# Patient Record
Sex: MALE | Race: WHITE | NOT HISPANIC OR LATINO | Employment: FULL TIME | URBAN - NONMETROPOLITAN AREA
[De-identification: names, ages, dates, MRNs, and addresses within clinical notes are randomized per-mention and may not be internally consistent; named-entity substitution may affect disease eponyms.]

---

## 2018-08-30 PROBLEM — I38 ENDOCARDITIS: Status: ACTIVE | Noted: 2018-08-30

## 2018-08-30 PROBLEM — R50.9 FEVER: Status: ACTIVE | Noted: 2018-08-30

## 2018-08-30 PROBLEM — Z95.2 MECHANICAL HEART VALVE PRESENT: Status: ACTIVE | Noted: 2018-08-30

## 2018-08-31 PROBLEM — A41.9 SEVERE SEPSIS: Status: ACTIVE | Noted: 2018-08-30

## 2018-08-31 PROBLEM — R78.81 BACTEREMIA: Status: ACTIVE | Noted: 2018-08-30

## 2018-08-31 PROBLEM — R65.20 SEVERE SEPSIS: Status: ACTIVE | Noted: 2018-08-30

## 2018-09-02 PROBLEM — B95.1 BACTEREMIA DUE TO GROUP B STREPTOCOCCUS: Status: ACTIVE | Noted: 2018-08-30

## 2018-09-07 PROBLEM — Z78.9 KNOWLEDGE DEFICIT ABOUT THERAPEUTIC DIET: Status: ACTIVE | Noted: 2018-09-07

## 2018-09-07 PROBLEM — Z55.8 KNOWLEDGE DEFICIT ABOUT THERAPEUTIC DIET: Status: ACTIVE | Noted: 2018-09-07

## 2023-11-12 ENCOUNTER — HOSPITAL ENCOUNTER (INPATIENT)
Facility: HOSPITAL | Age: 63
LOS: 4 days | Discharge: ANOTHER HOSPITAL | DRG: 871 | End: 2023-11-16
Attending: EMERGENCY MEDICINE | Admitting: STUDENT IN AN ORGANIZED HEALTH CARE EDUCATION/TRAINING PROGRAM
Payer: COMMERCIAL

## 2023-11-12 DIAGNOSIS — R50.9 FEVER: ICD-10-CM

## 2023-11-12 DIAGNOSIS — R78.81 BACTEREMIA DUE TO GRAM-POSITIVE BACTERIA: ICD-10-CM

## 2023-11-12 DIAGNOSIS — R78.81 BACTEREMIA: ICD-10-CM

## 2023-11-12 DIAGNOSIS — R07.9 CHEST PAIN AT REST: ICD-10-CM

## 2023-11-12 DIAGNOSIS — A41.9 SEVERE SEPSIS: Primary | ICD-10-CM

## 2023-11-12 DIAGNOSIS — B95.1 BACTEREMIA DUE TO GROUP B STREPTOCOCCUS: ICD-10-CM

## 2023-11-12 DIAGNOSIS — R65.20 SEVERE SEPSIS: Primary | ICD-10-CM

## 2023-11-12 DIAGNOSIS — R78.81 BACTEREMIA DUE TO GROUP B STREPTOCOCCUS: ICD-10-CM

## 2023-11-12 LAB
ACINETOBACTER CALCOACETICUS/BAUMANNII COMPLEX: NOT DETECTED
ALBUMIN SERPL BCP-MCNC: 3.4 G/DL (ref 3.5–5.2)
ALP SERPL-CCNC: 51 U/L (ref 55–135)
ALT SERPL W/O P-5'-P-CCNC: 47 U/L (ref 10–44)
ANION GAP SERPL CALC-SCNC: 10 MMOL/L (ref 3–11)
APTT PPP: 35.6 SEC (ref 21–32)
AST SERPL-CCNC: 93 U/L (ref 10–40)
BACTERIA #/AREA URNS HPF: NEGATIVE /HPF
BACTEROIDES FRAGILIS: NOT DETECTED
BASOPHILS NFR BLD: 0 % (ref 0–1.9)
BILIRUB SERPL-MCNC: 3 MG/DL (ref 0.1–1)
BILIRUB UR QL STRIP: ABNORMAL
BUN SERPL-MCNC: 30 MG/DL (ref 8–23)
CALCIUM SERPL-MCNC: 8.3 MG/DL (ref 8.7–10.5)
CANDIDA ALBICANS: NOT DETECTED
CANDIDA AURIS: NOT DETECTED
CANDIDA GLABRATA: NOT DETECTED
CANDIDA KRUSEI: NOT DETECTED
CANDIDA PARAPSILOSIS: NOT DETECTED
CANDIDA TROPICALIS: NOT DETECTED
CHLORIDE SERPL-SCNC: 110 MMOL/L (ref 95–110)
CLARITY UR: ABNORMAL
CO2 SERPL-SCNC: 19 MMOL/L (ref 23–29)
COLOR UR: ABNORMAL
CREAT SERPL-MCNC: 2 MG/DL (ref 0.5–1.4)
CRYPTOCOCCUS NEOFORMANS/GATTII: NOT DETECTED
CTP QC/QA: YES
CTX-M GENE: ABNORMAL
DIFFERENTIAL METHOD: ABNORMAL
DOHLE BOD BLD QL SMEAR: PRESENT
ENTEROBACTER CLOACAE COMPLEX: NOT DETECTED
ENTEROBACTERALES: NOT DETECTED
ENTEROCOCCUS FAECALIS: NOT DETECTED
ENTEROCOCCUS FAECIUM: NOT DETECTED
EOSINOPHIL NFR BLD: 0 % (ref 0–8)
ERYTHROCYTE [DISTWIDTH] IN BLOOD BY AUTOMATED COUNT: 14.1 % (ref 11.5–14.5)
ESCHERICHIA COLI: NOT DETECTED
EST. GFR  (NO RACE VARIABLE): 37.3 ML/MIN/1.73 M^2
FIO2: 28 %
GLUCOSE SERPL-MCNC: 179 MG/DL (ref 70–110)
GLUCOSE UR QL STRIP: NEGATIVE
GRAN CASTS #/AREA URNS LPF: 1 /LPF
HAEMOPHILUS INFLUENZAE: NOT DETECTED
HCT VFR BLD AUTO: 40.1 % (ref 40–54)
HGB BLD-MCNC: 14.4 G/DL (ref 14–18)
HGB UR QL STRIP: ABNORMAL
HYALINE CASTS #/AREA URNS LPF: 0 /LPF
IMM GRANULOCYTES # BLD AUTO: ABNORMAL K/UL (ref 0–0.04)
IMM GRANULOCYTES NFR BLD AUTO: ABNORMAL % (ref 0–0.5)
IMP GENE: ABNORMAL
INFLUENZA A, MOLECULAR: NEGATIVE
INFLUENZA B, MOLECULAR: NEGATIVE
INR PPP: 3 (ref 0.8–1.2)
KETONES UR QL STRIP: NEGATIVE
KLEBSIELLA AEROGENES: NOT DETECTED
KLEBSIELLA OXYTOCA: NOT DETECTED
KLEBSIELLA PNEUMONIAE GROUP: NOT DETECTED
KPC: ABNORMAL
LACTATE SERPL-SCNC: 5.5 MMOL/L (ref 0.5–2.2)
LACTATE SERPL-SCNC: 6.7 MMOL/L (ref 0.5–2.2)
LACTATE SERPL-SCNC: 8.3 MMOL/L (ref 0.5–2.2)
LEUKOCYTE ESTERASE UR QL STRIP: ABNORMAL
LISTERIA MONOCYTOGENES: NOT DETECTED
LPM: 2
LYMPHOCYTES NFR BLD: 3 % (ref 18–48)
MCH RBC QN AUTO: 34.5 PG (ref 27–31)
MCHC RBC AUTO-ENTMCNC: 35.9 G/DL (ref 32–36)
MCR-1: ABNORMAL
MCV RBC AUTO: 96 FL (ref 82–98)
MEC A/C AND MREJ (MRSA): ABNORMAL
MEC A/C: ABNORMAL
METAMYELOCYTES NFR BLD MANUAL: 2 %
MICROSCOPIC COMMENT: ABNORMAL
MODIFIED ALLEN'S TEST: ABNORMAL
MONOCYTES NFR BLD: 1 % (ref 4–15)
NDM GENE: ABNORMAL
NEISSERIA MENINGITIDIS: NOT DETECTED
NEUTROPHILS NFR BLD: 88 % (ref 38–73)
NEUTS BAND NFR BLD MANUAL: 6 %
NITRITE UR QL STRIP: POSITIVE
NRBC BLD-RTO: 0 /100 WBC
NT-PROBNP SERPL-MCNC: 4573 PG/ML (ref 5–900)
O2DEVICE: ABNORMAL
OXA-48-LIKE: ABNORMAL
PCO2 BLDA: 29.7 MMHG (ref 35–45)
PH SMN: 7.33 [PH] (ref 7.34–7.45)
PH UR STRIP: 6 [PH] (ref 5–8)
PLATELET # BLD AUTO: 119 K/UL (ref 150–450)
PMV BLD AUTO: 9.9 FL (ref 9.2–12.9)
PO2 BLDA: 61.6 MMHG (ref 80–100)
POC BASE DEFICIT: -10.4 MMOL/L (ref -2–2)
POC HCO3: 17.2 MMOL/L (ref 24–28)
POC PERFORMED BY: ABNORMAL
POC SATURATED O2: 93.7 % (ref 95–100)
POC TEMPERATURE: 37 C
POLYCHROMASIA BLD QL SMEAR: ABNORMAL
POTASSIUM SERPL-SCNC: 3.9 MMOL/L (ref 3.5–5.1)
PROCALCITONIN SERPL IA-MCNC: 32.56 NG/ML
PROT SERPL-MCNC: 7.3 G/DL (ref 6–8.4)
PROT UR QL STRIP: ABNORMAL
PROTEUS SPECIES: NOT DETECTED
PROTHROMBIN TIME: 27.1 SEC (ref 9–12.5)
PSEUDOMONAS AERUGINOSA: NOT DETECTED
RBC # BLD AUTO: 4.17 M/UL (ref 4.6–6.2)
RBC #/AREA URNS HPF: 4 /HPF (ref 0–4)
RSV AG SPEC QL IA: NEGATIVE
SALMONELLA SP: NOT DETECTED
SARS-COV-2 RDRP RESP QL NAA+PROBE: NEGATIVE
SERRATIA MARCESCENS: NOT DETECTED
SODIUM SERPL-SCNC: 139 MMOL/L (ref 136–145)
SP GR UR STRIP: >=1.03 (ref 1–1.03)
SPECIMEN SOURCE: ABNORMAL
SPECIMEN SOURCE: NORMAL
SPECIMEN SOURCE: NORMAL
SQUAMOUS #/AREA URNS HPF: 4 /HPF
STAPHYLOCOCCUS AUREUS: NOT DETECTED
STAPHYLOCOCCUS EPIDERMIDIS: NOT DETECTED
STAPHYLOCOCCUS LUGDUNESIS: NOT DETECTED
STAPHYLOCOCCUS SPECIES: NOT DETECTED
STENOTROPHOMONAS MALTOPHILIA: NOT DETECTED
STREPTOCOCCUS AGALACTIAE: NOT DETECTED
STREPTOCOCCUS PNEUMONIAE: NOT DETECTED
STREPTOCOCCUS PYOGENES: NOT DETECTED
STREPTOCOCCUS SPECIES: DETECTED
URN SPEC COLLECT METH UR: ABNORMAL
UROBILINOGEN UR STRIP-ACNC: 1 EU/DL
VAN A/B: ABNORMAL
VIM GENE: ABNORMAL
WBC # BLD AUTO: 14.37 K/UL (ref 3.9–12.7)
WBC #/AREA URNS HPF: 10 /HPF (ref 0–5)
WBC TOXIC VACUOLES BLD QL SMEAR: PRESENT

## 2023-11-12 PROCEDURE — 87154 CUL TYP ID BLD PTHGN 6+ TRGT: CPT | Performed by: EMERGENCY MEDICINE

## 2023-11-12 PROCEDURE — 63600175 PHARM REV CODE 636 W HCPCS: Performed by: EMERGENCY MEDICINE

## 2023-11-12 PROCEDURE — 81000 URINALYSIS NONAUTO W/SCOPE: CPT | Performed by: EMERGENCY MEDICINE

## 2023-11-12 PROCEDURE — 36415 COLL VENOUS BLD VENIPUNCTURE: CPT | Performed by: INTERNAL MEDICINE

## 2023-11-12 PROCEDURE — 83605 ASSAY OF LACTIC ACID: CPT | Mod: 91 | Performed by: EMERGENCY MEDICINE

## 2023-11-12 PROCEDURE — 25000003 PHARM REV CODE 250: Performed by: EMERGENCY MEDICINE

## 2023-11-12 PROCEDURE — 85730 THROMBOPLASTIN TIME PARTIAL: CPT | Performed by: EMERGENCY MEDICINE

## 2023-11-12 PROCEDURE — 93010 ELECTROCARDIOGRAM REPORT: CPT | Mod: ,,, | Performed by: INTERNAL MEDICINE

## 2023-11-12 PROCEDURE — 87147 CULTURE TYPE IMMUNOLOGIC: CPT | Performed by: EMERGENCY MEDICINE

## 2023-11-12 PROCEDURE — 93010 EKG 12-LEAD: ICD-10-PCS | Mod: ,,, | Performed by: INTERNAL MEDICINE

## 2023-11-12 PROCEDURE — 85610 PROTHROMBIN TIME: CPT | Performed by: EMERGENCY MEDICINE

## 2023-11-12 PROCEDURE — 27000221 HC OXYGEN, UP TO 24 HOURS

## 2023-11-12 PROCEDURE — 36600 WITHDRAWAL OF ARTERIAL BLOOD: CPT

## 2023-11-12 PROCEDURE — 63600175 PHARM REV CODE 636 W HCPCS: Performed by: INTERNAL MEDICINE

## 2023-11-12 PROCEDURE — 84145 PROCALCITONIN (PCT): CPT | Performed by: EMERGENCY MEDICINE

## 2023-11-12 PROCEDURE — 99900035 HC TECH TIME PER 15 MIN (STAT)

## 2023-11-12 PROCEDURE — 87634 RSV DNA/RNA AMP PROBE: CPT | Performed by: EMERGENCY MEDICINE

## 2023-11-12 PROCEDURE — 83880 ASSAY OF NATRIURETIC PEPTIDE: CPT | Performed by: EMERGENCY MEDICINE

## 2023-11-12 PROCEDURE — 93005 ELECTROCARDIOGRAM TRACING: CPT

## 2023-11-12 PROCEDURE — 87186 SC STD MICRODIL/AGAR DIL: CPT | Performed by: EMERGENCY MEDICINE

## 2023-11-12 PROCEDURE — 99900031 HC PATIENT EDUCATION (STAT)

## 2023-11-12 PROCEDURE — 94660 CPAP INITIATION&MGMT: CPT

## 2023-11-12 PROCEDURE — 99285 EMERGENCY DEPT VISIT HI MDM: CPT | Mod: 25

## 2023-11-12 PROCEDURE — 85027 COMPLETE CBC AUTOMATED: CPT | Performed by: EMERGENCY MEDICINE

## 2023-11-12 PROCEDURE — 87502 INFLUENZA DNA AMP PROBE: CPT | Performed by: EMERGENCY MEDICINE

## 2023-11-12 PROCEDURE — 96361 HYDRATE IV INFUSION ADD-ON: CPT

## 2023-11-12 PROCEDURE — 82803 BLOOD GASES ANY COMBINATION: CPT

## 2023-11-12 PROCEDURE — 27000190 HC CPAP FULL FACE MASK W/VALVE

## 2023-11-12 PROCEDURE — 85007 BL SMEAR W/DIFF WBC COUNT: CPT | Performed by: EMERGENCY MEDICINE

## 2023-11-12 PROCEDURE — 94761 N-INVAS EAR/PLS OXIMETRY MLT: CPT

## 2023-11-12 PROCEDURE — 87635 SARS-COV-2 COVID-19 AMP PRB: CPT | Performed by: EMERGENCY MEDICINE

## 2023-11-12 PROCEDURE — 96365 THER/PROPH/DIAG IV INF INIT: CPT

## 2023-11-12 PROCEDURE — 25000003 PHARM REV CODE 250: Performed by: INTERNAL MEDICINE

## 2023-11-12 PROCEDURE — 80053 COMPREHEN METABOLIC PANEL: CPT | Performed by: EMERGENCY MEDICINE

## 2023-11-12 PROCEDURE — 87040 BLOOD CULTURE FOR BACTERIA: CPT | Performed by: EMERGENCY MEDICINE

## 2023-11-12 PROCEDURE — 21400001 HC TELEMETRY ROOM

## 2023-11-12 PROCEDURE — 36415 COLL VENOUS BLD VENIPUNCTURE: CPT | Performed by: EMERGENCY MEDICINE

## 2023-11-12 PROCEDURE — 83605 ASSAY OF LACTIC ACID: CPT | Mod: 91 | Performed by: INTERNAL MEDICINE

## 2023-11-12 RX ORDER — SODIUM CHLORIDE 0.9 % (FLUSH) 0.9 %
10 SYRINGE (ML) INJECTION
Status: DISCONTINUED | OUTPATIENT
Start: 2023-11-12 | End: 2023-11-16 | Stop reason: HOSPADM

## 2023-11-12 RX ORDER — ALPRAZOLAM 0.25 MG/1
0.25 TABLET ORAL 2 TIMES DAILY PRN
Status: DISCONTINUED | OUTPATIENT
Start: 2023-11-12 | End: 2023-11-14

## 2023-11-12 RX ORDER — WARFARIN 1 MG/1
4 TABLET ORAL
Status: DISCONTINUED | OUTPATIENT
Start: 2023-11-14 | End: 2023-11-12

## 2023-11-12 RX ORDER — MORPHINE SULFATE 2 MG/ML
1 INJECTION, SOLUTION INTRAMUSCULAR; INTRAVENOUS EVERY 4 HOURS PRN
Status: DISCONTINUED | OUTPATIENT
Start: 2023-11-12 | End: 2023-11-16 | Stop reason: HOSPADM

## 2023-11-12 RX ORDER — TADALAFIL 5 MG/1
5 TABLET ORAL NIGHTLY
COMMUNITY
Start: 2023-10-23 | End: 2023-11-16

## 2023-11-12 RX ORDER — VANCOMYCIN 2 GRAM/500 ML IN 0.9 % SODIUM CHLORIDE INTRAVENOUS
2000 ONCE
Status: COMPLETED | OUTPATIENT
Start: 2023-11-12 | End: 2023-11-12

## 2023-11-12 RX ORDER — FAMOTIDINE 10 MG/ML
20 INJECTION INTRAVENOUS EVERY 12 HOURS
Status: DISCONTINUED | OUTPATIENT
Start: 2023-11-12 | End: 2023-11-15

## 2023-11-12 RX ORDER — ACETAMINOPHEN 500 MG
1000 TABLET ORAL
Status: COMPLETED | OUTPATIENT
Start: 2023-11-12 | End: 2023-11-12

## 2023-11-12 RX ORDER — ASPIRIN 81 MG/1
81 TABLET ORAL DAILY
Status: DISCONTINUED | OUTPATIENT
Start: 2023-11-12 | End: 2023-11-12

## 2023-11-12 RX ORDER — ONDANSETRON 2 MG/ML
4 INJECTION INTRAMUSCULAR; INTRAVENOUS EVERY 8 HOURS PRN
Status: DISCONTINUED | OUTPATIENT
Start: 2023-11-12 | End: 2023-11-16 | Stop reason: HOSPADM

## 2023-11-12 RX ORDER — ASPIRIN 81 MG/1
81 TABLET ORAL DAILY
Status: DISCONTINUED | OUTPATIENT
Start: 2023-11-13 | End: 2023-11-16 | Stop reason: HOSPADM

## 2023-11-12 RX ORDER — IBUPROFEN 800 MG/1
800 TABLET ORAL
Status: COMPLETED | OUTPATIENT
Start: 2023-11-12 | End: 2023-11-12

## 2023-11-12 RX ORDER — ACETAMINOPHEN 325 MG/1
650 TABLET ORAL EVERY 8 HOURS PRN
Status: DISCONTINUED | OUTPATIENT
Start: 2023-11-12 | End: 2023-11-15

## 2023-11-12 RX ORDER — SODIUM CHLORIDE 9 MG/ML
1000 INJECTION, SOLUTION INTRAVENOUS CONTINUOUS
Status: ACTIVE | OUTPATIENT
Start: 2023-11-12 | End: 2023-11-12

## 2023-11-12 RX ADMIN — MORPHINE SULFATE 1 MG: 2 INJECTION, SOLUTION INTRAMUSCULAR; INTRAVENOUS at 05:11

## 2023-11-12 RX ADMIN — SODIUM CHLORIDE 1000 ML: 9 INJECTION, SOLUTION INTRAVENOUS at 11:11

## 2023-11-12 RX ADMIN — PIPERACILLIN SODIUM AND TAZOBACTAM SODIUM 4.5 G: 4; .5 INJECTION, POWDER, FOR SOLUTION INTRAVENOUS at 08:11

## 2023-11-12 RX ADMIN — SODIUM CHLORIDE 250 ML: 9 INJECTION, SOLUTION INTRAVENOUS at 08:11

## 2023-11-12 RX ADMIN — ALPRAZOLAM 0.25 MG: 0.25 TABLET ORAL at 02:11

## 2023-11-12 RX ADMIN — SODIUM CHLORIDE 250 ML: 9 INJECTION, SOLUTION INTRAVENOUS at 10:11

## 2023-11-12 RX ADMIN — Medication 2000 MG: at 10:11

## 2023-11-12 RX ADMIN — FAMOTIDINE 20 MG: 10 INJECTION, SOLUTION INTRAVENOUS at 08:11

## 2023-11-12 RX ADMIN — ACETAMINOPHEN 1000 MG: 500 TABLET ORAL at 08:11

## 2023-11-12 RX ADMIN — IBUPROFEN 800 MG: 800 TABLET, FILM COATED ORAL at 08:11

## 2023-11-12 RX ADMIN — SODIUM CHLORIDE 2466 ML: 9 INJECTION, SOLUTION INTRAVENOUS at 08:11

## 2023-11-12 RX ADMIN — PIPERACILLIN SODIUM AND TAZOBACTAM SODIUM 4.5 G: 4; .5 INJECTION, POWDER, FOR SOLUTION INTRAVENOUS at 04:11

## 2023-11-12 NOTE — PROGRESS NOTES
"Pharmacokinetic Initial Assessment: IV Vancomycin    Assessment/Plan:    Initiate intravenous vancomycin with loading dose of 2000 mg once with subsequent doses when random concentrations are less than 20 mcg/mL  Desired empiric serum trough concentration is 15 to 20 mcg/mL  Draw vancomycin random level on 11/13 at 0430 with AM labs.  Pharmacy will continue to follow and monitor vancomycin.      Please contact pharmacy at extension [phone] with any questions regarding this assessment.     Thank you for the consult,   Haylee Avila       Patient brief summary:  Chapo Rosario is a 63 y.o. male initiated on antimicrobial therapy with IV Vancomycin for treatment of suspected sepsis    Drug Allergies:   Review of patient's allergies indicates:  No Known Allergies    Actual Body Weight:   137.6 kg    Renal Function:   Estimated Creatinine Clearance: 57.3 mL/min (A) (based on SCr of 2 mg/dL (H)).,     Dialysis Method (if applicable):  N/A.    CBC (last 72 hours):  Recent Labs   Lab Result Units 11/12/23  0837   WBC K/uL 14.37*   Hemoglobin g/dL 14.4   Hematocrit % 40.1   Platelets K/uL 119*   Gran % % 88.0*   Lymph % % 3.0*   Mono % % 1.0*   Eosinophil % % 0.0   Basophil % % 0.0   Differential Method  Manual       Metabolic Panel (last 72 hours):  Recent Labs   Lab Result Units 11/12/23  0837 11/12/23  0848   Sodium mmol/L 139  --    Potassium mmol/L 3.9  --    Chloride mmol/L 110  --    CO2 mmol/L 19*  --    Glucose mg/dL 179*  --    Glucose, UA   --  Negative   BUN mg/dL 30*  --    Creatinine mg/dL 2.0*  --    Albumin g/dL 3.4*  --    Total Bilirubin mg/dL 3.0*  --    Alkaline Phosphatase U/L 51*  --    AST U/L 93*  --    ALT U/L 47*  --        Drug levels (last 3 results):  No results for input(s): "VANCOMYCINRA", "VANCORANDOM", "VANCOMYCINPE", "VANCOPEAK", "VANCOMYCINTR", "VANCOTROUGH" in the last 72 hours.    Microbiologic Results:  Microbiology Results (last 7 days)       Procedure Component Value Units " Date/Time    Influenza A & B by Molecular [[id number]] Collected: 11/12/23 0834    Order Status: Completed Specimen: Nasopharyngeal Swab Updated: 11/12/23 0929     Influenza A, Molecular Negative     Influenza B, Molecular Negative     Flu A & B Source Nasal Swab    Blood culture x two cultures. Draw prior to antibiotics. [[id number]] Collected: 11/12/23 0836    Order Status: Sent Specimen: Blood Updated: 11/12/23 0838    Blood culture x two cultures. Draw prior to antibiotics. [[id number]] Collected: 11/12/23 0837    Order Status: Sent Specimen: Blood Updated: 11/12/23 0838

## 2023-11-12 NOTE — ED PROVIDER NOTES
Encounter Date: 11/12/2023       History     Chief Complaint   Patient presents with    Altered Mental Status     AMS status onset this am and fever since 3 pm yesterday. Tylenol given approximately 1 hr PTA. Recent cellulitis.     63-year-old male with a history of hypertension, aortic aneurysm, mechanical heart valve on Coumadin presents the emergency department with fever since yesterday with altered mental status, given Tylenol this morning, unknown mg.  He presents tachypneic, oxygen sat 85% on room air, oral temperature of 102.5° F, tachycardic.  No known exposure to COVID.  He does answer some questions, but appears somewhat lethargic.  Denies chest pain.  Family states he is having some nausea vomiting and diarrhea as well.  History gathered from family members      Review of patient's allergies indicates:  No Known Allergies  Past Medical History:   Diagnosis Date    Aortic aneurysm     Hypertension      Past Surgical History:   Procedure Laterality Date    APPENDECTOMY      CARDIAC SURGERY  2013    CHOLECYSTECTOMY      NECK SURGERY       No family history on file.  Social History     Tobacco Use    Smoking status: Former     Review of Systems   Constitutional:  Positive for chills, fatigue and fever.   HENT: Negative.     Eyes: Negative.    Respiratory:  Positive for shortness of breath.    Cardiovascular: Negative.    Gastrointestinal: Negative.    Genitourinary: Negative.    Neurological:  Positive for weakness.   All other systems reviewed and are negative.      Physical Exam     Initial Vitals [11/12/23 0820]   BP Pulse Resp Temp SpO2   (!) 127/57 (!) 113 (!) 28 (!) 102.5 °F (39.2 °C) (!) 85 %      MAP       --         Physical Exam    Nursing note and vitals reviewed.  Constitutional: He appears well-developed and well-nourished. He is not diaphoretic. He appears distressed.   HENT:   Head: Normocephalic and atraumatic.   Mouth/Throat: Oropharynx is clear and moist.   Eyes: EOM are normal. Pupils are  equal, round, and reactive to light.   Neck: Neck supple.   Normal range of motion.  Cardiovascular:  Regular rhythm.           No murmur heard.  Tachycardic, mechanical valve audible   Pulmonary/Chest: He has rales.   Abdominal: Abdomen is soft. Bowel sounds are normal. He exhibits no distension. There is no abdominal tenderness. There is no rebound.   Musculoskeletal:         General: No edema. Normal range of motion.      Cervical back: Normal range of motion and neck supple.     Neurological: He is alert.   GCS 14   Skin: Skin is warm and dry.         ED Course   Critical Care    Date/Time: 11/12/2023 9:53 AM    Performed by: Dwight Modi MD  Authorized by: Dwight Modi MD  Direct patient critical care time: 15 minutes  Additional history critical care time: 10 minutes  Ordering / reviewing critical care time: 10 minutes  Documentation critical care time: 15 minutes  Consulting other physicians critical care time: 10 minutes  Consult with family critical care time: 10 minutes  Total critical care time (exclusive of procedural time) : 70 minutes  Critical care was necessary to treat or prevent imminent or life-threatening deterioration of the following conditions: sepsis.  Critical care was time spent personally by me on the following activities: review of old charts, re-evaluation of patient's condition, pulse oximetry, ordering and review of radiographic studies, ordering and review of laboratory studies, ordering and performing treatments and interventions, obtaining history from patient or surrogate, examination of patient, evaluation of patient's response to treatment, discussions with primary provider and development of treatment plan with patient or surrogate.        Labs Reviewed   CBC W/ AUTO DIFFERENTIAL - Abnormal; Notable for the following components:       Result Value    WBC 14.37 (*)     RBC 4.17 (*)     MCH 34.5 (*)     Platelets 119 (*)     Gran % 88.0 (*)     Lymph % 3.0 (*)     Mono  % 1.0 (*)     All other components within normal limits   COMPREHENSIVE METABOLIC PANEL - Abnormal; Notable for the following components:    CO2 19 (*)     Glucose 179 (*)     BUN 30 (*)     Creatinine 2.0 (*)     Calcium 8.3 (*)     Albumin 3.4 (*)     Total Bilirubin 3.0 (*)     Alkaline Phosphatase 51 (*)     AST 93 (*)     ALT 47 (*)     eGFR 37.3 (*)     All other components within normal limits   LACTIC ACID, PLASMA - Abnormal; Notable for the following components:    Lactate (Lactic Acid) 8.3 (*)     All other components within normal limits    Narrative:     Lactic Acid critical result(s) called and verbal readback obtained   from Luz Maria Cuellar RN by [facility] 11/12/2023 09:17   URINALYSIS, REFLEX TO URINE CULTURE - Abnormal; Notable for the following components:    Color, UA Orange (*)     Appearance, UA Cloudy (*)     Specific Gravity, UA >=1.030 (*)     Protein, UA 1+ (*)     Bilirubin (UA) 1+ (*)     Occult Blood UA Trace (*)     Nitrite, UA Positive (*)     Leukocytes, UA Trace (*)     All other components within normal limits    Narrative:     Preferred Collection Type->Urine, Clean Catch  Specimen Source->Urine   APTT - Abnormal; Notable for the following components:    aPTT 35.6 (*)     All other components within normal limits   PROTIME-INR - Abnormal; Notable for the following components:    Prothrombin Time 27.1 (*)     INR 3.0 (*)     All other components within normal limits   NT-PRO NATRIURETIC PEPTIDE - Abnormal; Notable for the following components:    NT-proBNP 4573 (*)     All other components within normal limits   URINALYSIS MICROSCOPIC - Abnormal; Notable for the following components:    WBC, UA 10 (*)     Granular Casts, UA 1 (*)     All other components within normal limits    Narrative:     Preferred Collection Type->Urine, Clean Catch  Specimen Source->Urine   INFLUENZA A & B BY MOLECULAR   CULTURE, BLOOD   CULTURE, BLOOD   RSV ANTIGEN DETECTION    Narrative:     Specimen  Source->Nasopharyngeal Swab   PROCALCITONIN   SARS-COV-2 RDRP GENE    Narrative:     This test utilizes isothermal nucleic acid amplification technology to detect the SARS-CoV-2 RdRp nucleic acid segment. The analytical sensitivity (limit of detection) is 500 copies/swab.     A POSITIVE result is indicative of the presence of SARS-CoV-2 RNA; clinical correlation with patient history and other diagnostic information is necessary to determine patient infection status.    A NEGATIVE result means that SARS-CoV-2 nucleic acids are not present above the limit of detection. A NEGATIVE result should be treated as presumptive. It does not rule out the possibility of COVID-19 and should not be the sole basis for treatment decisions. If COVID-19 is strongly suspected based on clinical and exposure history, re-testing using an alternate molecular assay should be considered.     This test is only for use under the Food and Drug Administration s Emergency Use Authorization (EUA).     Commercial kits are provided by [employer]. Performance characteristics of the EUA have been independently verified by [facility] Department of Pathology and Laboratory Medicine.   _________________________________________________________________   The authorized Fact Sheet for Healthcare Providers and the authorized Fact Sheet for Patients of the ID NOW COVID-19 are available on the FDA website:    https://www.fda.gov/media/416672/download      https://www.fda.gov/media/421121/download        EKG Readings: (Independently Interpreted)   Initial Reading: No STEMI. Rhythm: Sinus Tachycardia. Heart Rate: 115. Ectopy: No Ectopy. Conduction: Normal. ST Segments: Normal ST Segments. T Waves: Normal. Axis: Normal. Clinical Impression: Sinus Tachycardia       Imaging Results              X-Ray Chest AP Portable (In process)                      Medications   vancomycin - pharmacy to dose (has no administration in time range)   vancomycin  "2 g in 0.9% sodium chloride 500 mL IVPB (has no administration in time range)   sodium chloride 0.9% bolus 2,466 mL 2,466 mL (2,466 mLs Intravenous New Bag 11/12/23 0834)   ibuprofen tablet 800 mg (800 mg Oral Given 11/12/23 0833)   acetaminophen tablet 1,000 mg (1,000 mg Oral Given 11/12/23 0833)   piperacillin-tazobactam (ZOSYN) 4.5 g in dextrose 5 % in water (D5W) 100 mL IVPB (MB+) (0 g Intravenous Stopped 11/12/23 0910)     Medical Decision Making  Amount and/or Complexity of Data Reviewed  Labs: ordered. Decision-making details documented in ED Course.  Radiology: ordered.    Risk  OTC drugs.  Prescription drug management.               ED Course as of 11/12/23 0959   Sun Nov 12, 2023   0909 Anion Gap: 10 [SD]   0944 Discussed ICU versus med surge for this patient with the nursing supervisor.  She has laid eyes on this patient, states patient can go to telemetry floor close with the nurse's station, if he declines she will transfer him to the ICU [SD]   0953 Patient's mental status has improved since his fever has come down.  Answering questions more appropriately. [SD]      ED Course User Index  [SD] Dwight Modi MD    Sepsis Perfusion Assessment: "I attest a sepsis perfusion exam was performed within 6 hours of sepsis, severe sepsis, or septic shock presentation, following fluid resuscitation."          Medical Decision Making:   Initial Assessment:   Sepsis protocol initiated, will use ideal body weight for fluid bolus due to patient being morbidly obese, as well as having heart issues, mechanical heart valve, do not want to fluid overload him  Differential Diagnosis:   Sepsis, viral syndrome, pneumonia  Clinical Tests:   Sepsis Perfusion Assessment: ""I attest a sepsis perfusion exam was performed within 6 hours of sepsis, severe sepsis, or septic shock presentation, following fluid resuscitation.""  ED Management:  This patient does have evidence of infective focus  My overall impression is " sepsis.  Source: Respiratory  Antibiotics given- Antibiotics (72h ago, onward)    None      Latest lactate reviewed-  8.3  Organ dysfunction indicated by Acute kidney injury    Fluid challenge Ideal Body Weight- The patient's ideal body weight  will be used to calculate fluid bolus of 30 ml/kg for treatment of septic shock.    Patient is morbidly obese, also history of cardiac issues as well as mechanical heart valve, I do not want to fluid overload him  Post- resuscitation assessment Yes Perfusion exam was performed within 6 hours of septic shock presentation after bolus shows Adequate tissue perfusion assessed by non-invasive monitoring       Will Not start Pressors- Levophed for MAP of 65        Clinical Impression:   Final diagnoses:  [R50.9] Fever  [A41.9, R65.20] Severe sepsis (Primary)        ED Disposition Condition    Admit Stable                Dwight Modi MD  11/12/23 6800

## 2023-11-12 NOTE — ED NOTES
Sepsis post fluid bolus initiation tissue perfusion exam:  See current vital signs.  Cardiopulmonary:   Heart: Tachycardia.   Lungs: Diminished to auscultation bilaterally.  Capillary refill: < 3 seconds.  Peripheral pulses: radial 2+ bilaterally.  Skin: warm/dry/pink with good turgor.

## 2023-11-13 PROBLEM — D69.6 THROMBOCYTOPENIA: Status: ACTIVE | Noted: 2023-11-13

## 2023-11-13 PROBLEM — G93.41 ENCEPHALOPATHY, METABOLIC: Status: ACTIVE | Noted: 2023-11-13

## 2023-11-13 PROBLEM — N17.9 AKI (ACUTE KIDNEY INJURY): Status: ACTIVE | Noted: 2023-11-13

## 2023-11-13 PROBLEM — R79.89 ELEVATED BRAIN NATRIURETIC PEPTIDE (BNP) LEVEL: Status: ACTIVE | Noted: 2023-11-13

## 2023-11-13 PROBLEM — R50.9 FEVER: Status: ACTIVE | Noted: 2023-11-13

## 2023-11-13 PROBLEM — R78.81 BACTEREMIA: Status: ACTIVE | Noted: 2023-11-13

## 2023-11-13 LAB
ALBUMIN SERPL BCP-MCNC: 3.3 G/DL (ref 3.5–5.2)
ALP SERPL-CCNC: 34 U/L (ref 55–135)
ALT SERPL W/O P-5'-P-CCNC: 72 U/L (ref 10–44)
ANION GAP SERPL CALC-SCNC: 12 MMOL/L (ref 3–11)
AST SERPL-CCNC: 136 U/L (ref 10–40)
BASOPHILS NFR BLD: 0 % (ref 0–1.9)
BILIRUB SERPL-MCNC: 4.3 MG/DL (ref 0.1–1)
BUN SERPL-MCNC: 48 MG/DL (ref 8–23)
CALCIUM SERPL-MCNC: 7.9 MG/DL (ref 8.7–10.5)
CHLORIDE SERPL-SCNC: 113 MMOL/L (ref 95–110)
CO2 SERPL-SCNC: 17 MMOL/L (ref 23–29)
CREAT SERPL-MCNC: 2.1 MG/DL (ref 0.5–1.4)
DIFFERENTIAL METHOD: ABNORMAL
DOHLE BOD BLD QL SMEAR: PRESENT
EOSINOPHIL NFR BLD: 0 % (ref 0–8)
ERYTHROCYTE [DISTWIDTH] IN BLOOD BY AUTOMATED COUNT: 15 % (ref 11.5–14.5)
EST. GFR  (NO RACE VARIABLE): 35.2 ML/MIN/1.73 M^2
GLUCOSE SERPL-MCNC: 155 MG/DL (ref 70–110)
HCT VFR BLD AUTO: 39.2 % (ref 40–54)
HGB BLD-MCNC: 13.6 G/DL (ref 14–18)
IMM GRANULOCYTES # BLD AUTO: ABNORMAL K/UL (ref 0–0.04)
IMM GRANULOCYTES NFR BLD AUTO: ABNORMAL % (ref 0–0.5)
INR PPP: 2.8 (ref 0.8–1.2)
LACTATE SERPL-SCNC: 3.7 MMOL/L (ref 0.5–2.2)
LACTATE SERPL-SCNC: 4.6 MMOL/L (ref 0.5–2.2)
LACTATE SERPL-SCNC: 6 MMOL/L (ref 0.5–2.2)
LACTATE SERPL-SCNC: 6.9 MMOL/L (ref 0.5–2.2)
LYMPHOCYTES NFR BLD: 16 % (ref 18–48)
MCH RBC QN AUTO: 34.4 PG (ref 27–31)
MCHC RBC AUTO-ENTMCNC: 34.7 G/DL (ref 32–36)
MCV RBC AUTO: 99 FL (ref 82–98)
MONOCYTES NFR BLD: 7 % (ref 4–15)
NEUTROPHILS NFR BLD: 72 % (ref 38–73)
NEUTS BAND NFR BLD MANUAL: 5 %
NRBC BLD-RTO: 0 /100 WBC
PLATELET # BLD AUTO: 88 K/UL (ref 150–450)
PMV BLD AUTO: 11.3 FL (ref 9.2–12.9)
POLYCHROMASIA BLD QL SMEAR: ABNORMAL
POTASSIUM SERPL-SCNC: 4.2 MMOL/L (ref 3.5–5.1)
PROT SERPL-MCNC: 7.3 G/DL (ref 6–8.4)
PROTHROMBIN TIME: 25.4 SEC (ref 9–12.5)
RBC # BLD AUTO: 3.95 M/UL (ref 4.6–6.2)
SODIUM SERPL-SCNC: 142 MMOL/L (ref 136–145)
VANCOMYCIN SERPL-MCNC: 8.2 UG/ML
WBC # BLD AUTO: 13.21 K/UL (ref 3.9–12.7)
WBC TOXIC VACUOLES BLD QL SMEAR: PRESENT

## 2023-11-13 PROCEDURE — 21400001 HC TELEMETRY ROOM

## 2023-11-13 PROCEDURE — 80202 ASSAY OF VANCOMYCIN: CPT | Performed by: EMERGENCY MEDICINE

## 2023-11-13 PROCEDURE — 36415 COLL VENOUS BLD VENIPUNCTURE: CPT | Performed by: INTERNAL MEDICINE

## 2023-11-13 PROCEDURE — 85610 PROTHROMBIN TIME: CPT | Performed by: EMERGENCY MEDICINE

## 2023-11-13 PROCEDURE — 25000003 PHARM REV CODE 250: Performed by: STUDENT IN AN ORGANIZED HEALTH CARE EDUCATION/TRAINING PROGRAM

## 2023-11-13 PROCEDURE — 27000221 HC OXYGEN, UP TO 24 HOURS

## 2023-11-13 PROCEDURE — 36415 COLL VENOUS BLD VENIPUNCTURE: CPT | Performed by: EMERGENCY MEDICINE

## 2023-11-13 PROCEDURE — 94761 N-INVAS EAR/PLS OXIMETRY MLT: CPT

## 2023-11-13 PROCEDURE — 99900035 HC TECH TIME PER 15 MIN (STAT)

## 2023-11-13 PROCEDURE — 36415 COLL VENOUS BLD VENIPUNCTURE: CPT | Performed by: STUDENT IN AN ORGANIZED HEALTH CARE EDUCATION/TRAINING PROGRAM

## 2023-11-13 PROCEDURE — 99223 1ST HOSP IP/OBS HIGH 75: CPT | Mod: ,,, | Performed by: STUDENT IN AN ORGANIZED HEALTH CARE EDUCATION/TRAINING PROGRAM

## 2023-11-13 PROCEDURE — 94660 CPAP INITIATION&MGMT: CPT

## 2023-11-13 PROCEDURE — 85027 COMPLETE CBC AUTOMATED: CPT | Performed by: EMERGENCY MEDICINE

## 2023-11-13 PROCEDURE — 80053 COMPREHEN METABOLIC PANEL: CPT | Performed by: EMERGENCY MEDICINE

## 2023-11-13 PROCEDURE — 63600175 PHARM REV CODE 636 W HCPCS: Performed by: INTERNAL MEDICINE

## 2023-11-13 PROCEDURE — 99900031 HC PATIENT EDUCATION (STAT)

## 2023-11-13 PROCEDURE — 63600175 PHARM REV CODE 636 W HCPCS: Performed by: EMERGENCY MEDICINE

## 2023-11-13 PROCEDURE — 63600175 PHARM REV CODE 636 W HCPCS: Performed by: STUDENT IN AN ORGANIZED HEALTH CARE EDUCATION/TRAINING PROGRAM

## 2023-11-13 PROCEDURE — 83605 ASSAY OF LACTIC ACID: CPT | Performed by: INTERNAL MEDICINE

## 2023-11-13 PROCEDURE — 25000003 PHARM REV CODE 250: Performed by: EMERGENCY MEDICINE

## 2023-11-13 PROCEDURE — 94799 UNLISTED PULMONARY SVC/PX: CPT

## 2023-11-13 PROCEDURE — 25000003 PHARM REV CODE 250: Performed by: INTERNAL MEDICINE

## 2023-11-13 PROCEDURE — 85007 BL SMEAR W/DIFF WBC COUNT: CPT | Performed by: EMERGENCY MEDICINE

## 2023-11-13 PROCEDURE — 83605 ASSAY OF LACTIC ACID: CPT | Mod: 91 | Performed by: STUDENT IN AN ORGANIZED HEALTH CARE EDUCATION/TRAINING PROGRAM

## 2023-11-13 PROCEDURE — 99223 PR INITIAL HOSPITAL CARE,LEVL III: ICD-10-PCS | Mod: ,,, | Performed by: STUDENT IN AN ORGANIZED HEALTH CARE EDUCATION/TRAINING PROGRAM

## 2023-11-13 RX ORDER — VANCOMYCIN 2 GRAM/500 ML IN 0.9 % SODIUM CHLORIDE INTRAVENOUS
2000 ONCE
Status: COMPLETED | OUTPATIENT
Start: 2023-11-13 | End: 2023-11-13

## 2023-11-13 RX ORDER — FUROSEMIDE 10 MG/ML
40 INJECTION INTRAMUSCULAR; INTRAVENOUS ONCE
Status: COMPLETED | OUTPATIENT
Start: 2023-11-13 | End: 2023-11-13

## 2023-11-13 RX ORDER — METOPROLOL TARTRATE 1 MG/ML
5 INJECTION, SOLUTION INTRAVENOUS EVERY 5 MIN PRN
Status: COMPLETED | OUTPATIENT
Start: 2023-11-13 | End: 2023-11-14

## 2023-11-13 RX ORDER — SODIUM BICARBONATE 650 MG/1
650 TABLET ORAL 2 TIMES DAILY
Status: DISCONTINUED | OUTPATIENT
Start: 2023-11-13 | End: 2023-11-16

## 2023-11-13 RX ORDER — SODIUM CHLORIDE 9 MG/ML
INJECTION, SOLUTION INTRAVENOUS CONTINUOUS
Status: DISCONTINUED | OUTPATIENT
Start: 2023-11-13 | End: 2023-11-13

## 2023-11-13 RX ADMIN — ASPIRIN 81 MG: 81 TABLET, COATED ORAL at 09:11

## 2023-11-13 RX ADMIN — SODIUM BICARBONATE 650 MG TABLET 650 MG: at 08:11

## 2023-11-13 RX ADMIN — MORPHINE SULFATE 1 MG: 2 INJECTION, SOLUTION INTRAMUSCULAR; INTRAVENOUS at 01:11

## 2023-11-13 RX ADMIN — VANCOMYCIN HYDROCHLORIDE 500 MG: 500 INJECTION, POWDER, LYOPHILIZED, FOR SOLUTION INTRAVENOUS at 11:11

## 2023-11-13 RX ADMIN — FAMOTIDINE 20 MG: 10 INJECTION, SOLUTION INTRAVENOUS at 09:11

## 2023-11-13 RX ADMIN — SODIUM CHLORIDE: 9 INJECTION, SOLUTION INTRAVENOUS at 09:11

## 2023-11-13 RX ADMIN — SODIUM CHLORIDE: 9 INJECTION, SOLUTION INTRAVENOUS at 02:11

## 2023-11-13 RX ADMIN — ALPRAZOLAM 0.25 MG: 0.25 TABLET ORAL at 05:11

## 2023-11-13 RX ADMIN — MORPHINE SULFATE 1 MG: 2 INJECTION, SOLUTION INTRAMUSCULAR; INTRAVENOUS at 02:11

## 2023-11-13 RX ADMIN — ACETAMINOPHEN 650 MG: 325 TABLET ORAL at 12:11

## 2023-11-13 RX ADMIN — FAMOTIDINE 20 MG: 10 INJECTION, SOLUTION INTRAVENOUS at 08:11

## 2023-11-13 RX ADMIN — ONDANSETRON 4 MG: 2 INJECTION INTRAMUSCULAR; INTRAVENOUS at 01:11

## 2023-11-13 RX ADMIN — FUROSEMIDE 40 MG: 10 INJECTION, SOLUTION INTRAVENOUS at 05:11

## 2023-11-13 RX ADMIN — PIPERACILLIN SODIUM AND TAZOBACTAM SODIUM 4.5 G: 4; .5 INJECTION, POWDER, FOR SOLUTION INTRAVENOUS at 12:11

## 2023-11-13 RX ADMIN — PIPERACILLIN SODIUM AND TAZOBACTAM SODIUM 4.5 G: 4; .5 INJECTION, POWDER, FOR SOLUTION INTRAVENOUS at 10:11

## 2023-11-13 RX ADMIN — WARFARIN SODIUM 8 MG: 5 TABLET ORAL at 08:11

## 2023-11-13 RX ADMIN — SODIUM CHLORIDE 500 ML: 9 INJECTION, SOLUTION INTRAVENOUS at 08:11

## 2023-11-13 RX ADMIN — Medication 2000 MG: at 09:11

## 2023-11-13 RX ADMIN — SODIUM BICARBONATE 650 MG TABLET 650 MG: at 12:11

## 2023-11-13 RX ADMIN — SODIUM CHLORIDE 250 ML: 9 INJECTION, SOLUTION INTRAVENOUS at 02:11

## 2023-11-13 NOTE — PLAN OF CARE
[facility]  Initial [facility]charge Assessment       Primary Care Provider: Flynn Delarosa MD    Admission Diagnosis: Fever [R50.9]  Severe sepsis [A41.9, R65.20]    Admission Date: 11/12/2023  Expected Discharge Date:     Transition of Care Barriers: None    Payor: BLUE CROSS BLUE SHIELD / Plan: BCBS BLUE SAVER PPO - HD / Product Type: PPO /     Extended Emergency Contact Information  Primary Emergency Contact: ERLINDA VERDUGO  Mobile Phone: [phone]  Relation: Son  Preferred language: English   needed? No  Secondary Emergency Contact: ARTUR VERDUGO  Mobile Phone: [phone]  Relation: Relative  Preferred language: English   needed? No    Discharge Plan A: Home  Discharge Plan B: Home      [id number] - [place], [place] - [facility] [address] [facility] [address] [address]  Phone: [phone] Fax: [phone]      Initial Assessment (most recent)       Adult Discharge Assessment - 11/13/23 0758          Discharge Assessment    Assessment Type Discharge Planning Assessment     Confirmed/corrected address, phone number and insurance Yes     Source of Information patient     When was your last doctors appointment? --   About 2 weeks ago.    Communicated CATALINA with patient/caregiver Yes     Reason For Admission Fever and not responding     People in Home significant other;parent(s)     Do you expect to return to your current living situation? Yes     Do you have help at home or someone to help you manage your care at home? Yes     Who are your caregiver(s) and their phone number(s)? Stanleyanders and mother Richa     Prior to hospitilization cognitive status: Alert/Oriented     Current cognitive status: Not Oriented to Time     Walking or Climbing Stairs --   Independent prior to admission    Dressing/Bathing --   Independent prior to admission    Equipment Currently Used at Home none     Readmission within 30 days? No     Patient currently  being followed by outpatient case management? No     Do you currently have service(s) that help you manage your care at home? No     Do you take prescription medications? Yes     Do you have prescription coverage? Yes     Coverage BCBS     Do you have any problems affording any of your prescribed medications? No     Is the patient taking medications as prescribed? yes     Who is going to help you get home at discharge? Significant other Marjorie and mother Richa     How do you get to doctors appointments? car, drives self     Do you take coumadin? No     DME Needed Upon Discharge  none     Discharge Plan discussed with: Patient;Parent(s)     Name(s) and Number(s) Richa Liner [phone]     Transition of Care Barriers None     Discharge Plan A Home     Discharge Plan B Home        Physical Activity    On average, how many days per week do you engage in moderate to strenuous exercise (like a brisk walk)? 5 days     On average, how many minutes do you engage in exercise at this level? 150+ min        Financial Resource Strain    How hard is it for you to pay for the very basics like food, housing, medical care, and heating? Not hard at all        Housing Stability    In the last 12 months, was there a time when you were not able to pay the mortgage or rent on time? No     In the last 12 months, how many places have you lived? 1     In the last 12 months, was there a time when you did not have a steady place to sleep or slept in a shelter (including now)? No        Transportation Needs    In the past 12 months, has lack of transportation kept you from medical appointments or from getting medications? No     In the past 12 months, has lack of transportation kept you from meetings, work, or from getting things needed for daily living? No        Food Insecurity    Within the past 12 months, you worried that your food would run out before you got the money to buy more. Never true     Within the past 12 months, the  food you bought just didn't last and you didn't have money to get more. Never true        Stress    Do you feel stress - tense, restless, nervous, or anxious, or unable to sleep at night because your mind is troubled all the time - these days? Not at all        Social Connections    In a typical week, how many times do you talk on the phone with family, friends, or neighbors? More than three times a week     How often do you get together with friends or relatives? More than three times a week     How often do you attend Advent or Moravian services? Never     Do you belong to any clubs or organizations such as Advent groups, unions, fraternal or athletic groups, or school groups? No     How often do you attend meetings of the clubs or organizations you belong to? Never     Are you , , , , never , or living with a partner?         Alcohol Use    Q1: How often do you have a drink containing alcohol? Never     Q2: How many drinks containing alcohol do you have on a typical day when you are drinking? Patient does not drink     Q3: How often do you have six or more drinks on one occasion? Never        OTHER    Name(s) of People in Home Richa Liner [phone] and significant other Sierra MELENDEZ completed at bedside w/patient's mother present. Patient was independent prior to admission and still works as a . No anticipated needs at this time. CM/SW to remain available. Encouraged to call with any questions or concerns.

## 2023-11-13 NOTE — HPI
Chief Complaint   Patient presents with    Altered Mental Status       AMS status onset this am and fever since 3 pm yesterday. Tylenol given approximately 1 hr PTA. Recent cellulitis.      63-year-old male with a history of hypertension, aortic aneurysm, mechanical heart valve on Coumadin presents the emergency department with fever since yesterday with altered mental status, given Tylenol this morning, unknown mg.  He presents tachypneic, oxygen sat 85% on room air, oral temperature of 102.5° F, tachycardic.  No known exposure to COVID.  He does answer some questions, but appears somewhat lethargic.  Denies chest pain. Family states he is having some nausea vomiting and diarrhea as well.  History gathered from family members.    ED course: Vital signs at time of arrival temperature 102.5F, , RR 28, /57 mmHg, SpO2 85% on room air. Sepsis work up, lactate 8.3, WBC 14.37, Hg 14.4, HCT 40.1, . Serum Na 139, K 3.9, Cl 110, CO2 19, Glu 179, BUN/Cr 30/2 (baseline Cr 0.91), Ca 8.3, Alb 3.4, Tbili 3, AST 93, ALT 47. Cardiac work up with NT proBNP 4573, EKG tracings sinus tachycardia, no ST-T wave elevation. Procalcitonin 32.56.   Patient started on broad spectrum antibiotics and received sepsis IVF bolus.   Patient's mental status has improved since his fever has come down answering questions appropriately per ER Attending's notes.     Patient admitted to medicine service for continued treatment of sepsis with LEEANN secondary to bacteremia and pneumonia. Patient negative for COVID19, influenza, RSV. Blood cx x2 PCR positive for streptococcus spp. Continue with respiratory support with BiPAP continuous.

## 2023-11-13 NOTE — ASSESSMENT & PLAN NOTE
Per ER note, patient more alert and appropriately responding to questions after IVF, antipyretics and control of elevated temperature. Continue to monitor.

## 2023-11-13 NOTE — PLAN OF CARE
Plan of care reviewed with the patient and family. Patient has been on continuous BiPAP since after lunch, sats remaining in the 95-97% range. Patient appears more comfortable on BiPAP at this time.

## 2023-11-13 NOTE — ASSESSMENT & PLAN NOTE
This patient does have evidence of infective focus  My overall impression is sepsis.  Source: Respiratory and bacteremia  Antibiotics given-   Antibiotics (72h ago, onward)      Start     Stop Route Frequency Ordered    11/14/23 0900  cefTRIAXone (ROCEPHIN) 2 g in dextrose 5 % in water (D5W) 100 mL IVPB (MB+)         -- IV Every 24 hours (non-standard times) 11/13/23 1119          Latest lactate reviewed-  Recent Labs   Lab 11/13/23  0130 11/13/23  0620 11/13/23  1621   LACTATE 6.0* 6.9* 3.7*     Organ dysfunction indicated by Acute kidney injury, Acute respiratory failure, Acute heart failure, and Encephalopathy    Fluid challenge Ideal Body Weight- The patient's ideal body weight is Ideal body weight: 82.2 kg (181 lb 3.5 oz) which will be used to calculate fluid bolus of 30 ml/kg for treatment of septic shock.      Post- resuscitation assessment Yes Perfusion exam was performed within 6 hours of septic shock presentation after bolus shows Adequate tissue perfusion assessed by non-invasive monitoring     Will Not start Pressors- Levophed for MAP of 65  Source control achieved by: IVF boluses and IV antibiotics

## 2023-11-13 NOTE — H&P
[facility] Medicine  History & Physical    Patient Name: Chapo Rosario  MRN: [id number]  Patient Class: IP- Inpatient  Admission Date: 11/12/2023  Attending Physician: Joon Romero DO   Primary Care Provider: Flynn Delarosa MD         Patient information was obtained from patient, spouse/SO, and ER records.     Subjective:     Principal Problem:Bacteremia    Chief Complaint:   Chief Complaint   Patient presents with    Altered Mental Status     AMS status onset this am and fever since 3 pm yesterday. Tylenol given approximately 1 hr PTA. Recent cellulitis.        HPI:   Chief Complaint   Patient presents with    Altered Mental Status       AMS status onset this am and fever since 3 pm yesterday. Tylenol given approximately 1 hr PTA. Recent cellulitis.      63-year-old male with a history of hypertension, aortic aneurysm, mechanical heart valve on Coumadin presents the emergency department with fever since yesterday with altered mental status, given Tylenol this morning, unknown mg.  He presents tachypneic, oxygen sat 85% on room air, oral temperature of 102.5° F, tachycardic.  No known exposure to COVID.  He does answer some questions, but appears somewhat lethargic.  Denies chest pain. Family states he is having some nausea vomiting and diarrhea as well.  History gathered from family members.    ED course: Vital signs at time of arrival temperature 102.5F, , RR 28, /57 mmHg, SpO2 85% on room air. Sepsis work up, lactate 8.3, WBC 14.37, Hg 14.4, HCT 40.1, . Serum Na 139, K 3.9, Cl 110, CO2 19, Glu 179, BUN/Cr 30/2 (baseline Cr 0.91), Ca 8.3, Alb 3.4, Tbili 3, AST 93, ALT 47. Cardiac work up with NT proBNP 4573, EKG tracings sinus tachycardia, no ST-T wave elevation. Procalcitonin 32.56.   Patient started on broad spectrum antibiotics and received sepsis IVF bolus.   Patient's mental status has improved since his fever has come down answering questions appropriately per  ER Attending's notes.     Patient admitted to medicine service for continued treatment of sepsis with LEEANN secondary to bacteremia and pneumonia. Patient negative for COVID19, influenza, RSV. Blood cx x2 PCR positive for streptococcus spp. Continue with respiratory support with BiPAP continuous.       Past Medical History:   Diagnosis Date    Aortic aneurysm     Hypertension        Past Surgical History:   Procedure Laterality Date    APPENDECTOMY      CARDIAC SURGERY  2013    CHOLECYSTECTOMY      NECK SURGERY         Review of patient's allergies indicates:  No Known Allergies    No current facility-administered medications on file prior to encounter.     Current Outpatient Medications on File Prior to Encounter   Medication Sig    amLODIPine (NORVASC) 5 MG tablet Take 5 mg by mouth once daily.    aspirin (ECOTRIN) 81 MG EC tablet Take 81 mg by mouth once daily.    docusate sodium (COLACE) 100 MG capsule Take 100 mg by mouth 2 (two) times daily.    fenofibric acid (FIBRICOR) 135 mg CpDR Take 135 mg by mouth once daily.    tadalafiL (CIALIS) 5 MG tablet Take 5 mg by mouth every evening.    vitamin D 1000 units Tab Take 5,000 Units by mouth once daily.    warfarin (COUMADIN) 6 MG tablet Take 4 mg by mouth Daily. 4 mg every Tuesday and Thursday. 8 mg on Sunday, Monday, Wednesday, Friday, and Saturday     Family History    None       Tobacco Use    Smoking status: Former    Smokeless tobacco: Not on file   Substance and Sexual Activity    Alcohol use: Not on file    Drug use: Not on file    Sexual activity: Not on file     Review of Systems   Constitutional:  Positive for activity change, appetite change, chills, fatigue and fever.   HENT:  Negative for sore throat.    Respiratory:  Positive for cough and shortness of breath. Negative for chest tightness and wheezing.    Cardiovascular:  Negative for chest pain, palpitations and leg swelling.   Gastrointestinal:  Positive for diarrhea and nausea. Negative for abdominal  distention, abdominal pain, blood in stool, constipation and vomiting.   Musculoskeletal:  Negative for back pain, joint swelling, neck pain and neck stiffness.   Skin:  Negative for pallor and wound.   Neurological:  Positive for weakness. Negative for dizziness, seizures, syncope, speech difficulty, light-headedness and numbness.   Psychiatric/Behavioral:  Positive for confusion. Negative for agitation, behavioral problems, dysphoric mood and sleep disturbance. The patient is not nervous/anxious and is not hyperactive.      Objective:     Vital Signs (Most Recent):  Temp: 99.5 °F (37.5 °C) (11/13/23 0543)  Pulse: (!) 123 (11/13/23 0749)  Resp: (!) 36 (11/13/23 0543)  BP: 107/67 (11/13/23 0543)  SpO2: (!) 90 % (11/13/23 0702) Vital Signs (24h Range):  Temp:  [95.9 °F (35.5 °C)-102.5 °F (39.2 °C)] 99.5 °F (37.5 °C)  Pulse:  [] 123  Resp:  [22-38] 36  SpO2:  [85 %-98 %] 90 %  BP: ()/(48-67) 107/67     Weight: (!) 137.6 kg (303 lb 6.4 oz)  Body mass index is 38.95 kg/m².     Physical Exam  Vitals and nursing note reviewed. Exam conducted with a chaperone present.   Constitutional:       General: He is not in acute distress.     Appearance: He is obese. He is ill-appearing. He is not toxic-appearing or diaphoretic.   HENT:      Nose: No congestion or rhinorrhea.      Mouth/Throat:      Mouth: Mucous membranes are dry.      Pharynx: Oropharynx is clear. No oropharyngeal exudate or posterior oropharyngeal erythema.   Eyes:      Extraocular Movements: Extraocular movements intact.      Conjunctiva/sclera: Conjunctivae normal.   Cardiovascular:      Rate and Rhythm: Regular rhythm. Tachycardia present.   Pulmonary:      Breath sounds: No wheezing.      Comments: O2 2L via NC  Abdominal:      Tenderness: There is no right CVA tenderness or left CVA tenderness.      Comments: protuberant   Musculoskeletal:         General: No swelling.      Cervical back: Normal range of motion and neck supple. No rigidity or  tenderness.      Right lower leg: No edema (trace bilaterally).      Left lower leg: Edema present.   Lymphadenopathy:      Cervical: No cervical adenopathy.   Skin:     General: Skin is warm and dry.      Capillary Refill: Capillary refill takes less than 2 seconds.   Neurological:      General: No focal deficit present.      Mental Status: He is alert and oriented to person, place, and time. Mental status is at baseline.      Cranial Nerves: No cranial nerve deficit.   Psychiatric:         Mood and Affect: Mood normal.         Behavior: Behavior normal.                Significant Labs: All pertinent labs within the past 24 hours have been reviewed.    ABGs:   Recent Labs   Lab 11/12/23  0852   PH 7.328*   PCO2 29.7*   HCO3 17.2*   POCSATURATED 93.7*   PO2 61.6*     Bilirubin:   Recent Labs   Lab 11/12/23  0837 11/13/23  0526   BILITOT 3.0* 4.3*     Blood Culture:   Recent Labs   Lab 11/12/23  0836 11/12/23  0837   LABBLOO Gram stain peds bottle: Gram positive cocci in chains resembling Strep  Positive results previously called 11/12/2023  22:38  Gram stain aer bottle: Gram positive cocci in chains resembling Strep  Positive results previously called 11/13/2023  01:05  STREPTOCOCCUS GROUP G  Susceptibility pending  Beta-hemolytic streptococci are routinely susceptible to   penicillins,cephalosporins and carbapenems.  * Gram stain aer bottle: Gram positive cocci in chains resembling Strep  Results called to and read back by: VALENTINO GALLARDO 11/12/2023  21:16  Gram stain julia bottle: Gram positive cocci in chains resembling Strep  Positive results previously called 11/12/2023  STREPTOCOCCUS GROUP G  Beta-hemolytic streptococci are routinely susceptible to   penicillins,cephalosporins and carbapenems.  For susceptibility see order #[id number]  *     BMP:   Recent Labs   Lab 11/13/23  0526   *      K 4.2   *   CO2 17*   BUN 48*   CREATININE 2.1*   CALCIUM 7.9*     CBC:   Recent Labs   Lab  11/12/23  0837 11/13/23  0527   WBC 14.37* 13.21*   HGB 14.4 13.6*   HCT 40.1 39.2*   * 88*     CMP:   Recent Labs   Lab 11/12/23  0837 11/13/23  0526    142   K 3.9 4.2    113*   CO2 19* 17*   * 155*   BUN 30* 48*   CREATININE 2.0* 2.1*   CALCIUM 8.3* 7.9*   PROT 7.3 7.3   ALBUMIN 3.4* 3.3*   BILITOT 3.0* 4.3*   ALKPHOS 51* 34*   AST 93* 136*   ALT 47* 72*   ANIONGAP 10 12*     Coagulation:   Recent Labs   Lab 11/12/23  0837 11/13/23  0620   INR 3.0* 2.8*   APTT 35.6*  --      Lactic Acid:   Recent Labs   Lab 11/13/23  0130 11/13/23  0620 11/13/23  1621   LACTATE 6.0* 6.9* 3.7*     Urine Studies:   Recent Labs   Lab 11/12/23  0848   COLORU Orange*   APPEARANCEUA Cloudy*   PHUR 6.0   SPECGRAV >=1.030*   PROTEINUA 1+*   GLUCUA Negative   KETONESU Negative   BILIRUBINUA 1+*   OCCULTUA Trace*   NITRITE Positive*   UROBILINOGEN 1.0   LEUKOCYTESUR Trace*   RBCUA 4   WBCUA 10*   BACTERIA Negative   SQUAMEPITHEL 4   HYALINECASTS 0     X-Ray Chest AP Portable  Narrative: EXAMINATION:  XR CHEST AP PORTABLE    CLINICAL HISTORY:  Sepsis;    TECHNIQUE:  portable chest x-ray    COMPARISON:  10/31/2019    FINDINGS:  Cardiomegaly and prior heart surgery.  There is suggestion of some patchy infiltrate or edema greater on the left  Impression: Cardiomegaly and prior heart surgery with suggestion of mild interstitial infiltrate or edema slightly greater on the left    Electronically signed by: Nury Vargas MD  Date:    11/12/2023  Time:    11:31     Significant Imaging: I have reviewed all pertinent imaging results/findings within the past 24 hours.  Assessment/Plan:     * Bacteremia  11/12 Blood Cx x2 growing strep spp. identified via PCR. Patient in 2018 hx of group B strep bacteremia.   Initial vanc zosyn x1 will change to rocephin per sensitivity and susceptibility study.   - continue supportive care  - f/u echocardiogram          Thrombocytopenia  Patient was found to have thrombocytopenia, the likely  etiology is secondary to sepsis/infection, will monitor the platelets Daily. Will transfuse if platelet count is <20k. Hold DVT prophylaxis if platelets are <50k. The patient's platelet results have been reviewed and are listed below.  Recent Labs   Lab 11/13/23  0527   PLT 88*         Elevated brain natriuretic peptide (BNP) level  Associated tachycardia. Likely reactive to current infective focus with growth of strep spp from blood cx samples.   - f/u echocardiogram  - f/u cardiology consult      Encephalopathy, metabolic  Per ER note, patient more alert and appropriately responding to questions after IVF, antipyretics and control of elevated temperature. Continue to monitor.       LEEANN (acute kidney injury)  Patient with acute kidney injury/acute renal failure likely due to pre-renal azotemia due to dehydration LEEANN is currently stable. Baseline creatinine  0.91  - Labs reviewed- Renal function/electrolytes with Estimated Creatinine Clearance: 54.5 mL/min (A) (based on SCr of 2.1 mg/dL (H)). according to latest data. Monitor urine output and serial BMP and adjust therapy as needed. Avoid nephrotoxins and renally dose meds for GFR listed above.    Fever  See above.     Severe sepsis  This patient does have evidence of infective focus  My overall impression is sepsis.  Source: Respiratory and bacteremia  Antibiotics given-   Antibiotics (72h ago, onward)      Start     Stop Route Frequency Ordered    11/14/23 0900  cefTRIAXone (ROCEPHIN) 2 g in dextrose 5 % in water (D5W) 100 mL IVPB (MB+)         -- IV Every 24 hours (non-standard times) 11/13/23 1119          Latest lactate reviewed-  Recent Labs   Lab 11/13/23  0130 11/13/23  0620 11/13/23  1621   LACTATE 6.0* 6.9* 3.7*     Organ dysfunction indicated by Acute kidney injury, Acute respiratory failure, Acute heart failure, and Encephalopathy    Fluid challenge Ideal Body Weight- The patient's ideal body weight is Ideal body weight: 82.2 kg (181 lb 3.5 oz) which  will be used to calculate fluid bolus of 30 ml/kg for treatment of septic shock.      Post- resuscitation assessment Yes Perfusion exam was performed within 6 hours of septic shock presentation after bolus shows Adequate tissue perfusion assessed by non-invasive monitoring     Will Not start Pressors- Levophed for MAP of 65  Source control achieved by: IVF boluses and IV antibiotics    Mechanical heart valve present  Lab Results   Component Value Date    INR 2.8 (H) 11/13/2023    INR 3.0 (H) 11/12/2023    INR 2.3 (H) 07/29/2023   Home warfarin.         VTE Risk Mitigation (From admission, onward)           Ordered     warfarin (COUMADIN) tablet 4 mg  Every Tues, Thurs        See Hyperspace for full Linked Orders Report.    11/12/23 1433     warfarin split tablet 8 mg  Once per day on Sun Mon Wed Fri Sat        See Hyperspace for full Linked Orders Report.    11/12/23 1433     IP VTE HIGH RISK PATIENT  Once         11/12/23 1312     Place sequential compression device  Until discontinued         11/12/23 1312     Place AINSLEY hose  Until discontinued         11/12/23 1312                  Joon Romero DO  Department of Hospital Medicine  [facility]

## 2023-11-13 NOTE — PLAN OF CARE
Plan of care reviewed with patient. Pt is free of falls and injury. NADN. Pt tolerating medications well. Pain controlled with PRN pain meds. Patient had one episode of nausea, nausea controlled with PRN nausea meds. Questions and concerns answered.

## 2023-11-13 NOTE — PROGRESS NOTES
VANCOMYCIN DOSING BY PHARMACY DISCONTINUATION NOTE    Chapo Rosario is a 63 y.o. male who had been consulted for vancomycin dosing.    The pharmacy consult for vancomycin dosing has been discontinued.     Vancomycin Dosing by Pharmacy Consult will sign-off. Please reconsult if necessary. Thank you for allowing us to participate in this patient's care.     Thank you for the consult,   Kiel Freeman, Pharm.D.  Inpatient Pharmacist  EXT [phone]

## 2023-11-13 NOTE — SUBJECTIVE & OBJECTIVE
Past Medical History:   Diagnosis Date    Aortic aneurysm     Hypertension        Past Surgical History:   Procedure Laterality Date    APPENDECTOMY      CARDIAC SURGERY  2013    CHOLECYSTECTOMY      NECK SURGERY         Review of patient's allergies indicates:  No Known Allergies    No current facility-administered medications on file prior to encounter.     Current Outpatient Medications on File Prior to Encounter   Medication Sig    amLODIPine (NORVASC) 5 MG tablet Take 5 mg by mouth once daily.    aspirin (ECOTRIN) 81 MG EC tablet Take 81 mg by mouth once daily.    docusate sodium (COLACE) 100 MG capsule Take 100 mg by mouth 2 (two) times daily.    fenofibric acid (FIBRICOR) 135 mg CpDR Take 135 mg by mouth once daily.    tadalafiL (CIALIS) 5 MG tablet Take 5 mg by mouth every evening.    vitamin D 1000 units Tab Take 5,000 Units by mouth once daily.    warfarin (COUMADIN) 6 MG tablet Take 4 mg by mouth Daily. 4 mg every Tuesday and Thursday. 8 mg on Sunday, Monday, Wednesday, Friday, and Saturday     Family History    None       Tobacco Use    Smoking status: Former    Smokeless tobacco: Not on file   Substance and Sexual Activity    Alcohol use: Not on file    Drug use: Not on file    Sexual activity: Not on file     Review of Systems   Constitutional:  Positive for activity change, appetite change, chills, fatigue and fever.   HENT:  Negative for sore throat.    Respiratory:  Positive for cough and shortness of breath. Negative for chest tightness and wheezing.    Cardiovascular:  Negative for chest pain, palpitations and leg swelling.   Gastrointestinal:  Positive for diarrhea and nausea. Negative for abdominal distention, abdominal pain, blood in stool, constipation and vomiting.   Musculoskeletal:  Negative for back pain, joint swelling, neck pain and neck stiffness.   Skin:  Negative for pallor and wound.   Neurological:  Positive for weakness. Negative for dizziness, seizures, syncope, speech difficulty,  light-headedness and numbness.   Psychiatric/Behavioral:  Positive for confusion. Negative for agitation, behavioral problems, dysphoric mood and sleep disturbance. The patient is not nervous/anxious and is not hyperactive.      Objective:     Vital Signs (Most Recent):  Temp: 99.5 °F (37.5 °C) (11/13/23 0543)  Pulse: (!) 123 (11/13/23 0749)  Resp: (!) 36 (11/13/23 0543)  BP: 107/67 (11/13/23 0543)  SpO2: (!) 90 % (11/13/23 0702) Vital Signs (24h Range):  Temp:  [95.9 °F (35.5 °C)-102.5 °F (39.2 °C)] 99.5 °F (37.5 °C)  Pulse:  [] 123  Resp:  [22-38] 36  SpO2:  [85 %-98 %] 90 %  BP: ()/(48-67) 107/67     Weight: (!) 137.6 kg (303 lb 6.4 oz)  Body mass index is 38.95 kg/m².     Physical Exam  Vitals and nursing note reviewed. Exam conducted with a chaperone present.   Constitutional:       General: He is not in acute distress.     Appearance: He is obese. He is ill-appearing. He is not toxic-appearing or diaphoretic.   HENT:      Nose: No congestion or rhinorrhea.      Mouth/Throat:      Mouth: Mucous membranes are dry.      Pharynx: Oropharynx is clear. No oropharyngeal exudate or posterior oropharyngeal erythema.   Eyes:      Extraocular Movements: Extraocular movements intact.      Conjunctiva/sclera: Conjunctivae normal.   Cardiovascular:      Rate and Rhythm: Regular rhythm. Tachycardia present.   Pulmonary:      Breath sounds: No wheezing.      Comments: O2 2L via NC  Abdominal:      Tenderness: There is no right CVA tenderness or left CVA tenderness.      Comments: protuberant   Musculoskeletal:         General: No swelling.      Cervical back: Normal range of motion and neck supple. No rigidity or tenderness.      Right lower leg: No edema (trace bilaterally).      Left lower leg: Edema present.   Lymphadenopathy:      Cervical: No cervical adenopathy.   Skin:     General: Skin is warm and dry.      Capillary Refill: Capillary refill takes less than 2 seconds.   Neurological:      General: No focal  deficit present.      Mental Status: He is alert and oriented to person, place, and time. Mental status is at baseline.      Cranial Nerves: No cranial nerve deficit.   Psychiatric:         Mood and Affect: Mood normal.         Behavior: Behavior normal.                Significant Labs: All pertinent labs within the past 24 hours have been reviewed.    ABGs:   Recent Labs   Lab 11/12/23 0852   PH 7.328*   PCO2 29.7*   HCO3 17.2*   POCSATURATED 93.7*   PO2 61.6*     Bilirubin:   Recent Labs   Lab 11/12/23 0837 11/13/23 0526   BILITOT 3.0* 4.3*     Blood Culture:   Recent Labs   Lab 11/12/23 0836 11/12/23 0837   LABBLOO Gram stain peds bottle: Gram positive cocci in chains resembling Strep  Positive results previously called 11/12/2023  22:38  Gram stain aer bottle: Gram positive cocci in chains resembling Strep  Positive results previously called 11/13/2023  01:05  STREPTOCOCCUS GROUP G  Susceptibility pending  Beta-hemolytic streptococci are routinely susceptible to   penicillins,cephalosporins and carbapenems.  * Gram stain aer bottle: Gram positive cocci in chains resembling Strep  Results called to and read back by: VALENTINO GALLARDO 11/12/2023  21:16  Gram stain julia bottle: Gram positive cocci in chains resembling Strep  Positive results previously called 11/12/2023  STREPTOCOCCUS GROUP G  Beta-hemolytic streptococci are routinely susceptible to   penicillins,cephalosporins and carbapenems.  For susceptibility see order #[id number]  *     BMP:   Recent Labs   Lab 11/13/23 0526   *      K 4.2   *   CO2 17*   BUN 48*   CREATININE 2.1*   CALCIUM 7.9*     CBC:   Recent Labs   Lab 11/12/23 0837 11/13/23 0527   WBC 14.37* 13.21*   HGB 14.4 13.6*   HCT 40.1 39.2*   * 88*     CMP:   Recent Labs   Lab 11/12/23 0837 11/13/23 0526    142   K 3.9 4.2    113*   CO2 19* 17*   * 155*   BUN 30* 48*   CREATININE 2.0* 2.1*   CALCIUM 8.3* 7.9*   PROT 7.3 7.3   ALBUMIN 3.4*  3.3*   BILITOT 3.0* 4.3*   ALKPHOS 51* 34*   AST 93* 136*   ALT 47* 72*   ANIONGAP 10 12*     Coagulation:   Recent Labs   Lab 11/12/23  0837 11/13/23  0620   INR 3.0* 2.8*   APTT 35.6*  --      Lactic Acid:   Recent Labs   Lab 11/13/23  0130 11/13/23  0620 11/13/23  1621   LACTATE 6.0* 6.9* 3.7*     Urine Studies:   Recent Labs   Lab 11/12/23  0848   COLORU Orange*   APPEARANCEUA Cloudy*   PHUR 6.0   SPECGRAV >=1.030*   PROTEINUA 1+*   GLUCUA Negative   KETONESU Negative   BILIRUBINUA 1+*   OCCULTUA Trace*   NITRITE Positive*   UROBILINOGEN 1.0   LEUKOCYTESUR Trace*   RBCUA 4   WBCUA 10*   BACTERIA Negative   SQUAMEPITHEL 4   HYALINECASTS 0     X-Ray Chest AP Portable  Narrative: EXAMINATION:  XR CHEST AP PORTABLE    CLINICAL HISTORY:  Sepsis;    TECHNIQUE:  portable chest x-ray    COMPARISON:  10/31/2019    FINDINGS:  Cardiomegaly and prior heart surgery.  There is suggestion of some patchy infiltrate or edema greater on the left  Impression: Cardiomegaly and prior heart surgery with suggestion of mild interstitial infiltrate or edema slightly greater on the left    Electronically signed by: Nury Vargas MD  Date:    11/12/2023  Time:    11:31     Significant Imaging: I have reviewed all pertinent imaging results/findings within the past 24 hours.

## 2023-11-13 NOTE — ASSESSMENT & PLAN NOTE
Associated tachycardia. Likely reactive to current infective focus with growth of strep spp from blood cx samples.   - f/u echocardiogram  - f/u cardiology consult

## 2023-11-13 NOTE — ASSESSMENT & PLAN NOTE
Patient was found to have thrombocytopenia, the likely etiology is secondary to sepsis/infection, will monitor the platelets Daily. Will transfuse if platelet count is <20k. Hold DVT prophylaxis if platelets are <50k. The patient's platelet results have been reviewed and are listed below.  Recent Labs   Lab 11/13/23  0527   PLT 88*

## 2023-11-13 NOTE — ASSESSMENT & PLAN NOTE
11/12 Blood Cx x2 growing strep spp. identified via PCR. Patient in 2018 hx of group B strep bacteremia.   Initial vanc zosyn x1 will change to rocephin per sensitivity and susceptibility study.   - continue supportive care  - f/u echocardiogram

## 2023-11-13 NOTE — ASSESSMENT & PLAN NOTE
Patient with acute kidney injury/acute renal failure likely due to pre-renal azotemia due to dehydration LEEANN is currently stable. Baseline creatinine  0.91  - Labs reviewed- Renal function/electrolytes with Estimated Creatinine Clearance: 54.5 mL/min (A) (based on SCr of 2.1 mg/dL (H)). according to latest data. Monitor urine output and serial BMP and adjust therapy as needed. Avoid nephrotoxins and renally dose meds for GFR listed above.

## 2023-11-13 NOTE — ASSESSMENT & PLAN NOTE
Lab Results   Component Value Date    INR 2.8 (H) 11/13/2023    INR 3.0 (H) 11/12/2023    INR 2.3 (H) 07/29/2023   Home warfarin.

## 2023-11-13 NOTE — PROGRESS NOTES
Pharmacokinetic Assessment Follow Up: IV Vancomycin    Vancomycin serum concentration assessment(s):    The random level was drawn correctly and can be used to guide therapy at this time. The measurement is below the desired definitive target range of 15 to 20 mcg/mL.    Vancomycin Regimen Plan:    One time dose of Vancomycin 2500 mg to be given today.     Re-dose when the random level is less than 20 mcg/mL, next level to be drawn at 0430 on 11/14/2023    Drug levels (last 3 results):  Recent Labs   Lab Result Units 11/13/23  0526   Vancomycin, Random ug/mL 8.2       Pharmacy will continue to follow and monitor vancomycin.    Please contact pharmacy at extension [phone] for questions regarding this assessment.    Thank you for the consult,   Jaye Prather       Patient brief summary:  Chapo Rosario is a 63 y.o. male initiated on antimicrobial therapy with IV Vancomycin for treatment of sepsis    The patient's current regimen is pulse dosing due to renal function     Drug Allergies:   Review of patient's allergies indicates:  No Known Allergies    Actual Body Weight:   137.6 kg    BMI: 38.95 kg/m2    Renal Function:   Estimated Creatinine Clearance: 54.5 mL/min (A) (based on SCr of 2.1 mg/dL (H)).,     Dialysis Method (if applicable):  N/A    CBC (last 72 hours):  Recent Labs   Lab Result Units 11/12/23  0837 11/13/23  0527   WBC K/uL 14.37* 13.21*   Hemoglobin g/dL 14.4 13.6*   Hematocrit % 40.1 39.2*   Platelets K/uL 119* 88*   Gran % % 88.0* 72.0   Lymph % % 3.0* 16.0*   Mono % % 1.0* 7.0   Eosinophil % % 0.0 0.0   Basophil % % 0.0 0.0   Differential Method  Manual Manual       Metabolic Panel (last 72 hours):  Recent Labs   Lab Result Units 11/12/23  0837 11/12/23  0848 11/13/23  0526   Sodium mmol/L 139  --  142   Potassium mmol/L 3.9  --  4.2   Chloride mmol/L 110  --  113*   CO2 mmol/L 19*  --  17*   Glucose mg/dL 179*  --  155*   Glucose, UA   --  Negative  --    BUN mg/dL 30*  --  48*   Creatinine mg/dL  2.0*  --  2.1*   Albumin g/dL 3.4*  --  3.3*   Total Bilirubin mg/dL 3.0*  --  4.3*   Alkaline Phosphatase U/L 51*  --  34*   AST U/L 93*  --  136*   ALT U/L 47*  --  72*       Vancomycin Administrations:  vancomycin given in the last 96 hours                     vancomycin 2 g in 0.9% sodium chloride 500 mL IVPB (mg) 2,000 mg New Bag 11/12/23 1049                    Microbiologic Results:  Microbiology Results (last 7 days)       Procedure Component Value Units Date/Time    Blood culture x two cultures. Draw prior to antibiotics. [[id number]] Collected: 11/12/23 0836    Order Status: Completed Specimen: Blood Updated: 11/13/23 0106     Blood Culture, Routine Gram stain peds bottle: Gram positive cocci in chains resembling Strep      Positive results previously called 11/12/2023  22:38      Gram stain aer bottle: Gram positive cocci in chains resembling Strep      Positive results previously called 11/13/2023  01:05    Narrative:      Aerobic and anaerobic    Rapid Organism ID by PCR (from Blood culture) [[id number]]  (Abnormal) Collected: 11/12/23 0837    Order Status: Completed Updated: 11/12/23 2231     Enterococcus faecalis Not Detected     Enterococcus faecium Not Detected     Listeria monocytogenes Not Detected     Staphylococcus spp. Not Detected     Staphylococcus aureus Not Detected     Staphylococcus epidermidis Not Detected     Staphylococcus lugdunensis Not Detected     Streptococcus species Detected     Streptococcus agalactiae Not Detected     Streptococcus pneumoniae Not Detected     Streptococcus pyogenes Not Detected     Acinetobacter calcoaceticus/baumannii complex Not Detected     Bacteroides fragilis Not Detected     Enterobacterales Not Detected     Enterobacter cloacae complex Not Detected     Escherichia coli Not Detected     Klebsiella aerogenes Not Detected     Klebsiella oxytoca Not Detected     Klebsiella pneumoniae group Not Detected     Proteus Not Detected     Salmonella sp Not Detected      Serratia marcescens Not Detected     Haemophilus influenzae Not Detected     Neisseria meningtidis Not Detected     Pseudomonas aeruginosa Not Detected     Stenotrophomonas maltophilia Not Detected     Candida albicans Not Detected     Candida auris Not Detected     Candida glabrata Not Detected     Candida krusei Not Detected     Candida parapsilosis Not Detected     Candida tropicalis Not Detected     Cryptococcus neoformans/gattii Not Detected     CTX-M (ESBL ) Test Not Applicable     IMP (Carbapenem resistant) Test Not Applicable     KPC resistance gene (Carbapenem resistant) Test Not Applicable     mcr-1  Test Not Applicable     mec A/C  Test Not Applicable     mec A/C and MREJ (MRSA) gene Test Not Applicable     NDM (Carbapenem resistant) Test Not Applicable     OXA-48-like (Carbapenem resistant) Test Not Applicable     van A/B (VRE gene) Test Not Applicable     VIM (Carbapenem resistant) Test Not Applicable    Narrative:      Aerobic and anaerobic    Blood culture x two cultures. Draw prior to antibiotics. [[id number]] Collected: 11/12/23 0837    Order Status: Completed Specimen: Blood Updated: 11/12/23 2117     Blood Culture, Routine Gram stain aer bottle: Gram positive cocci in chains resembling Strep      Results called to and read back by: VALENTINO GALLARDO 11/12/2023  21:16      Gram stain julia bottle: Gram positive cocci in chains resembling Strep      Positive results previously called 11/12/2023    Narrative:      Aerobic and anaerobic    Influenza A & B by Molecular [[id number]] Collected: 11/12/23 0834    Order Status: Completed Specimen: Nasopharyngeal Swab Updated: 11/12/23 0929     Influenza A, Molecular Negative     Influenza B, Molecular Negative     Flu A & B Source Nasal Swab

## 2023-11-14 ENCOUNTER — CLINICAL SUPPORT (OUTPATIENT)
Dept: CARDIOLOGY | Facility: HOSPITAL | Age: 63
DRG: 871 | End: 2023-11-14
Attending: STUDENT IN AN ORGANIZED HEALTH CARE EDUCATION/TRAINING PROGRAM
Payer: COMMERCIAL

## 2023-11-14 PROBLEM — I48.91 NEW ONSET A-FIB: Status: ACTIVE | Noted: 2023-11-14

## 2023-11-14 LAB
ALBUMIN SERPL BCP-MCNC: 3 G/DL (ref 3.5–5.2)
ALP SERPL-CCNC: 47 U/L (ref 55–135)
ALT SERPL W/O P-5'-P-CCNC: 62 U/L (ref 10–44)
ANION GAP SERPL CALC-SCNC: 5 MMOL/L (ref 3–11)
AST SERPL-CCNC: 89 U/L (ref 10–40)
BACTERIA BLD CULT: ABNORMAL
BASOPHILS # BLD AUTO: ABNORMAL K/UL (ref 0–0.2)
BASOPHILS NFR BLD: 0 % (ref 0–1.9)
BILIRUB SERPL-MCNC: 3.5 MG/DL (ref 0.1–1)
BUN SERPL-MCNC: 47 MG/DL (ref 8–23)
CALCIUM SERPL-MCNC: 8.2 MG/DL (ref 8.7–10.5)
CHLORIDE SERPL-SCNC: 110 MMOL/L (ref 95–110)
CO2 SERPL-SCNC: 25 MMOL/L (ref 23–29)
CREAT SERPL-MCNC: 1.5 MG/DL (ref 0.5–1.4)
DIFFERENTIAL METHOD: ABNORMAL
EOSINOPHIL # BLD AUTO: ABNORMAL K/UL (ref 0–0.5)
EOSINOPHIL NFR BLD: 0 % (ref 0–8)
ERYTHROCYTE [DISTWIDTH] IN BLOOD BY AUTOMATED COUNT: 15 % (ref 11.5–14.5)
EST. GFR  (NO RACE VARIABLE): 52.6 ML/MIN/1.73 M^2
GLUCOSE SERPL-MCNC: 230 MG/DL (ref 70–110)
HCT VFR BLD AUTO: 33.9 % (ref 40–54)
HGB BLD-MCNC: 11.9 G/DL (ref 14–18)
IMM GRANULOCYTES # BLD AUTO: ABNORMAL K/UL (ref 0–0.04)
IMM GRANULOCYTES NFR BLD AUTO: ABNORMAL % (ref 0–0.5)
INR PPP: 2.6 (ref 0.8–1.2)
LYMPHOCYTES # BLD AUTO: ABNORMAL K/UL (ref 1–4.8)
LYMPHOCYTES NFR BLD: 16 % (ref 18–48)
MCH RBC QN AUTO: 34.4 PG (ref 27–31)
MCHC RBC AUTO-ENTMCNC: 35.1 G/DL (ref 32–36)
MCV RBC AUTO: 98 FL (ref 82–98)
MONOCYTES # BLD AUTO: ABNORMAL K/UL (ref 0.3–1)
MONOCYTES NFR BLD: 7 % (ref 4–15)
NEUTROPHILS NFR BLD: 66 % (ref 38–73)
NEUTS BAND NFR BLD MANUAL: 11 %
NRBC BLD-RTO: 0 /100 WBC
PLATELET # BLD AUTO: 73 K/UL (ref 150–450)
PMV BLD AUTO: 11.1 FL (ref 9.2–12.9)
POTASSIUM SERPL-SCNC: 3.6 MMOL/L (ref 3.5–5.1)
PROT SERPL-MCNC: 6.8 G/DL (ref 6–8.4)
PROTHROMBIN TIME: 23.9 SEC (ref 9–12.5)
RBC # BLD AUTO: 3.46 M/UL (ref 4.6–6.2)
SODIUM SERPL-SCNC: 140 MMOL/L (ref 136–145)
WBC # BLD AUTO: 8.37 K/UL (ref 3.9–12.7)

## 2023-11-14 PROCEDURE — A4216 STERILE WATER/SALINE, 10 ML: HCPCS | Performed by: STUDENT IN AN ORGANIZED HEALTH CARE EDUCATION/TRAINING PROGRAM

## 2023-11-14 PROCEDURE — 80053 COMPREHEN METABOLIC PANEL: CPT | Performed by: STUDENT IN AN ORGANIZED HEALTH CARE EDUCATION/TRAINING PROGRAM

## 2023-11-14 PROCEDURE — 20000000 HC ICU ROOM

## 2023-11-14 PROCEDURE — 99233 SBSQ HOSP IP/OBS HIGH 50: CPT | Mod: ,,, | Performed by: STUDENT IN AN ORGANIZED HEALTH CARE EDUCATION/TRAINING PROGRAM

## 2023-11-14 PROCEDURE — 27000221 HC OXYGEN, UP TO 24 HOURS

## 2023-11-14 PROCEDURE — 99233 PR SUBSEQUENT HOSPITAL CARE,LEVL III: ICD-10-PCS | Mod: ,,, | Performed by: STUDENT IN AN ORGANIZED HEALTH CARE EDUCATION/TRAINING PROGRAM

## 2023-11-14 PROCEDURE — 93005 ELECTROCARDIOGRAM TRACING: CPT

## 2023-11-14 PROCEDURE — 85027 COMPLETE CBC AUTOMATED: CPT | Performed by: STUDENT IN AN ORGANIZED HEALTH CARE EDUCATION/TRAINING PROGRAM

## 2023-11-14 PROCEDURE — 93306 TTE W/DOPPLER COMPLETE: CPT

## 2023-11-14 PROCEDURE — 99900031 HC PATIENT EDUCATION (STAT)

## 2023-11-14 PROCEDURE — C1751 CATH, INF, PER/CENT/MIDLINE: HCPCS

## 2023-11-14 PROCEDURE — 93010 EKG 12-LEAD: ICD-10-PCS | Mod: ,,, | Performed by: INTERNAL MEDICINE

## 2023-11-14 PROCEDURE — 25000003 PHARM REV CODE 250: Performed by: STUDENT IN AN ORGANIZED HEALTH CARE EDUCATION/TRAINING PROGRAM

## 2023-11-14 PROCEDURE — 94660 CPAP INITIATION&MGMT: CPT

## 2023-11-14 PROCEDURE — 63600175 PHARM REV CODE 636 W HCPCS: Performed by: INTERNAL MEDICINE

## 2023-11-14 PROCEDURE — 93010 ELECTROCARDIOGRAM REPORT: CPT | Mod: ,,, | Performed by: INTERNAL MEDICINE

## 2023-11-14 PROCEDURE — 99900035 HC TECH TIME PER 15 MIN (STAT)

## 2023-11-14 PROCEDURE — 94761 N-INVAS EAR/PLS OXIMETRY MLT: CPT

## 2023-11-14 PROCEDURE — 85007 BL SMEAR W/DIFF WBC COUNT: CPT | Performed by: STUDENT IN AN ORGANIZED HEALTH CARE EDUCATION/TRAINING PROGRAM

## 2023-11-14 PROCEDURE — 36410 VNPNXR 3YR/> PHY/QHP DX/THER: CPT

## 2023-11-14 PROCEDURE — 76937 US GUIDE VASCULAR ACCESS: CPT

## 2023-11-14 PROCEDURE — 36415 COLL VENOUS BLD VENIPUNCTURE: CPT | Performed by: STUDENT IN AN ORGANIZED HEALTH CARE EDUCATION/TRAINING PROGRAM

## 2023-11-14 PROCEDURE — 85610 PROTHROMBIN TIME: CPT | Performed by: STUDENT IN AN ORGANIZED HEALTH CARE EDUCATION/TRAINING PROGRAM

## 2023-11-14 PROCEDURE — 63600175 PHARM REV CODE 636 W HCPCS: Performed by: STUDENT IN AN ORGANIZED HEALTH CARE EDUCATION/TRAINING PROGRAM

## 2023-11-14 RX ORDER — DILTIAZEM HYDROCHLORIDE 30 MG/1
30 TABLET, FILM COATED ORAL EVERY 6 HOURS
Status: DISCONTINUED | OUTPATIENT
Start: 2023-11-14 | End: 2023-11-16 | Stop reason: HOSPADM

## 2023-11-14 RX ORDER — DILTIAZEM HYDROCHLORIDE 5 MG/ML
25 INJECTION INTRAVENOUS ONCE
Status: COMPLETED | OUTPATIENT
Start: 2023-11-14 | End: 2023-11-14

## 2023-11-14 RX ORDER — SODIUM CHLORIDE 0.9 % (FLUSH) 0.9 %
10 SYRINGE (ML) INJECTION EVERY 6 HOURS
Status: DISCONTINUED | OUTPATIENT
Start: 2023-11-14 | End: 2023-11-16 | Stop reason: HOSPADM

## 2023-11-14 RX ORDER — SODIUM CHLORIDE 0.9 % (FLUSH) 0.9 %
10 SYRINGE (ML) INJECTION
Status: DISCONTINUED | OUTPATIENT
Start: 2023-11-14 | End: 2023-11-16 | Stop reason: HOSPADM

## 2023-11-14 RX ORDER — DILTIAZEM HYDROCHLORIDE 5 MG/ML
5 INJECTION INTRAVENOUS ONCE
Status: COMPLETED | OUTPATIENT
Start: 2023-11-14 | End: 2023-11-14

## 2023-11-14 RX ORDER — DILTIAZEM HCL 1 MG/ML
0-15 INJECTION, SOLUTION INTRAVENOUS CONTINUOUS
Status: DISCONTINUED | OUTPATIENT
Start: 2023-11-14 | End: 2023-11-14

## 2023-11-14 RX ORDER — DILTIAZEM HYDROCHLORIDE 5 MG/ML
20 INJECTION INTRAVENOUS ONCE
Status: COMPLETED | OUTPATIENT
Start: 2023-11-14 | End: 2023-11-14

## 2023-11-14 RX ORDER — MUPIROCIN 20 MG/G
OINTMENT TOPICAL 2 TIMES DAILY
Status: DISCONTINUED | OUTPATIENT
Start: 2023-11-14 | End: 2023-11-16 | Stop reason: HOSPADM

## 2023-11-14 RX ADMIN — METOROPROLOL TARTRATE 5 MG: 5 INJECTION, SOLUTION INTRAVENOUS at 01:11

## 2023-11-14 RX ADMIN — DILTIAZEM HYDROCHLORIDE 20 MG: 5 INJECTION, SOLUTION INTRAVENOUS at 04:11

## 2023-11-14 RX ADMIN — DILTIAZEM HYDROCHLORIDE 5 MG: 5 INJECTION, SOLUTION INTRAVENOUS at 02:11

## 2023-11-14 RX ADMIN — DILTIAZEM HYDROCHLORIDE 25 MG: 5 INJECTION INTRAVENOUS at 07:11

## 2023-11-14 RX ADMIN — SODIUM CHLORIDE 1000 ML: 9 INJECTION, SOLUTION INTRAVENOUS at 04:11

## 2023-11-14 RX ADMIN — ASPIRIN 81 MG: 81 TABLET, COATED ORAL at 10:11

## 2023-11-14 RX ADMIN — METOROPROLOL TARTRATE 5 MG: 5 INJECTION, SOLUTION INTRAVENOUS at 02:11

## 2023-11-14 RX ADMIN — SODIUM BICARBONATE 650 MG TABLET 650 MG: at 09:11

## 2023-11-14 RX ADMIN — MUPIROCIN: 20 OINTMENT TOPICAL at 09:11

## 2023-11-14 RX ADMIN — Medication 10 ML: at 11:11

## 2023-11-14 RX ADMIN — CEFTRIAXONE 2 G: 2 INJECTION, POWDER, FOR SOLUTION INTRAMUSCULAR; INTRAVENOUS at 10:11

## 2023-11-14 RX ADMIN — FAMOTIDINE 20 MG: 10 INJECTION, SOLUTION INTRAVENOUS at 08:11

## 2023-11-14 RX ADMIN — AMIODARONE HYDROCHLORIDE 150 MG: 1.5 INJECTION, SOLUTION INTRAVENOUS at 02:11

## 2023-11-14 RX ADMIN — AMIODARONE HYDROCHLORIDE 1 MG/MIN: 1.8 INJECTION, SOLUTION INTRAVENOUS at 08:11

## 2023-11-14 RX ADMIN — WARFARIN SODIUM 4 MG: 2.5 TABLET ORAL at 06:11

## 2023-11-14 RX ADMIN — AMIODARONE HYDROCHLORIDE 1 MG/MIN: 1.8 INJECTION, SOLUTION INTRAVENOUS at 03:11

## 2023-11-14 RX ADMIN — DILTIAZEM HYDROCHLORIDE 30 MG: 30 TABLET, FILM COATED ORAL at 11:11

## 2023-11-14 RX ADMIN — DILTIAZEM HYDROCHLORIDE 5 MG/HR: 5 INJECTION INTRAVENOUS at 11:11

## 2023-11-14 RX ADMIN — SODIUM BICARBONATE 650 MG TABLET 650 MG: at 10:11

## 2023-11-14 RX ADMIN — DILTIAZEM HYDROCHLORIDE 30 MG: 30 TABLET, FILM COATED ORAL at 06:11

## 2023-11-14 RX ADMIN — FAMOTIDINE 20 MG: 10 INJECTION, SOLUTION INTRAVENOUS at 10:11

## 2023-11-14 RX ADMIN — Medication 10 ML: at 06:11

## 2023-11-14 NOTE — NURSING
Spoke with answering service at [facility] concerning pt's current heart rate. Pt has been tachycardic since 0100. Pt had two episodes with runs of SVT prior to c/o chest pain with some nausea. Pt remains tachy after receiving Lopressor 5 mg x 3 and Cardizem 25 mg IVP and  ml bolus. Pt has been running 120s-140s jumping as high as 181. Pt currently denies pain and nausea. BiPAP in place with O2 sats 95%-97% will continue to monitor.

## 2023-11-14 NOTE — CARE UPDATE
11/14/23 1342   Patient Assessment/Suction   Level of Consciousness (AVPU) alert   Respiratory Effort Unlabored   Expansion/Accessory Muscles/Retractions no use of accessory muscles   Skin Integrity   Area Observed Left;Right;Bridge of nose   Skin Appearance without discoloration   Barrier used? Liquid Filled Cushion   PRE-TX-O2   Device (Oxygen Therapy) nasal cannula with humidification  (Pt would like to try NC at this time.)   $ Is the patient on Low Flow Oxygen? Yes   Flow (L/min) 4   Oxygen Concentration (%) 36   SpO2 96 %   Pulse Oximetry Type Continuous   $ Pulse Oximetry - Multiple Charge Pulse Oximetry - Multiple   Pulse 87   Resp (!) 35   Preset CPAP/BiPAP Settings   Mode Of Delivery Standby   Respiratory Evaluation   $ Care Plan Tech Time 15 min

## 2023-11-14 NOTE — ASSESSMENT & PLAN NOTE
Patient was found to have thrombocytopenia, the likely etiology is secondary to sepsis/infection, will monitor the platelets Daily. Will transfuse if platelet count is <20k. Hold DVT prophylaxis if platelets are <50k. The patient's platelet results have been reviewed and are listed below.  Recent Labs   Lab 11/14/23  0505   PLT 73*     Continue to monitor.

## 2023-11-14 NOTE — ASSESSMENT & PLAN NOTE
Patient with Paroxysmal (<7 days) atrial fibrillation which is uncontrolled currently with Calcium Channel Blocker. Patient is currently in atrial fibrillation. Anticoagulation indicated. Anticoagulation done with warfarin in therapeutic range  .  - continue cardizem gtt per protocol  - f/u cardiology consult

## 2023-11-14 NOTE — ASSESSMENT & PLAN NOTE
Per ER note, patient more alert and appropriately responding to questions after IVF, antipyretics and control of elevated temperature. Continue to monitor.   11/14 Back to baseline, endorsing feeling generalized weakness

## 2023-11-14 NOTE — ASSESSMENT & PLAN NOTE
11/12 Blood Cx x2 growing strep spp. identified via PCR. Patient in 2018 hx of group B strep bacteremia.   Initial vanc zosyn x1 will change to rocephin per sensitivity and susceptibility study.   11/14 continue with Rocephin, sensitive to Group G strep  - continue supportive care  - f/u echocardiogram

## 2023-11-14 NOTE — HOSPITAL COURSE
11/14 Overnight tachycardia 140-160s, given IV metoprolol 5 mg x3 with no response, follow up IV cardizem 30 mg given, but rate continues to fluctuate. EKG findings of new onset atrial fibrillation RVR. Denies chest pain, palpitations. Continuous BiPAP, SpO2 >96% on FiO2 30%. Tmax overnight 99.5F. For better rate control will start cardizem gtt. Follow up cardiology consult for arrhythmia and bacteremia in setting of mechanical heart valve. Continue with IV antibiotics and respiratory care.   11/15 Per cardiology, patient has been started on amiodarone gtt HR range 70-130s. Telemetry tracings atrial fibrillation. No elevated temperature. Renal function back to baseline, LFTs down trending, leukocytosis resolved. INR within therapeutic range. Continue rocephin for Group G bacteremia, pending echocardiogram findings, duration of 14 days required for treatment. Continue respiratory support with BiPAP and continuous O2. Follow cardiology recommendations for Afib.      11/16 Afebrile. No acute events overnight. Echocardiogram findings of atrial fibrillation, LVEF 55-70%, no wall motion abnormality, mild left atrial dilation, aortic mechanical valve in place, no evidence of valvular vegetation. HR 80-120s, SpO2 >97% on continuous O2 4L via nasal cannula, breathing more comfortably. On cardizem and amiodarone for rate and rhythm control. Continue rocephin D5 for bacteremia, if infection persists, then cardiology recommends GISELE to evaluated mechanical valve.   Patient raised concern for left first toe nail discoloration which has been spreading over the past two months. Appearance blue black at nail plate with irregular borders, more likely bruising from bleeding underneath nail bed. Patient denies trauma to foot or toe. Explained warfarin therapy may increase his risk for micro bleeds. Patient agrees to follow up outpatient with dermatology for further evaluation.       Discussed with Dr. Ortiz on patient's potential need  for cardioversion for atrial fibrillation and ventricular rate running >120 bpm not responding to medical therapy, may consider cardioversion and continue monitoring status of bacteremia with need for transesophageal echocardiogram. Both procedures requiring higher level of care, plan to transfer to [facility] intensive care and cardiology services.

## 2023-11-14 NOTE — ASSESSMENT & PLAN NOTE
This patient does have evidence of infective focus  My overall impression is sepsis.  Source: Respiratory and bacteremia  Antibiotics given-   Antibiotics (72h ago, onward)    Start     Stop Route Frequency Ordered    11/14/23 1100  mupirocin 2 % ointment         11/19/23 0859 Nasl 2 times daily 11/14/23 0952    11/14/23 0900  cefTRIAXone (ROCEPHIN) 2 g in dextrose 5 % in water (D5W) 100 mL IVPB (MB+)         -- IV Every 24 hours (non-standard times) 11/13/23 1119        Latest lactate reviewed-  Recent Labs   Lab 11/13/23  0620 11/13/23  1621 11/13/23 2012   LACTATE 6.9* 3.7* 4.6*       Organ dysfunction indicated by Acute kidney injury, Acute respiratory failure, Acute heart failure, and Encephalopathy    Fluid challenge Ideal Body Weight- The patient's ideal body weight is Ideal body weight: 82.2 kg (181 lb 3.5 oz) which will be used to calculate fluid bolus of 30 ml/kg for treatment of septic shock.      Post- resuscitation assessment Yes Perfusion exam was performed within 6 hours of septic shock presentation after bolus shows Adequate tissue perfusion assessed by non-invasive monitoring     Will Not start Pressors- Levophed for MAP of 65  Source control achieved by: IVF boluses and IV antibiotics

## 2023-11-14 NOTE — PROGRESS NOTES
[facility] Medicine  Progress Note    Patient Name: Chapo Rosario  MRN: [id number]  Patient Class: IP- Inpatient   Admission Date: 11/12/2023  Length of Stay: 2 days  Attending Physician: Joon Romero DO  Primary Care Provider: Flynn Delarosa MD        Subjective:     Principal Problem:Bacteremia  Acute Condition: new onset atrial fibrillation with RVR    HPI:  Chief Complaint   Patient presents with    Altered Mental Status       AMS status onset this am and fever since 3 pm yesterday. Tylenol given approximately 1 hr PTA. Recent cellulitis.      63-year-old male with a history of hypertension, aortic aneurysm, mechanical heart valve on Coumadin presents the emergency department with fever since yesterday with altered mental status, given Tylenol this morning, unknown mg.  He presents tachypneic, oxygen sat 85% on room air, oral temperature of 102.5° F, tachycardic.  No known exposure to COVID.  He does answer some questions, but appears somewhat lethargic.  Denies chest pain. Family states he is having some nausea vomiting and diarrhea as well.  History gathered from family members.    ED course: Vital signs at time of arrival temperature 102.5F, , RR 28, /57 mmHg, SpO2 85% on room air. Sepsis work up, lactate 8.3, WBC 14.37, Hg 14.4, HCT 40.1, . Serum Na 139, K 3.9, Cl 110, CO2 19, Glu 179, BUN/Cr 30/2 (baseline Cr 0.91), Ca 8.3, Alb 3.4, Tbili 3, AST 93, ALT 47. Cardiac work up with NT proBNP 4573, EKG tracings sinus tachycardia, no ST-T wave elevation. Procalcitonin 32.56.   Patient started on broad spectrum antibiotics and received sepsis IVF bolus.   Patient's mental status has improved since his fever has come down answering questions appropriately per ER Attending's notes.     Patient admitted to medicine service for continued treatment of sepsis with LEEANN secondary to bacteremia and pneumonia. Patient negative for COVID19, influenza, RSV. Blood cx x2 PCR  positive for streptococcus spp. Continue with respiratory support with BiPAP continuous.       Overview/Hospital Course:  11/14 Overnight tachycardia 140-160s, given IV metoprolol 5 mg x3 with no response, follow up IV cardizem 30 mg given, but rate continues to fluctuate. EKG findings of new onset atrial fibrillation RVR. Denies chest pain, palpitations. Continuous BiPAP, SpO2 >96% on FiO2 30%. Tmax overnight 99.5F. For better rate control will start cardizem gtt. Follow up cardiology consult for arrhythmia and bacteremia in setting of mechanical heart valve. Continue with IV antibiotics and respiratory care.        Interval History: Patient seen and examined.     Review of Systems   Constitutional:  Positive for activity change, appetite change and fatigue. Negative for chills and fever.   HENT:  Negative for sore throat.    Respiratory:  Positive for shortness of breath. Negative for cough, chest tightness and wheezing.    Cardiovascular:  Negative for chest pain, palpitations and leg swelling.   Gastrointestinal:  Negative for abdominal distention, abdominal pain, blood in stool, constipation, diarrhea, nausea and vomiting.   Musculoskeletal:  Negative for back pain, joint swelling, neck pain and neck stiffness.   Skin:  Negative for pallor and wound.   Neurological:  Positive for weakness. Negative for dizziness, seizures, syncope, speech difficulty, light-headedness and numbness.   Psychiatric/Behavioral:  Negative for agitation, behavioral problems, dysphoric mood and sleep disturbance. The patient is not nervous/anxious and is not hyperactive.      Objective:     Vital Signs (Most Recent):  Temp: 98 °F (36.7 °C) (11/14/23 0945)  Pulse: 94 (11/14/23 1201)  Resp: 20 (11/14/23 1201)  BP: 114/75 (11/14/23 1201)  SpO2: 96 % (11/14/23 1201) Vital Signs (24h Range):  Temp:  [97.7 °F (36.5 °C)-99.5 °F (37.5 °C)] 98 °F (36.7 °C)  Pulse:  [] 94  Resp:  [19-33] 20  SpO2:  [93 %-97 %] 96 %  BP: (102-131)/(58-81)  "114/75     Weight: (!) 137.6 kg (303 lb 6.4 oz)  Body mass index is 38.95 kg/m².    Intake/Output Summary (Last 24 hours) at 11/14/2023 1245  Last data filed at 11/14/2023 1030  Gross per 24 hour   Intake 4920 ml   Output 1800 ml   Net 3120 ml         Physical Exam  Vitals and nursing note reviewed. Exam conducted with a chaperone present.   Constitutional:       General: He is not in acute distress.     Appearance: He is obese. He is ill-appearing. He is not toxic-appearing or diaphoretic.   HENT:      Nose: No congestion or rhinorrhea.      Mouth/Throat:      Mouth: Mucous membranes are dry.      Pharynx: Oropharynx is clear. No oropharyngeal exudate or posterior oropharyngeal erythema.   Eyes:      Extraocular Movements: Extraocular movements intact.      Conjunctiva/sclera: Conjunctivae normal.   Cardiovascular:      Rate and Rhythm: Regular rhythm. Tachycardia present.   Pulmonary:      Breath sounds: No wheezing or rhonchi.      Comments: BiPAP FiO2 30%  Chest:      Chest wall: No tenderness.   Abdominal:      Tenderness: There is no right CVA tenderness or left CVA tenderness.      Comments: protuberant   Musculoskeletal:         General: No swelling.      Cervical back: Normal range of motion and neck supple. No rigidity or tenderness.      Right lower leg: No edema (trace bilaterally).      Left lower leg: Edema present.   Lymphadenopathy:      Cervical: No cervical adenopathy.   Skin:     General: Skin is warm and dry.      Capillary Refill: Capillary refill takes less than 2 seconds.   Neurological:      General: No focal deficit present.      Mental Status: He is alert and oriented to person, place, and time. Mental status is at baseline.      Cranial Nerves: No cranial nerve deficit.   Psychiatric:         Mood and Affect: Mood normal.         Behavior: Behavior normal.             Significant Labs: All pertinent labs within the past 24 hours have been reviewed.  A1C: No results for input(s): "HGBA1C" " "in the last 4320 hours.  ABGs: No results for input(s): "PH", "PCO2", "HCO3", "POCSATURATED", "BE", "TOTALHB", "COHB", "METHB", "O2HB", "POCFIO2", "PO2" in the last 48 hours.  Bilirubin:   Recent Labs   Lab 11/12/23  0837 11/13/23  0526 11/14/23  0505   BILITOT 3.0* 4.3* 3.5*     Blood Culture: No results for input(s): "LABBLOO" in the last 48 hours.  BMP:   Recent Labs   Lab 11/14/23  0505   *      K 3.6      CO2 25   BUN 47*   CREATININE 1.5*   CALCIUM 8.2*     CBC:   Recent Labs   Lab 11/13/23  0527 11/14/23  0505   WBC 13.21* 8.37   HGB 13.6* 11.9*   HCT 39.2* 33.9*   PLT 88* 73*     CMP:   Recent Labs   Lab 11/13/23  0526 11/14/23  0505    140   K 4.2 3.6   * 110   CO2 17* 25   * 230*   BUN 48* 47*   CREATININE 2.1* 1.5*   CALCIUM 7.9* 8.2*   PROT 7.3 6.8   ALBUMIN 3.3* 3.0*   BILITOT 4.3* 3.5*   ALKPHOS 34* 47*   * 89*   ALT 72* 62*   ANIONGAP 12* 5     Coagulation:   Recent Labs   Lab 11/14/23  0505   INR 2.6*     Lactic Acid:   Recent Labs   Lab 11/13/23  0620 11/13/23  1621 11/13/23 2012   LACTATE 6.9* 3.7* 4.6*   X-Ray Chest AP Portable  Narrative: EXAMINATION:  XR CHEST AP PORTABLE    CLINICAL HISTORY:  Sepsis;    TECHNIQUE:  portable chest x-ray    COMPARISON:  10/31/2019    FINDINGS:  Cardiomegaly and prior heart surgery.  There is suggestion of some patchy infiltrate or edema greater on the left  Impression: Cardiomegaly and prior heart surgery with suggestion of mild interstitial infiltrate or edema slightly greater on the left    Electronically signed by: Nury Varags MD  Date:    11/12/2023  Time:    11:31     Significant Imaging: I have reviewed all pertinent imaging results/findings within the past 24 hours.    Assessment/Plan:      * Bacteremia  11/12 Blood Cx x2 growing strep spp. identified via PCR. Patient in 2018 hx of group B strep bacteremia.   Initial vanc zosyn x1 will change to rocephin per sensitivity and susceptibility study.   11/14 " continue with Rocephin, sensitive to Group G strep  - continue supportive care  - f/u echocardiogram          New onset a-fib  Patient with Paroxysmal (<7 days) atrial fibrillation which is uncontrolled currently with Calcium Channel Blocker. Patient is currently in atrial fibrillation. Anticoagulation indicated. Anticoagulation done with warfarin in therapeutic range  .  - continue cardizem gtt per protocol  - f/u cardiology consult      Thrombocytopenia  Patient was found to have thrombocytopenia, the likely etiology is secondary to sepsis/infection, will monitor the platelets Daily. Will transfuse if platelet count is <20k. Hold DVT prophylaxis if platelets are <50k. The patient's platelet results have been reviewed and are listed below.  Recent Labs   Lab 11/14/23  0505   PLT 73*     Continue to monitor.       Elevated brain natriuretic peptide (BNP) level  Associated tachycardia. Likely reactive to current infective focus with growth of strep spp from blood cx samples.   - f/u echocardiogram  - f/u cardiology consult      Encephalopathy, metabolic  Per ER note, patient more alert and appropriately responding to questions after IVF, antipyretics and control of elevated temperature. Continue to monitor.   11/14 Back to baseline, endorsing feeling generalized weakness      LEEANN (acute kidney injury)  Patient with acute kidney injury/acute renal failure likely due to pre-renal azotemia due to dehydration LEEANN is currently stable. Baseline creatinine  0.91  - Labs reviewed- Renal function/electrolytes with Estimated Creatinine Clearance: 76.4 mL/min (A) (based on SCr of 1.5 mg/dL (H)). according to latest data. Monitor urine output and serial BMP and adjust therapy as needed. Avoid nephrotoxins and renally dose meds for GFR listed above.    11/14 improving, continue to trend renal function    Fever  See above. No elevated temperature over the past 24 hours.     Severe sepsis  This patient does have evidence of  infective focus  My overall impression is sepsis.  Source: Respiratory and bacteremia  Antibiotics given-   Antibiotics (72h ago, onward)      Start     Stop Route Frequency Ordered    11/14/23 1100  mupirocin 2 % ointment         11/19/23 0859 Nasl 2 times daily 11/14/23 0952    11/14/23 0900  cefTRIAXone (ROCEPHIN) 2 g in dextrose 5 % in water (D5W) 100 mL IVPB (MB+)         -- IV Every 24 hours (non-standard times) 11/13/23 1119          Latest lactate reviewed-  Recent Labs   Lab 11/13/23  0620 11/13/23  1621 11/13/23 2012   LACTATE 6.9* 3.7* 4.6*       Organ dysfunction indicated by Acute kidney injury, Acute respiratory failure, Acute heart failure, and Encephalopathy    Fluid challenge Ideal Body Weight- The patient's ideal body weight is Ideal body weight: 82.2 kg (181 lb 3.5 oz) which will be used to calculate fluid bolus of 30 ml/kg for treatment of septic shock.      Post- resuscitation assessment Yes Perfusion exam was performed within 6 hours of septic shock presentation after bolus shows Adequate tissue perfusion assessed by non-invasive monitoring     Will Not start Pressors- Levophed for MAP of 65  Source control achieved by: IVF boluses and IV antibiotics    Mechanical heart valve present  Lab Results   Component Value Date    INR 2.6 (H) 11/14/2023    INR 2.8 (H) 11/13/2023    INR 3.0 (H) 11/12/2023   Home warfarin.         VTE Risk Mitigation (From admission, onward)           Ordered     warfarin split tablet 4 mg  Every Tues, Thurs        See Hyperspace for full Linked Orders Report.    11/12/23 1433     warfarin split tablet 8 mg  Once per day on Sun Mon Wed Fri Sat        See Hyperspace for full Linked Orders Report.    11/12/23 1433     IP VTE HIGH RISK PATIENT  Once         11/12/23 1312     Place sequential compression device  Until discontinued         11/12/23 1312     Place AINSLEY hose  Until discontinued         11/12/23 1312                    Discharge Planning   [place]:      Code Status:  Full Code   Is the patient medically ready for discharge?:     Reason for patient still in hospital (select all that apply): Patient new problem, Patient trending condition, Laboratory test, Treatment, Imaging, and Consult recommendations  Discharge Plan A: Home                  Joon Romero DO  Department of [facility] Medicine   [facility]

## 2023-11-14 NOTE — ASSESSMENT & PLAN NOTE
Lab Results   Component Value Date    INR 2.6 (H) 11/14/2023    INR 2.8 (H) 11/13/2023    INR 3.0 (H) 11/12/2023   Home warfarin.

## 2023-11-14 NOTE — NURSING
Spoke with Dr. Romero over the phone and informed her that [facility] seen patient. Dr. Romero is placing transfer order in to transfer patient to the ICU.     @0907: Report called to JOSEPH Cotter in the ICU.  Patient transferred to the ICU.

## 2023-11-14 NOTE — ASSESSMENT & PLAN NOTE
Patient with acute kidney injury/acute renal failure likely due to pre-renal azotemia due to dehydration LEEANN is currently stable. Baseline creatinine  0.91  - Labs reviewed- Renal function/electrolytes with Estimated Creatinine Clearance: 76.4 mL/min (A) (based on SCr of 1.5 mg/dL (H)). according to latest data. Monitor urine output and serial BMP and adjust therapy as needed. Avoid nephrotoxins and renally dose meds for GFR listed above.    11/14 improving, continue to trend renal function

## 2023-11-14 NOTE — SUBJECTIVE & OBJECTIVE
Interval History: Patient seen and examined.     Review of Systems   Constitutional:  Positive for activity change, appetite change and fatigue. Negative for chills and fever.   HENT:  Negative for sore throat.    Respiratory:  Positive for shortness of breath. Negative for cough, chest tightness and wheezing.    Cardiovascular:  Negative for chest pain, palpitations and leg swelling.   Gastrointestinal:  Negative for abdominal distention, abdominal pain, blood in stool, constipation, diarrhea, nausea and vomiting.   Musculoskeletal:  Negative for back pain, joint swelling, neck pain and neck stiffness.   Skin:  Negative for pallor and wound.   Neurological:  Positive for weakness. Negative for dizziness, seizures, syncope, speech difficulty, light-headedness and numbness.   Psychiatric/Behavioral:  Negative for agitation, behavioral problems, dysphoric mood and sleep disturbance. The patient is not nervous/anxious and is not hyperactive.      Objective:     Vital Signs (Most Recent):  Temp: 98 °F (36.7 °C) (11/14/23 0945)  Pulse: 94 (11/14/23 1201)  Resp: 20 (11/14/23 1201)  BP: 114/75 (11/14/23 1201)  SpO2: 96 % (11/14/23 1201) Vital Signs (24h Range):  Temp:  [97.7 °F (36.5 °C)-99.5 °F (37.5 °C)] 98 °F (36.7 °C)  Pulse:  [] 94  Resp:  [19-33] 20  SpO2:  [93 %-97 %] 96 %  BP: (102-131)/(58-81) 114/75     Weight: (!) 137.6 kg (303 lb 6.4 oz)  Body mass index is 38.95 kg/m².    Intake/Output Summary (Last 24 hours) at 11/14/2023 1245  Last data filed at 11/14/2023 1030  Gross per 24 hour   Intake 4920 ml   Output 1800 ml   Net 3120 ml         Physical Exam  Vitals and nursing note reviewed. Exam conducted with a chaperone present.   Constitutional:       General: He is not in acute distress.     Appearance: He is obese. He is ill-appearing. He is not toxic-appearing or diaphoretic.   HENT:      Nose: No congestion or rhinorrhea.      Mouth/Throat:      Mouth: Mucous membranes are dry.      Pharynx: Oropharynx  "is clear. No oropharyngeal exudate or posterior oropharyngeal erythema.   Eyes:      Extraocular Movements: Extraocular movements intact.      Conjunctiva/sclera: Conjunctivae normal.   Cardiovascular:      Rate and Rhythm: Regular rhythm. Tachycardia present.   Pulmonary:      Breath sounds: No wheezing or rhonchi.      Comments: BiPAP FiO2 30%  Chest:      Chest wall: No tenderness.   Abdominal:      Tenderness: There is no right CVA tenderness or left CVA tenderness.      Comments: protuberant   Musculoskeletal:         General: No swelling.      Cervical back: Normal range of motion and neck supple. No rigidity or tenderness.      Right lower leg: No edema (trace bilaterally).      Left lower leg: Edema present.   Lymphadenopathy:      Cervical: No cervical adenopathy.   Skin:     General: Skin is warm and dry.      Capillary Refill: Capillary refill takes less than 2 seconds.   Neurological:      General: No focal deficit present.      Mental Status: He is alert and oriented to person, place, and time. Mental status is at baseline.      Cranial Nerves: No cranial nerve deficit.   Psychiatric:         Mood and Affect: Mood normal.         Behavior: Behavior normal.             Significant Labs: All pertinent labs within the past 24 hours have been reviewed.  A1C: No results for input(s): "HGBA1C" in the last 4320 hours.  ABGs: No results for input(s): "PH", "PCO2", "HCO3", "POCSATURATED", "BE", "TOTALHB", "COHB", "METHB", "O2HB", "POCFIO2", "PO2" in the last 48 hours.  Bilirubin:   Recent Labs   Lab 11/12/23  0837 11/13/23  0526 11/14/23  0505   BILITOT 3.0* 4.3* 3.5*     Blood Culture: No results for input(s): "LABBLOO" in the last 48 hours.  BMP:   Recent Labs   Lab 11/14/23  0505   *      K 3.6      CO2 25   BUN 47*   CREATININE 1.5*   CALCIUM 8.2*     CBC:   Recent Labs   Lab 11/13/23  0527 11/14/23  0505   WBC 13.21* 8.37   HGB 13.6* 11.9*   HCT 39.2* 33.9*   PLT 88* 73*     CMP:   Recent " Labs   Lab 11/13/23  0526 11/14/23  0505    140   K 4.2 3.6   * 110   CO2 17* 25   * 230*   BUN 48* 47*   CREATININE 2.1* 1.5*   CALCIUM 7.9* 8.2*   PROT 7.3 6.8   ALBUMIN 3.3* 3.0*   BILITOT 4.3* 3.5*   ALKPHOS 34* 47*   * 89*   ALT 72* 62*   ANIONGAP 12* 5     Coagulation:   Recent Labs   Lab 11/14/23  0505   INR 2.6*     Lactic Acid:   Recent Labs   Lab 11/13/23  0620 11/13/23  1621 11/13/23 2012   LACTATE 6.9* 3.7* 4.6*   X-Ray Chest AP Portable  Narrative: EXAMINATION:  XR CHEST AP PORTABLE    CLINICAL HISTORY:  Sepsis;    TECHNIQUE:  portable chest x-ray    COMPARISON:  10/31/2019    FINDINGS:  Cardiomegaly and prior heart surgery.  There is suggestion of some patchy infiltrate or edema greater on the left  Impression: Cardiomegaly and prior heart surgery with suggestion of mild interstitial infiltrate or edema slightly greater on the left    Electronically signed by: Nury Vargas MD  Date:    11/12/2023  Time:    11:31     Significant Imaging: I have reviewed all pertinent imaging results/findings within the past 24 hours.

## 2023-11-14 NOTE — CONSULTS
[facility]  Cardiology  Consult Note    Patient Name: Chapo Rosario  MRN: [id number]  Admission Date: 11/12/2023  Hospital Length of Stay: 2 days  Code Status: Full Code   Attending Provider: Joon Romero DO   Consulting Provider: Antonio Ortiz MD  Primary Care Physician: Flynn Delarosa MD  Principal Problem:Bacteremia    Patient information was obtained from patient, past medical records, and ER records.     Patient seen today via telemed.    Inpatient consult to Cardiology  Consult performed by: Ashley Herrera PA-C  Consult ordered by: Joon Romero DO        Subjective:     Chief Complaint:  Fever, altered mental status     HPI:   This is a 63-year-old male who presented to the emergency department with a 3 day history of fever and altered mental status.  It was noted he had a recent cellulitis infection.  He was tachypneic, tachycardic and febrile upon arrival.  He was started on broad-spectrum IV antibiotics and continuous BiPAP.  His labs were notable for elevated pro BNP 4573, PT INR 23/2.6, positive blood cultures for Streptococcus species.    Patient follows with Dr. Ortiz.  He has cardiac history of s/p mechanical aortic valve replacement, ascending aorta tube graft, hypertension, hyperlipidemia, and a history of group B Streptococcus sepsis.  He was last seen in clinic September 2023 and was noted to be doing well at that time.    He was admitted to internal medicine with sepsis/bacteremia, and cis has been consulted.  Overnight, he remains tachycardic with a max heart rate of 187, average 160s.  He received 50 total mg of IV diltiazem as well as diltiazem p.o. 30 mg every 6 hours.  He also received a total of 15 mg IV Lopressor.  His heart rate has continued to be elevated, currently 120s to 140s.  He is seen on continuous BiPAP in moderate distress, and can not answer questions thoroughly.  Majority of history is obtained from family at bedside and past medical  records.    EKG this morning reveals atrial fibrillation with rapid ventricular response, this is a new diagnosis of patient has no history of AFib.  He is currently on warfarin for anticoagulation after mechanical valve replacement.  On exam today, heart rate is irregular and tachycardic.  He does also have 3+ swelling to bilateral lower extremities.  He received 1 dose of 40 mg IV Lasix.  He has an echocardiogram ordered pending results.    Past Medical History:   Diagnosis Date    Aortic aneurysm     Hypertension        Past Surgical History:   Procedure Laterality Date    APPENDECTOMY      CARDIAC SURGERY  2013    CHOLECYSTECTOMY      NECK SURGERY         Review of patient's allergies indicates:  No Known Allergies    No current facility-administered medications on file prior to encounter.     Current Outpatient Medications on File Prior to Encounter   Medication Sig    amLODIPine (NORVASC) 5 MG tablet Take 5 mg by mouth once daily.    aspirin (ECOTRIN) 81 MG EC tablet Take 81 mg by mouth once daily.    docusate sodium (COLACE) 100 MG capsule Take 100 mg by mouth 2 (two) times daily.    fenofibric acid (FIBRICOR) 135 mg CpDR Take 135 mg by mouth once daily.    tadalafiL (CIALIS) 5 MG tablet Take 5 mg by mouth every evening.    vitamin D 1000 units Tab Take 5,000 Units by mouth once daily.    warfarin (COUMADIN) 6 MG tablet Take 4 mg by mouth Daily. 4 mg every Tuesday and Thursday. 8 mg on Sunday, Monday, Wednesday, Friday, and Saturday     Family History    None       Tobacco Use    Smoking status: Former    Smokeless tobacco: Not on file   Substance and Sexual Activity    Alcohol use: Not on file    Drug use: Not on file    Sexual activity: Not on file     Review of Systems   Unable to perform ROS: Acuity of condition     Objective:     Vital Signs (Most Recent):  Temp: 98 °F (36.7 °C) (11/14/23 0945)  Pulse: 94 (11/14/23 1201)  Resp: 20 (11/14/23 1201)  BP: 114/75 (11/14/23 1201)  SpO2: 96 % (11/14/23 1201)  Vital Signs (24h Range):  Temp:  [97.7 °F (36.5 °C)-99.5 °F (37.5 °C)] 98 °F (36.7 °C)  Pulse:  [] 94  Resp:  [19-33] 20  SpO2:  [93 %-97 %] 96 %  BP: (102-131)/(58-81) 114/75     Weight: (!) 137.6 kg (303 lb 6.4 oz)  Body mass index is 38.95 kg/m².    SpO2: 96 %         Intake/Output Summary (Last 24 hours) at 11/14/2023 1418  Last data filed at 11/14/2023 1030  Gross per 24 hour   Intake 4920 ml   Output 1800 ml   Net 3120 ml       Lines/Drains/Airways       Drain  Duration             Male External Urinary Catheter 11/14/23 0930 Other (Comment) <1 day              Peripheral Intravenous Line  Duration                  Peripheral IV - Single Lumen 11/12/23 0823 18 G Right Antecubital 2 days         Peripheral IV - Single Lumen 11/14/23 1030 18 G Left Antecubital <1 day                    Physical Exam  Vitals and nursing note reviewed.   Constitutional:       General: He is in acute distress.   HENT:      Head: Normocephalic and atraumatic.   Eyes:      Extraocular Movements: Extraocular movements intact.   Cardiovascular:      Rate and Rhythm: Tachycardia present. Rhythm irregular.      Heart sounds: No murmur heard.     No friction rub. No gallop.   Pulmonary:      Comments: On continuous BiPAP  Abdominal:      General: There is distension.   Musculoskeletal:      Right lower leg: Edema present.      Left lower leg: Edema present.   Skin:     General: Skin is warm.      Capillary Refill: Capillary refill takes less than 2 seconds.   Neurological:      General: No focal deficit present.      Comments: Somnolent, but arousable         Significant Labs: BMP:   Recent Labs   Lab 11/13/23  0526 11/14/23  0505   * 230*    140   K 4.2 3.6   * 110   CO2 17* 25   BUN 48* 47*   CREATININE 2.1* 1.5*   CALCIUM 7.9* 8.2*   , CMP   Recent Labs   Lab 11/13/23  0526 11/14/23  0505    140   K 4.2 3.6   * 110   CO2 17* 25   * 230*   BUN 48* 47*   CREATININE 2.1* 1.5*   CALCIUM 7.9* 8.2*  "  PROT 7.3 6.8   ALBUMIN 3.3* 3.0*   BILITOT 4.3* 3.5*   ALKPHOS 34* 47*   * 89*   ALT 72* 62*   ANIONGAP 12* 5   , CBC   Recent Labs   Lab 11/13/23  0527 11/14/23  0505   WBC 13.21* 8.37   HGB 13.6* 11.9*   HCT 39.2* 33.9*   PLT 88* 73*   , Troponin No results for input(s): "TROPONINI" in the last 48 hours., and All pertinent lab results from the last 24 hours have been reviewed.    Significant Imaging:   Echocardiogram 04/03/2023:  The study quality is below average.   The left ventricle is mildly enlarged. Left ventricular diastolic dimension is 6.2 cms. The left ventricular ejection fraction is 55-60%. Global left ventricular systolic function is normal. Left ventricular diastolic function is normal. Noted left ventricular hypertrophy. It is mild.   The left atrial diameter is mildly increased. Left atrial diameter is 4.3 cms. The right atrium is mildly enlarged. The aortic root appears mildly dilated. Aortic root diameter is 4.0 cms.  Trace tricuspid regurgitation. Trace mitral regurgitation.  The pulmonary artery systolic pressure is 19 mmHg.   The prosthetic valve in the aortic position functions normally. A metallic valve is noted at the level of the aortic valve. Aortic valve area continuity equation is 1.7 cm². The trans-aortic peak velocity is 2.2 m/s. The trans-aortic peak gradient is 19.9 mmHg. The trans-aortic mean gradient is 11.0 mmHg. LVOT Diameter is 1.9 cms.     Exercise treadmill test 04/12/2023:  Stress EKG is normal.   Maximal exercise treadmill test (MPHR : 105 %).  The functional capacity is good (10.3 METs).  The heart rate recovery is normal.   The study quality is good.     Assessment and Plan:    New onset atrial fibrillation, rapid ventricular response  -EKG 12 lead revealing atrial fibrillation with   -received a total of 50 mg IV diltiazem, currently p.o. diltiazem 30 mg q6h  -started on IV diltiazem, rate improved; however, HR increased again so he will be started on " amiodarone IV  -currently on warfarin for history of mechanical, valve PT/INR within range 23/2.6 thigh    Elevated BNP   -pro BNP 4573  -appears volume overloaded on exam with dyspnea, shortness of breath, 3+ pitting edema bilaterally  -received 40 mg IV Lasix x1  -BUN/Cr 47/1.5 elevated from baseline  -echocardiogram pending to reassess cardiac function    Acute respiratory distress  -currently on continuous BiPAP  -appears in moderate distress  -O2, 95%  -primary with plans to transfer to ICU  -continue management per primary    Sepsis  Bacteremia  LEEANN  -febrile, tachycardic, tachypneic with elevated lactate on presentation  -blood culture result Streptococcus group G, IV antibiotic regimen transitioned to ceftriaxone  -currently afebrile, remains tachycardic and tachypneic  -continue with the primary    S/p mechanical aortic valve replacement  -stable, continue warfarin  -PT/INR 23/2.6    Hypertension   -blood pressures soft, systolics 110s  -hold home meds, resume as needed  -continue to monitor blood pressure    Hyperlipidemia     S/P ascending aorta tube graft    H/o group B Streptococcus sepsis     Active Diagnoses:    Diagnosis Date Noted POA    PRINCIPAL PROBLEM:  Bacteremia [R78.81] 11/13/2023 Yes    New onset a-fib [I48.91] 11/14/2023 No    Fever [R50.9] 11/13/2023 Yes    LEEANN (acute kidney injury) [N17.9] 11/13/2023 Yes    Encephalopathy, metabolic [G93.41] 11/13/2023 Yes    Elevated brain natriuretic peptide (BNP) level [R79.89] 11/13/2023 Yes    Thrombocytopenia [D69.6] 11/13/2023 Yes    Severe sepsis [A41.9, R65.20] 08/30/2018 Yes    Mechanical heart valve present [Z95.2] 08/30/2018 Not Applicable      Problems Resolved During this Admission:       VTE Risk Mitigation (From admission, onward)           Ordered     warfarin split tablet 4 mg  Every Tues, Thurs        See Hyperspace for full Linked Orders Report.    11/12/23 1433     warfarin split tablet 8 mg  Once per day on Sun Mon Wed Fri Sat         See Hyperspace for full Linked Orders Report.    11/12/23 1433     IP VTE HIGH RISK PATIENT  Once         11/12/23 1312     Place sequential compression device  Until discontinued         11/12/23 1312     Place AINSLEY hose  Until discontinued         11/12/23 1312                    Thank you for your consult. I will follow-up with patient. Please contact us if you have any additional questions.    Ashley Herrera PA-C  Cardiology     I have personally interviewed and examined this patient face-to-face. As the physician, I have documented the above plan and rendered all medical decision making for this encounter. I have read and agree with the above documentation unless otherwise noted.     Antonio Ortiz MD  Cardiology   [facility]

## 2023-11-15 LAB
ALBUMIN SERPL BCP-MCNC: 2.9 G/DL (ref 3.5–5.2)
ALP SERPL-CCNC: 67 U/L (ref 55–135)
ALT SERPL W/O P-5'-P-CCNC: 55 U/L (ref 10–44)
ANION GAP SERPL CALC-SCNC: 4 MMOL/L (ref 3–11)
AST SERPL-CCNC: 61 U/L (ref 10–40)
AV INDEX (PROSTH): 0.59
AV MEAN GRADIENT: 8 MMHG
AV PEAK GRADIENT: 16 MMHG
AV VALVE AREA BY VELOCITY RATIO: 1.61 CM²
AV VALVE AREA: 1.89 CM²
AV VELOCITY RATIO: 0.5
BASOPHILS # BLD AUTO: 0.03 K/UL (ref 0–0.2)
BASOPHILS NFR BLD: 0.3 % (ref 0–1.9)
BILIRUB SERPL-MCNC: 2.2 MG/DL (ref 0.1–1)
BSA FOR ECHO PROCEDURE: 2.68 M2
BUN SERPL-MCNC: 35 MG/DL (ref 8–23)
CALCIUM SERPL-MCNC: 8.2 MG/DL (ref 8.7–10.5)
CHLORIDE SERPL-SCNC: 110 MMOL/L (ref 95–110)
CO2 SERPL-SCNC: 27 MMOL/L (ref 23–29)
CREAT SERPL-MCNC: 1.1 MG/DL (ref 0.5–1.4)
CV ECHO LV RWT: 0.25 CM
DIFFERENTIAL METHOD: ABNORMAL
DOP CALC AO PEAK VEL: 1.97 M/S
DOP CALC AO VTI: 30 CM
DOP CALC LVOT AREA: 3.2 CM2
DOP CALC LVOT DIAMETER: 2.02 CM
DOP CALC LVOT PEAK VEL: 0.99 M/S
DOP CALC LVOT STROKE VOLUME: 56.7 CM3
DOP CALCLVOT PEAK VEL VTI: 17.7 CM
E WAVE DECELERATION TIME: 181.51 MSEC
E/A RATIO: 109
E/E' RATIO: 9.91 M/S
ECHO LV POSTERIOR WALL: 0.85 CM (ref 0.6–1.1)
EOSINOPHIL # BLD AUTO: 0 K/UL (ref 0–0.5)
EOSINOPHIL NFR BLD: 0 % (ref 0–8)
ERYTHROCYTE [DISTWIDTH] IN BLOOD BY AUTOMATED COUNT: 14.7 % (ref 11.5–14.5)
EST. GFR  (NO RACE VARIABLE): >60 ML/MIN/1.73 M^2
FRACTIONAL SHORTENING: 49 % (ref 28–44)
GLUCOSE SERPL-MCNC: 198 MG/DL (ref 70–110)
HCT VFR BLD AUTO: 33.8 % (ref 40–54)
HGB BLD-MCNC: 11.7 G/DL (ref 14–18)
IMM GRANULOCYTES # BLD AUTO: 0.06 K/UL (ref 0–0.04)
IMM GRANULOCYTES NFR BLD AUTO: 0.6 % (ref 0–0.5)
INR PPP: 2.6 (ref 0.8–1.2)
INTERVENTRICULAR SEPTUM: 1.03 CM (ref 0.6–1.1)
IVC DIAMETER: 1.66 CM
LA MAJOR: 5.63 CM
LEFT ATRIUM SIZE: 4.14 CM
LEFT ATRIUM VOLUME INDEX MOD: 31 ML/M2
LEFT ATRIUM VOLUME MOD: 80.18 CM3
LEFT INTERNAL DIMENSION IN SYSTOLE: 3.51 CM (ref 2.1–4)
LEFT VENTRICLE DIASTOLIC VOLUME INDEX: 95.03 ML/M2
LEFT VENTRICLE DIASTOLIC VOLUME: 246.14 ML
LEFT VENTRICLE MASS INDEX: 112 G/M2
LEFT VENTRICLE SYSTOLIC VOLUME INDEX: 19.8 ML/M2
LEFT VENTRICLE SYSTOLIC VOLUME: 51.27 ML
LEFT VENTRICULAR INTERNAL DIMENSION IN DIASTOLE: 6.89 CM (ref 3.5–6)
LEFT VENTRICULAR MASS: 289.67 G
LV LATERAL E/E' RATIO: 9.91 M/S
LV SEPTAL E/E' RATIO: 9.91 M/S
LVOT MG: 2.39 MMHG
LVOT MV: 0.73 CM/S
LYMPHOCYTES # BLD AUTO: 1.9 K/UL (ref 1–4.8)
LYMPHOCYTES NFR BLD: 20.2 % (ref 18–48)
MCH RBC QN AUTO: 33.7 PG (ref 27–31)
MCHC RBC AUTO-ENTMCNC: 34.6 G/DL (ref 32–36)
MCV RBC AUTO: 97 FL (ref 82–98)
MONOCYTES # BLD AUTO: 0.7 K/UL (ref 0.3–1)
MONOCYTES NFR BLD: 7.8 % (ref 4–15)
MV PEAK A VEL: 0.01 M/S
MV PEAK E VEL: 1.09 M/S
MV STENOSIS PRESSURE HALF TIME: 52.64 MS
MV VALVE AREA P 1/2 METHOD: 4.18 CM2
NEUTROPHILS # BLD AUTO: 6.7 K/UL (ref 1.8–7.7)
NEUTROPHILS NFR BLD: 71.1 % (ref 38–73)
NRBC BLD-RTO: 0 /100 WBC
PISA TR MAX VEL: 1.75 M/S
PLATELET # BLD AUTO: 71 K/UL (ref 150–450)
PMV BLD AUTO: 11.1 FL (ref 9.2–12.9)
POTASSIUM SERPL-SCNC: 3.5 MMOL/L (ref 3.5–5.1)
PROT SERPL-MCNC: 6.5 G/DL (ref 6–8.4)
PROTHROMBIN TIME: 23.6 SEC (ref 9–12.5)
PULM VEIN S/D RATIO: 0.94
PV MV: 0.67 M/S
PV PEAK D VEL: 0.35 M/S
PV PEAK GRADIENT: 4 MMHG
PV PEAK S VEL: 0.33 M/S
PV PEAK VELOCITY: 0.97 M/S
RA MAJOR: 6.2 CM
RBC # BLD AUTO: 3.47 M/UL (ref 4.6–6.2)
RIGHT VENTRICULAR END-DIASTOLIC DIMENSION: 1.93 CM
RV TISSUE DOPPLER FREE WALL SYSTOLIC VELOCITY 1 (APICAL 4 CHAMBER VIEW): 9.77 CM/S
SODIUM SERPL-SCNC: 141 MMOL/L (ref 136–145)
TDI LATERAL: 0.11 M/S
TDI SEPTAL: 0.11 M/S
TDI: 0.11 M/S
TR MAX PG: 12 MMHG
WBC # BLD AUTO: 9.45 K/UL (ref 3.9–12.7)
Z-SCORE OF LEFT VENTRICULAR DIMENSION IN END DIASTOLE: -9.56
Z-SCORE OF LEFT VENTRICULAR DIMENSION IN END SYSTOLE: -8.68

## 2023-11-15 PROCEDURE — 99233 SBSQ HOSP IP/OBS HIGH 50: CPT | Mod: ,,, | Performed by: STUDENT IN AN ORGANIZED HEALTH CARE EDUCATION/TRAINING PROGRAM

## 2023-11-15 PROCEDURE — 85610 PROTHROMBIN TIME: CPT | Performed by: STUDENT IN AN ORGANIZED HEALTH CARE EDUCATION/TRAINING PROGRAM

## 2023-11-15 PROCEDURE — 94799 UNLISTED PULMONARY SVC/PX: CPT

## 2023-11-15 PROCEDURE — 20000000 HC ICU ROOM

## 2023-11-15 PROCEDURE — 25000003 PHARM REV CODE 250: Performed by: STUDENT IN AN ORGANIZED HEALTH CARE EDUCATION/TRAINING PROGRAM

## 2023-11-15 PROCEDURE — 99233 PR SUBSEQUENT HOSPITAL CARE,LEVL III: ICD-10-PCS | Mod: ,,, | Performed by: STUDENT IN AN ORGANIZED HEALTH CARE EDUCATION/TRAINING PROGRAM

## 2023-11-15 PROCEDURE — 63600175 PHARM REV CODE 636 W HCPCS

## 2023-11-15 PROCEDURE — 63600175 PHARM REV CODE 636 W HCPCS: Performed by: INTERNAL MEDICINE

## 2023-11-15 PROCEDURE — 97163 PT EVAL HIGH COMPLEX 45 MIN: CPT

## 2023-11-15 PROCEDURE — 80053 COMPREHEN METABOLIC PANEL: CPT | Performed by: STUDENT IN AN ORGANIZED HEALTH CARE EDUCATION/TRAINING PROGRAM

## 2023-11-15 PROCEDURE — 63600175 PHARM REV CODE 636 W HCPCS: Performed by: STUDENT IN AN ORGANIZED HEALTH CARE EDUCATION/TRAINING PROGRAM

## 2023-11-15 PROCEDURE — 99900031 HC PATIENT EDUCATION (STAT)

## 2023-11-15 PROCEDURE — 94761 N-INVAS EAR/PLS OXIMETRY MLT: CPT

## 2023-11-15 PROCEDURE — 25000003 PHARM REV CODE 250

## 2023-11-15 PROCEDURE — A4216 STERILE WATER/SALINE, 10 ML: HCPCS | Performed by: STUDENT IN AN ORGANIZED HEALTH CARE EDUCATION/TRAINING PROGRAM

## 2023-11-15 PROCEDURE — 36415 COLL VENOUS BLD VENIPUNCTURE: CPT | Performed by: STUDENT IN AN ORGANIZED HEALTH CARE EDUCATION/TRAINING PROGRAM

## 2023-11-15 PROCEDURE — 94660 CPAP INITIATION&MGMT: CPT

## 2023-11-15 PROCEDURE — 85025 COMPLETE CBC W/AUTO DIFF WBC: CPT | Performed by: STUDENT IN AN ORGANIZED HEALTH CARE EDUCATION/TRAINING PROGRAM

## 2023-11-15 PROCEDURE — 99900035 HC TECH TIME PER 15 MIN (STAT)

## 2023-11-15 PROCEDURE — 27000221 HC OXYGEN, UP TO 24 HOURS

## 2023-11-15 RX ORDER — AMIODARONE HYDROCHLORIDE 200 MG/1
200 TABLET ORAL 2 TIMES DAILY
Status: DISCONTINUED | OUTPATIENT
Start: 2023-11-20 | End: 2023-11-16 | Stop reason: HOSPADM

## 2023-11-15 RX ORDER — AMIODARONE HYDROCHLORIDE 200 MG/1
200 TABLET ORAL DAILY
Status: DISCONTINUED | OUTPATIENT
Start: 2023-11-27 | End: 2023-11-16 | Stop reason: HOSPADM

## 2023-11-15 RX ORDER — ACETAMINOPHEN 325 MG/1
650 TABLET ORAL EVERY 8 HOURS PRN
Status: DISCONTINUED | OUTPATIENT
Start: 2023-11-15 | End: 2023-11-16 | Stop reason: HOSPADM

## 2023-11-15 RX ORDER — FAMOTIDINE 20 MG/1
20 TABLET, FILM COATED ORAL 2 TIMES DAILY
Status: DISCONTINUED | OUTPATIENT
Start: 2023-11-15 | End: 2023-11-16 | Stop reason: HOSPADM

## 2023-11-15 RX ORDER — AMIODARONE HYDROCHLORIDE 200 MG/1
400 TABLET ORAL 2 TIMES DAILY
Status: DISCONTINUED | OUTPATIENT
Start: 2023-11-15 | End: 2023-11-16 | Stop reason: HOSPADM

## 2023-11-15 RX ADMIN — AMIODARONE HYDROCHLORIDE 0.5 MG/MIN: 1.8 INJECTION, SOLUTION INTRAVENOUS at 06:11

## 2023-11-15 RX ADMIN — SODIUM BICARBONATE 650 MG TABLET 650 MG: at 08:11

## 2023-11-15 RX ADMIN — CEFTRIAXONE 2 G: 2 INJECTION, POWDER, FOR SOLUTION INTRAMUSCULAR; INTRAVENOUS at 09:11

## 2023-11-15 RX ADMIN — SODIUM BICARBONATE 650 MG TABLET 650 MG: at 09:11

## 2023-11-15 RX ADMIN — Medication 10 ML: at 05:11

## 2023-11-15 RX ADMIN — AMIODARONE HYDROCHLORIDE 1 MG/MIN: 1.8 INJECTION, SOLUTION INTRAVENOUS at 10:11

## 2023-11-15 RX ADMIN — MUPIROCIN: 20 OINTMENT TOPICAL at 09:11

## 2023-11-15 RX ADMIN — AMIODARONE HYDROCHLORIDE 400 MG: 200 TABLET ORAL at 02:11

## 2023-11-15 RX ADMIN — DILTIAZEM HYDROCHLORIDE 30 MG: 30 TABLET, FILM COATED ORAL at 12:11

## 2023-11-15 RX ADMIN — Medication 10 ML: at 12:11

## 2023-11-15 RX ADMIN — ASPIRIN 81 MG: 81 TABLET, COATED ORAL at 09:11

## 2023-11-15 RX ADMIN — ACETAMINOPHEN 650 MG: 325 TABLET ORAL at 04:11

## 2023-11-15 RX ADMIN — AMIODARONE HYDROCHLORIDE 1 MG/MIN: 1.8 INJECTION, SOLUTION INTRAVENOUS at 05:11

## 2023-11-15 RX ADMIN — DILTIAZEM HYDROCHLORIDE 30 MG: 30 TABLET, FILM COATED ORAL at 05:11

## 2023-11-15 RX ADMIN — FAMOTIDINE 20 MG: 20 TABLET, FILM COATED ORAL at 08:11

## 2023-11-15 RX ADMIN — WARFARIN SODIUM 8 MG: 5 TABLET ORAL at 04:11

## 2023-11-15 RX ADMIN — AMIODARONE HYDROCHLORIDE 400 MG: 200 TABLET ORAL at 08:11

## 2023-11-15 RX ADMIN — FAMOTIDINE 20 MG: 20 TABLET, FILM COATED ORAL at 09:11

## 2023-11-15 NOTE — PLAN OF CARE
Patient on oxygen @ documented setting. Attempt to wean FiO2 per Oxygen titration Protocol. Patient instructed on benefits of therapy.     Patient on documented BiPAP settings, with alarms set and functioning.      Patient predicted IS volumes:   2900   Actual IS volumes:  1000  Pt instructed and encouraged to do Q2 x 10 breaths on own.

## 2023-11-15 NOTE — ASSESSMENT & PLAN NOTE
11/12 Blood Cx x2 growing strep spp. identified via PCR. Patient in 2018 hx of group B strep bacteremia.   Initial vanc zosyn x1 will change to rocephin per sensitivity and susceptibility study.   11/14 continue with Rocephin, sensitive to Group G strep  11/15 D4 IV antibiotics, continue Rocephin  - continue supportive care  - f/u echocardiogram

## 2023-11-15 NOTE — ASSESSMENT & PLAN NOTE
Per ER note, patient more alert and appropriately responding to questions after IVF, antipyretics and control of elevated temperature. Continue to monitor.   11/14 Back to baseline, endorsing feeling generalized weakness  11/15 Patient endorses small improvement

## 2023-11-15 NOTE — NURSING
RR rate decreased into lower- mid 20's since wearing bipap. No SOB reported from patient. Pt Respirations look more relaxed and pt appears more comfortable on bipap.

## 2023-11-15 NOTE — ASSESSMENT & PLAN NOTE
Patient was found to have thrombocytopenia, the likely etiology is secondary to sepsis/infection, will monitor the platelets Daily. Will transfuse if platelet count is <20k. Hold DVT prophylaxis if platelets are <50k. The patient's platelet results have been reviewed and are listed below.  Recent Labs   Lab 11/15/23  0339   PLT 71*     Stable. Continue to monitor.

## 2023-11-15 NOTE — PLAN OF CARE
Care plan reviewed on-going. Cont with NC/Bipap. Tolerating Diet and Ensure. Cont with Amiodarone drip. External cath in place. PT tanner valadez

## 2023-11-15 NOTE — ASSESSMENT & PLAN NOTE
Associated tachycardia. Likely reactive to current infective focus with growth of strep spp from blood cx samples.   11/15   - f/u echocardiogram  - f/u cardiology consult

## 2023-11-15 NOTE — NURSING
Patient placed on Bipap- current settings 14/7 fio2 30% per Denice RT at this time due to- labored breathing and tachypnea noted, Patient stated he feels short of breath. Patient was previously on 4L NC.

## 2023-11-15 NOTE — PLAN OF CARE
No Acute events during shift. PT remains on amio drip and remains in A Fib. Rate seems slightly better this morning, but pt is resting currently. During shift HR ranged from 80's- 140's in A Fib. Tolerating bipap on settings 14/7 fio2 30%.

## 2023-11-15 NOTE — ASSESSMENT & PLAN NOTE
Patient with acute kidney injury/acute renal failure likely due to pre-renal azotemia due to dehydration LEEANN is currently stable. Baseline creatinine  0.91  - Labs reviewed- Renal function/electrolytes with Estimated Creatinine Clearance: 104.1 mL/min (based on SCr of 1.1 mg/dL). according to latest data. Monitor urine output and serial BMP and adjust therapy as needed. Avoid nephrotoxins and renally dose meds for GFR listed above.    11/14 improving, continue to trend renal function  11/15 renal function back to baseline

## 2023-11-15 NOTE — ASSESSMENT & PLAN NOTE
Lab Results   Component Value Date    INR 2.6 (H) 11/15/2023    INR 2.6 (H) 11/14/2023    INR 2.8 (H) 11/13/2023   Home warfarin.

## 2023-11-15 NOTE — PLAN OF CARE
Problem: Physical Therapy  Goal: Physical Therapy Goal  Description: Goals to be met by: 2023     Patient will increase functional independence with mobility by performin. Supine to sit with Modified Independent.  2. Sit to supine with Modified Independent.  3. Bed to chair transfer with Modified Independent with proper A.D.  using Step Transfer technique.  4. Sit to Stand with Modified Independent with rolling walker.  5. Gait  x 150   feet with Modified Independent with proper A.D. .  6. Lower extremity exercise program x10 reps.      Outcome: Plan of care established

## 2023-11-15 NOTE — PROGRESS NOTES
[facility] Medicine  Progress Note    Patient Name: Chapo Rosario  MRN: [id number]  Patient Class: IP- Inpatient   Admission Date: 11/12/2023  Length of Stay: 3 days  Attending Physician: Joon Romero DO  Primary Care Provider: Flynn Delarosa MD    Subjective:     Principal Problem:Bacteremia  Acute Condition: atrial fibrillation RVR    HPI:  Chief Complaint   Patient presents with    Altered Mental Status       AMS status onset this am and fever since 3 pm yesterday. Tylenol given approximately 1 hr PTA. Recent cellulitis.      63-year-old male with a history of hypertension, aortic aneurysm, mechanical heart valve on Coumadin presents the emergency department with fever since yesterday with altered mental status, given Tylenol this morning, unknown mg.  He presents tachypneic, oxygen sat 85% on room air, oral temperature of 102.5° F, tachycardic.  No known exposure to COVID.  He does answer some questions, but appears somewhat lethargic.  Denies chest pain. Family states he is having some nausea vomiting and diarrhea as well.  History gathered from family members.    ED course: Vital signs at time of arrival temperature 102.5F, , RR 28, /57 mmHg, SpO2 85% on room air. Sepsis work up, lactate 8.3, WBC 14.37, Hg 14.4, HCT 40.1, . Serum Na 139, K 3.9, Cl 110, CO2 19, Glu 179, BUN/Cr 30/2 (baseline Cr 0.91), Ca 8.3, Alb 3.4, Tbili 3, AST 93, ALT 47. Cardiac work up with NT proBNP 4573, EKG tracings sinus tachycardia, no ST-T wave elevation. Procalcitonin 32.56.   Patient started on broad spectrum antibiotics and received sepsis IVF bolus.   Patient's mental status has improved since his fever has come down answering questions appropriately per ER Attending's notes.     Patient admitted to medicine service for continued treatment of sepsis with LEEANN secondary to bacteremia and pneumonia. Patient negative for COVID19, influenza, RSV. Blood cx x2 PCR positive for  streptococcus spp. Continue with respiratory support with BiPAP continuous.       Overview/Hospital Course:  11/14 Overnight tachycardia 140-160s, given IV metoprolol 5 mg x3 with no response, follow up IV cardizem 30 mg given, but rate continues to fluctuate. EKG findings of new onset atrial fibrillation RVR. Denies chest pain, palpitations. Continuous BiPAP, SpO2 >96% on FiO2 30%. Tmax overnight 99.5F. For better rate control will start cardizem gtt. Follow up cardiology consult for arrhythmia and bacteremia in setting of mechanical heart valve. Continue with IV antibiotics and respiratory care.   11/15 Per cardiology, patient has been started on amiodarone gtt HR range 70-130s. Telemetry tracings atrial fibrillation. No elevated temperature. Renal function back to baseline, LFTs down trending, leukocytosis resolved. INR within therapeutic range. Continue rocephin for Group G bacteremia, pending echocardiogram findings, duration of 14 days required for treatment. Continue respiratory support with BiPAP and continuous O2. Follow cardiology recommendations for Afib.           Interval History: Patient seen and examined.     Review of Systems   Constitutional:  Positive for activity change. Negative for appetite change, chills and fever.   HENT:  Negative for sore throat.    Respiratory:  Positive for shortness of breath. Negative for cough, chest tightness and wheezing.    Cardiovascular:  Negative for chest pain, palpitations and leg swelling.   Gastrointestinal:  Negative for abdominal distention, abdominal pain, blood in stool, constipation, diarrhea, nausea and vomiting.   Musculoskeletal:  Negative for back pain, joint swelling, neck pain and neck stiffness.   Skin:  Negative for pallor and wound.   Neurological:  Positive for weakness. Negative for dizziness, seizures, syncope, speech difficulty, light-headedness and numbness.   Psychiatric/Behavioral:  Negative for agitation, behavioral problems, dysphoric  mood and sleep disturbance. The patient is not nervous/anxious and is not hyperactive.      Objective:     Vital Signs (Most Recent):  Temp: 97.8 °F (36.6 °C) (11/15/23 0705)  Pulse: (!) 113 (11/15/23 0630)  Resp: (!) 26 (11/15/23 0630)  BP: 131/78 (11/15/23 0630)  SpO2: 98 % (11/15/23 0630) Vital Signs (24h Range):  Temp:  [97.4 °F (36.3 °C)-98 °F (36.7 °C)] 97.8 °F (36.6 °C)  Pulse:  [] 113  Resp:  [19-38] 26  SpO2:  [92 %-98 %] 98 %  BP: (107-155)/(61-90) 131/78     Weight: (!) 137.6 kg (303 lb 6.4 oz)  Body mass index is 38.95 kg/m².    Intake/Output Summary (Last 24 hours) at 11/15/2023 0847  Last data filed at 11/15/2023 0506  Gross per 24 hour   Intake 1217.13 ml   Output 1450 ml   Net -232.87 ml         Physical Exam  Vitals and nursing note reviewed. Exam conducted with a chaperone present.   Constitutional:       General: He is not in acute distress.     Appearance: He is obese. He is ill-appearing. He is not toxic-appearing or diaphoretic.   HENT:      Right Ear: External ear normal.      Left Ear: External ear normal.      Nose: No congestion or rhinorrhea.      Mouth/Throat:      Mouth: Mucous membranes are dry.      Pharynx: Oropharynx is clear. No oropharyngeal exudate or posterior oropharyngeal erythema.   Eyes:      Extraocular Movements: Extraocular movements intact.      Conjunctiva/sclera: Conjunctivae normal.   Cardiovascular:      Rate and Rhythm: Tachycardia present. Rhythm irregular.   Pulmonary:      Breath sounds: No wheezing or rhonchi.      Comments: BiPAP FiO2 30%  Chest:      Chest wall: No tenderness.   Abdominal:      Tenderness: There is no right CVA tenderness or left CVA tenderness.      Comments: protuberant   Musculoskeletal:         General: No swelling.      Cervical back: Normal range of motion and neck supple. No rigidity or tenderness.      Right lower leg: Edema (trace bilaterally) present.      Left lower leg: Edema present.   Lymphadenopathy:      Cervical: No  cervical adenopathy.   Skin:     General: Skin is warm and dry.      Capillary Refill: Capillary refill takes less than 2 seconds.   Neurological:      General: No focal deficit present.      Mental Status: He is alert and oriented to person, place, and time. Mental status is at baseline.      Cranial Nerves: No cranial nerve deficit.   Psychiatric:         Mood and Affect: Mood normal.         Behavior: Behavior normal.         Thought Content: Thought content normal.           Significant Labs: All pertinent labs within the past 24 hours have been reviewed.  Bilirubin:   Recent Labs   Lab 11/12/23  0837 11/13/23  0526 11/14/23  0505 11/15/23  0339   BILITOT 3.0* 4.3* 3.5* 2.2*     BMP:   Recent Labs   Lab 11/15/23  0339   *      K 3.5      CO2 27   BUN 35*   CREATININE 1.1   CALCIUM 8.2*     CBC:   Recent Labs   Lab 11/14/23  0505 11/15/23  0339   WBC 8.37 9.45   HGB 11.9* 11.7*   HCT 33.9* 33.8*   PLT 73* 71*     CMP:   Recent Labs   Lab 11/14/23  0505 11/15/23  0339    141   K 3.6 3.5    110   CO2 25 27   * 198*   BUN 47* 35*   CREATININE 1.5* 1.1   CALCIUM 8.2* 8.2*   PROT 6.8 6.5   ALBUMIN 3.0* 2.9*   BILITOT 3.5* 2.2*   ALKPHOS 47* 67   AST 89* 61*   ALT 62* 55*   ANIONGAP 5 4     Significant Imaging: I have reviewed all pertinent imaging results/findings within the past 24 hours.    Assessment/Plan:      * Bacteremia  11/12 Blood Cx x2 growing strep spp. identified via PCR. Patient in 2018 hx of group B strep bacteremia.   Initial vanc zosyn x1 will change to rocephin per sensitivity and susceptibility study.   11/14 continue with Rocephin, sensitive to Group G strep  11/15 D4 IV antibiotics, continue Rocephin  - continue supportive care  - f/u echocardiogram          New onset a-fib  Patient with Paroxysmal (<7 days) atrial fibrillation which is uncontrolled currently with Calcium Channel Blocker. Patient is currently in atrial fibrillation. Anticoagulation indicated.  Anticoagulation done with warfarin in therapeutic range  .  11/15 On amiodarone gtt rate range 70-130s. Telemetry tracings atrial fibrillation. Patient remains on intermittent BiPAP, SpO2 >97%  - f/u cardiology recommendations      Thrombocytopenia  Patient was found to have thrombocytopenia, the likely etiology is secondary to sepsis/infection, will monitor the platelets Daily. Will transfuse if platelet count is <20k. Hold DVT prophylaxis if platelets are <50k. The patient's platelet results have been reviewed and are listed below.  Recent Labs   Lab 11/15/23  0339   PLT 71*     Stable. Continue to monitor.       Elevated brain natriuretic peptide (BNP) level  Associated tachycardia. Likely reactive to current infective focus with growth of strep spp from blood cx samples.   11/15   - f/u echocardiogram  - f/u cardiology consult      Encephalopathy, metabolic  Per ER note, patient more alert and appropriately responding to questions after IVF, antipyretics and control of elevated temperature. Continue to monitor.   11/14 Back to baseline, endorsing feeling generalized weakness  11/15 Patient endorses small improvement      LEEANN (acute kidney injury)  Patient with acute kidney injury/acute renal failure likely due to pre-renal azotemia due to dehydration LEEANN is currently stable. Baseline creatinine  0.91  - Labs reviewed- Renal function/electrolytes with Estimated Creatinine Clearance: 104.1 mL/min (based on SCr of 1.1 mg/dL). according to latest data. Monitor urine output and serial BMP and adjust therapy as needed. Avoid nephrotoxins and renally dose meds for GFR listed above.    11/14 improving, continue to trend renal function  11/15 renal function back to baseline    Fever  See above. No elevated temperature over the past 48 hours.     Severe sepsis  This patient does have evidence of infective focus  My overall impression is sepsis.  Source: Respiratory and bacteremia  Antibiotics given-   Antibiotics (72h  ago, onward)      Start     Stop Route Frequency Ordered    11/14/23 1100  mupirocin 2 % ointment         11/19/23 0859 Nasl 2 times daily 11/14/23 0952    11/14/23 0900  cefTRIAXone (ROCEPHIN) 2 g in dextrose 5 % in water (D5W) 100 mL IVPB (MB+)         -- IV Every 24 hours (non-standard times) 11/13/23 1119          Latest lactate reviewed-  Recent Labs   Lab 11/13/23  0620 11/13/23  1621 11/13/23 2012   LACTATE 6.9* 3.7* 4.6*       Organ dysfunction indicated by Acute kidney injury, Acute respiratory failure, Acute heart failure, and Encephalopathy    Fluid challenge Ideal Body Weight- The patient's ideal body weight is Ideal body weight: 82.2 kg (181 lb 3.5 oz) which will be used to calculate fluid bolus of 30 ml/kg for treatment of septic shock.      Post- resuscitation assessment Yes Perfusion exam was performed within 6 hours of septic shock presentation after bolus shows Adequate tissue perfusion assessed by non-invasive monitoring     Will Not start Pressors- Levophed for MAP of 65  Source control achieved by: IVF boluses and IV antibiotics    Mechanical heart valve present  Lab Results   Component Value Date    INR 2.6 (H) 11/15/2023    INR 2.6 (H) 11/14/2023    INR 2.8 (H) 11/13/2023   Home warfarin.         VTE Risk Mitigation (From admission, onward)           Ordered     warfarin split tablet 4 mg  Every Tues, Thurs        See Hyperspace for full Linked Orders Report.    11/12/23 1433     warfarin split tablet 8 mg  Once per day on Sun Mon Wed Fri Sat        See Hyperspace for full Linked Orders Report.    11/12/23 1433     IP VTE HIGH RISK PATIENT  Once         11/12/23 1312     Place sequential compression device  Until discontinued         11/12/23 1312     Place AINSLEY hose  Until discontinued         11/12/23 1312                    Discharge Planning   CATALINA:      Code Status: Full Code   Is the patient medically ready for discharge?:     Reason for patient still in hospital (select all that apply):  Patient trending condition, Treatment, and Consult recommendations  Discharge Plan A: Home                  Joon Romero DO  Department of Hospital Medicine   [place] - Intensive Care

## 2023-11-15 NOTE — SUBJECTIVE & OBJECTIVE
Interval History: Patient seen and examined.     Review of Systems   Constitutional:  Positive for activity change. Negative for appetite change, chills and fever.   HENT:  Negative for sore throat.    Respiratory:  Positive for shortness of breath. Negative for cough, chest tightness and wheezing.    Cardiovascular:  Negative for chest pain, palpitations and leg swelling.   Gastrointestinal:  Negative for abdominal distention, abdominal pain, blood in stool, constipation, diarrhea, nausea and vomiting.   Musculoskeletal:  Negative for back pain, joint swelling, neck pain and neck stiffness.   Skin:  Negative for pallor and wound.   Neurological:  Positive for weakness. Negative for dizziness, seizures, syncope, speech difficulty, light-headedness and numbness.   Psychiatric/Behavioral:  Negative for agitation, behavioral problems, dysphoric mood and sleep disturbance. The patient is not nervous/anxious and is not hyperactive.      Objective:     Vital Signs (Most Recent):  Temp: 97.8 °F (36.6 °C) (11/15/23 0705)  Pulse: (!) 113 (11/15/23 0630)  Resp: (!) 26 (11/15/23 0630)  BP: 131/78 (11/15/23 0630)  SpO2: 98 % (11/15/23 0630) Vital Signs (24h Range):  Temp:  [97.4 °F (36.3 °C)-98 °F (36.7 °C)] 97.8 °F (36.6 °C)  Pulse:  [] 113  Resp:  [19-38] 26  SpO2:  [92 %-98 %] 98 %  BP: (107-155)/(61-90) 131/78     Weight: (!) 137.6 kg (303 lb 6.4 oz)  Body mass index is 38.95 kg/m².    Intake/Output Summary (Last 24 hours) at 11/15/2023 0847  Last data filed at 11/15/2023 0506  Gross per 24 hour   Intake 1217.13 ml   Output 1450 ml   Net -232.87 ml         Physical Exam  Vitals and nursing note reviewed. Exam conducted with a chaperone present.   Constitutional:       General: He is not in acute distress.     Appearance: He is obese. He is ill-appearing. He is not toxic-appearing or diaphoretic.   HENT:      Right Ear: External ear normal.      Left Ear: External ear normal.      Nose: No congestion or rhinorrhea.       Mouth/Throat:      Mouth: Mucous membranes are dry.      Pharynx: Oropharynx is clear. No oropharyngeal exudate or posterior oropharyngeal erythema.   Eyes:      Extraocular Movements: Extraocular movements intact.      Conjunctiva/sclera: Conjunctivae normal.   Cardiovascular:      Rate and Rhythm: Tachycardia present. Rhythm irregular.   Pulmonary:      Breath sounds: No wheezing or rhonchi.      Comments: BiPAP FiO2 30%  Chest:      Chest wall: No tenderness.   Abdominal:      Tenderness: There is no right CVA tenderness or left CVA tenderness.      Comments: protuberant   Musculoskeletal:         General: No swelling.      Cervical back: Normal range of motion and neck supple. No rigidity or tenderness.      Right lower leg: Edema (trace bilaterally) present.      Left lower leg: Edema present.   Lymphadenopathy:      Cervical: No cervical adenopathy.   Skin:     General: Skin is warm and dry.      Capillary Refill: Capillary refill takes less than 2 seconds.   Neurological:      General: No focal deficit present.      Mental Status: He is alert and oriented to person, place, and time. Mental status is at baseline.      Cranial Nerves: No cranial nerve deficit.   Psychiatric:         Mood and Affect: Mood normal.         Behavior: Behavior normal.         Thought Content: Thought content normal.           Significant Labs: All pertinent labs within the past 24 hours have been reviewed.  Bilirubin:   Recent Labs   Lab 11/12/23  0837 11/13/23  0526 11/14/23  0505 11/15/23  0339   BILITOT 3.0* 4.3* 3.5* 2.2*     BMP:   Recent Labs   Lab 11/15/23  0339   *      K 3.5      CO2 27   BUN 35*   CREATININE 1.1   CALCIUM 8.2*     CBC:   Recent Labs   Lab 11/14/23  0505 11/15/23  0339   WBC 8.37 9.45   HGB 11.9* 11.7*   HCT 33.9* 33.8*   PLT 73* 71*     CMP:   Recent Labs   Lab 11/14/23  0505 11/15/23  0339    141   K 3.6 3.5    110   CO2 25 27   * 198*   BUN 47* 35*   CREATININE 1.5*  1.1   CALCIUM 8.2* 8.2*   PROT 6.8 6.5   ALBUMIN 3.0* 2.9*   BILITOT 3.5* 2.2*   ALKPHOS 47* 67   AST 89* 61*   ALT 62* 55*   ANIONGAP 5 4     Significant Imaging: I have reviewed all pertinent imaging results/findings within the past 24 hours.

## 2023-11-15 NOTE — CARE UPDATE
11/14/23 1925   PRE-TX-O2   Device (Oxygen Therapy) BIPAP   Oxygen Concentration (%) 30   SpO2 95 %   Pulse (!) 135   Resp (!) 24         Pt was a little tachypnic, 30 bpm.  After wearing BIPAP for 10 min, breaths went down to low 20s.  Will keep pt on BIPAP while sleeping, continue to monitor.

## 2023-11-15 NOTE — PT/OT/SLP EVAL
"Physical Therapy Evaluation     Patient Name: Chapo Rosario   MRN: [id number]  Recent Surgery: * No surgery found *      Recommendations:     Discharge Recommendations: Low Intensity Therapy   Discharge Equipment Recommendations: none   Barriers to discharge: None    Assessment:     Chapo Rosario is a 63 y.o. male admitted with a medical diagnosis of Bacteremia. He presents with the following impairments/functional limitations: weakness, gait instability, decreased ROM, impaired cardiopulmonary response to activity, impaired endurance, impaired balance, decreased lower extremity function, impaired joint extensibility, decreased safety awareness, impaired muscle length, impaired self care skills, impaired functional mobility. Patient reports that he was independent with all IADL's and ambulatory without A.D. prior to hospital admission. No DME access.  At the time of evaluation, patient is in ICU with HR between 130-145 bpm, no c/o chest pain.  He required minimal assistance with supine to sit, moderate assistance with sit to supine and tolerated static sitting at edge of bed for 10 minutes with SBA  while monitoring heart rate.  No standing or gait done at this time due to increase HR.  We will work with patient to increase functional mobility and endurance  and progress as tolerated.     Rehab Prognosis: Good and Fair; patient would benefit from acute PT services to address these deficits and reach maximum level of function.    Plan:     During this hospitalization, patient to be seen 5 x/week to address the above listed problems via gait training, therapeutic activities, therapeutic exercises, neuromuscular re-education    Plan of Care Expires: 11/22/23    Subjective     Chief Complaint: "I want to sit up."   Patient Comments/Goals: Move around.   Pain/Comfort:  Pain Rating 1: 0/10  Pain Rating Post-Intervention 1: 0/10    Social History:  Living Environment: Patient lives with their girlfriend  and mother in a " single story home   Prior Level of Function: Prior to admission, patient was independent  Equipment Used at Home: none  DME owned (not currently used): none  Assistance Upon Discharge: family    Objective:     Communicated with nurse, patient and friend  prior to session. Patient found HOB elevated with blood pressure cuff, telemetry, pulse ox (continuous), peripheral IV, PureWick, oxygen upon PT entry to room.    General Precautions: Standard, fall, other (see comments) (tachycardia (140's))   Orthopedic Precautions: N/A   Braces: N/A    Respiratory Status: Nasal cannula, flow 2 L/min    Exams:  Cognition: Patient is oriented to Person, Place, Time, Situation  RLE ROM: WFL  RLE Strength: WFL  LLE ROM: WFL  LLE Strength: WFL  Fine Motor Coordination:    -       Intact  Gross Motor Coordination: WFL  Postural Exam: Patient presented with the following abnormalities:    -       Rounded shoulders  Sensation:    -       Intact  Skin Integrity/Edema:     -       Skin integrity: Visible skin intact    Functional Mobility:  Gait belt applied -  no, patient can only sit at the edge of the bed   Bed Mobility  Rolling Left: minimum assistance  Rolling Right: minimum assistance  Scooting: minimum assistance  Supine to Sit: minimum assistance for LE management and trunk management  Sit to Supine: moderate assistance for LE management and trunk management  Transfers  Activity did not occur due to tachycardia   Gait  Activity did not occur due to tachycardia     Balance  Sitting: supervision  Standing: Activity did not occur due to tachycardia         Therapeutic Activities and Exercises:   Patient educated on role of acute care PT and PT POC, safety while in hospital including calling nurse for mobility, call light usage  Increase HR at rest     AM-PAC 6 CLICK MOBILITY  Total Score:10    Patient left HOB elevated with all lines intact, call button in reach, RN notified, bed alarm on, and friend present.    GOALS:    Multidisciplinary Problems       Physical Therapy Goals          Problem: Physical Therapy    Goal Priority Disciplines Outcome Goal Variances Interventions   Physical Therapy Goal     PT, PT/OT Ongoing, Progressing     Description: Goals to be met by: 2023     Patient will increase functional independence with mobility by performin. Supine to sit with Modified Independent.  2. Sit to supine with Modified Independent.  3. Bed to chair transfer with Modified Independent with proper A.D.  using Step Transfer technique.  4. Sit to Stand with Modified Independent with rolling walker.  5. Gait  x 150   feet with Modified Independent with proper A.D. .  6. Lower extremity exercise program x10 reps.                           History:     Past Medical History:   Diagnosis Date    Aortic aneurysm     Hypertension        Past Surgical History:   Procedure Laterality Date    APPENDECTOMY      CARDIAC SURGERY      CHOLECYSTECTOMY      NECK SURGERY         Time Tracking:     PT Received On: 11/15/23  PT Start Time: 1427  PT Stop Time: 1444  PT Total Time (min): 17 min     Billable Minutes: Evaluation high complexity     11/15/2023

## 2023-11-15 NOTE — ASSESSMENT & PLAN NOTE
Patient with Paroxysmal (<7 days) atrial fibrillation which is uncontrolled currently with Calcium Channel Blocker. Patient is currently in atrial fibrillation. Anticoagulation indicated. Anticoagulation done with warfarin in therapeutic range  .  11/15 On amiodarone gtt rate range 70-130s. Telemetry tracings atrial fibrillation. Patient remains on intermittent BiPAP, SpO2 >97%  - f/u cardiology recommendations

## 2023-11-15 NOTE — PROGRESS NOTES
Pharmacist Intervention IV to PO Note    Chapo MARTIN Liner is a 63 y.o. male being treated with IV medication famotidine    Patient Data:    Vital Signs (Most Recent):  Temp: 97.8 °F (36.6 °C) (11/15/23 0705)  Pulse: (!) 113 (11/15/23 0630)  Resp: (!) 26 (11/15/23 0630)  BP: 131/78 (11/15/23 0630)  SpO2: 98 % (11/15/23 0630) Vital Signs (72h Range):  Temp:  [95.9 °F (35.5 °C)-102.5 °F (39.2 °C)]   Pulse:  []   Resp:  [19-38]   BP: ()/(48-90)   SpO2:  [85 %-98 %]      CBC:  Recent Labs   Lab 11/13/23  0527 11/14/23  0505 11/15/23  0339   WBC 13.21* 8.37 9.45   RBC 3.95* 3.46* 3.47*   HGB 13.6* 11.9* 11.7*   HCT 39.2* 33.9* 33.8*   PLT 88* 73* 71*   MCV 99* 98 97   MCH 34.4* 34.4* 33.7*   MCHC 34.7 35.1 34.6     CMP:     Recent Labs   Lab 11/13/23  0526 11/14/23  0505 11/15/23  0339   * 230* 198*   CALCIUM 7.9* 8.2* 8.2*   ALBUMIN 3.3* 3.0* 2.9*   PROT 7.3 6.8 6.5    140 141   K 4.2 3.6 3.5   CO2 17* 25 27   * 110 110   BUN 48* 47* 35*   CREATININE 2.1* 1.5* 1.1   ALKPHOS 34* 47* 67   ALT 72* 62* 55*   * 89* 61*   BILITOT 4.3* 3.5* 2.2*       Dietary Orders:  Diet Orders            Diet clear liquid: Clear Liquid starting at 11/12 1313            Based on the following criteria, this patient qualifies for intravenous to oral conversion:  [x] The patients gastrointestinal tract is functioning (tolerating medications via oral or enteral route for 24 hours and tolerating food or enteral feeds for 24 hours).  [x] The patient is hemodynamically stable for 24 hours (heart rate <100 beats per minute, systolic blood pressure >99 mm Hg, and respiratory rate <20 breaths per minute).  [x] The patient shows clinical improvement (afebrile for at least 24 hours and white blood cell count downtrending or normalized). Additionally, the patient must be non-neutropenic (absolute neutrophil count >500 cells/mm3).  [x] For antimicrobials, the patient has received IV therapy for at least 24 hours.    IV  medication famotidine will be changed to oral medication famotidine    Pharmacist's Name: Haylee Avila  Pharmacist's Extension: [phone]

## 2023-11-15 NOTE — PROGRESS NOTES
[facility] Surg  Cardiology  Progress Note    Patient Name: Chapo Rosario  MRN: [id number]  Admission Date: 11/12/2023  Hospital Length of Stay: 3 days  Code Status: Full Code   Attending Provider: Joon Romero DO   Primary Care Physician: Flynn Delarosa MD  Principal Problem:Bacteremia    Patient information was obtained from patient, past medical records, and ER records.         Subjective:     Chief Complaint:  Fever, altered mental status     HPI:   This is a 63-year-old male who presented to the emergency department with a 3 day history of fever and altered mental status.  It was noted he had a recent cellulitis infection.  He was tachypneic, tachycardic and febrile upon arrival.  He was started on broad-spectrum IV antibiotics and continuous BiPAP.  His labs were notable for elevated pro BNP 4573, PT INR 23/2.6, positive blood cultures for Streptococcus species.    Patient follows with Dr. Ortiz.  He has cardiac history of s/p mechanical aortic valve replacement, ascending aorta tube graft, hypertension, hyperlipidemia, and a history of group B Streptococcus sepsis.  He was last seen in clinic September 2023 and was noted to be doing well at that time.    He was admitted to internal medicine with sepsis/bacteremia, and cis has been consulted.  Overnight, he remains tachycardic with a max heart rate of 187, average 160s.  He received 50 total mg of IV diltiazem as well as diltiazem p.o. 30 mg every 6 hours.  He also received a total of 15 mg IV Lopressor.  His heart rate has continued to be elevated, currently 120s to 140s.  He is seen on continuous BiPAP in moderate distress, and can not answer questions thoroughly.  Majority of history is obtained from family at bedside and past medical records.    EKG this morning reveals atrial fibrillation with rapid ventricular response, this is a new diagnosis of patient has no history of AFib.  He is currently on warfarin for anticoagulation after  mechanical valve replacement.  On exam today, heart rate is irregular and tachycardic.  He does also have 3+ swelling to bilateral lower extremities.  He received 1 dose of 40 mg IV Lasix.  He has an echocardiogram ordered pending results.    11/15/2023:   Yesterday, patient was transferred to ICU for close monitoring and care.  He was started on diltiazem gtt, and initially rate improved.  However, he reverted to RVR, and was subsequently started on IV amiodarone.  His rate continues to improve however sustaining in the 110s.  His blood pressure is stable, systolic and 120s to 130s.  His respiratory status has improved, and is currently oxygenating well on nasal cannula.  Overall, he has improved since yesterday and appears comfortable resting in the hospital bed.  He continues on IV antibiotics, diltiazem p.o., warfarin.  PT/INR remains within the goal range, white blood cell count has improved.  Echocardiogram resulted in atrial fibrillation RVR with an estimated EF of 55-70%.  There was no valvular vegetation noted, and the mechanical valve appears to be functioning well.  He denies any active chest pain, worsening shortness of breath, lightheadedness, palpitations.  He states he feels his breathing has greatly improved since he was admitted.    Past Medical History:   Diagnosis Date    Aortic aneurysm     Hypertension        Past Surgical History:   Procedure Laterality Date    APPENDECTOMY      CARDIAC SURGERY  2013    CHOLECYSTECTOMY      NECK SURGERY         Review of patient's allergies indicates:  No Known Allergies    No current facility-administered medications on file prior to encounter.     Current Outpatient Medications on File Prior to Encounter   Medication Sig    amLODIPine (NORVASC) 5 MG tablet Take 5 mg by mouth once daily.    aspirin (ECOTRIN) 81 MG EC tablet Take 81 mg by mouth once daily.    docusate sodium (COLACE) 100 MG capsule Take 100 mg by mouth 2 (two) times daily.    fenofibric acid  (FIBRICOR) 135 mg CpDR Take 135 mg by mouth once daily.    tadalafiL (CIALIS) 5 MG tablet Take 5 mg by mouth every evening.    vitamin D 1000 units Tab Take 5,000 Units by mouth once daily.    warfarin (COUMADIN) 6 MG tablet Take 4 mg by mouth Daily. 4 mg every Tuesday and Thursday. 8 mg on Sunday, Monday, Wednesday, Friday, and Saturday     Family History    None       Tobacco Use    Smoking status: Former    Smokeless tobacco: Not on file   Substance and Sexual Activity    Alcohol use: Not on file    Drug use: Not on file    Sexual activity: Not on file     Review of Systems   Constitutional: Negative for fever.   Cardiovascular:  Negative for chest pain, dyspnea on exertion, near-syncope and palpitations.   Respiratory:  Positive for cough, shortness of breath and sputum production.    Neurological:  Negative for light-headedness and weakness.   All other systems reviewed and are negative.    Objective:     Vital Signs (Most Recent):  Temp: 97.8 °F (36.6 °C) (11/15/23 2000)  Pulse: 92 (11/15/23 1939)  Resp: (!) 24 (11/15/23 1939)  BP: (!) 145/72 (11/15/23 1729)  SpO2: 97 % (11/15/23 1939) Vital Signs (24h Range):  Temp:  [97.4 °F (36.3 °C)-98 °F (36.7 °C)] 97.8 °F (36.6 °C)  Pulse:  [] 92  Resp:  [20-32] 24  SpO2:  [95 %-99 %] 97 %  BP: (113-151)/(62-90) 145/72     Weight: (!) 137.6 kg (303 lb 6.4 oz)  Body mass index is 38.95 kg/m².    SpO2: 97 %         Intake/Output Summary (Last 24 hours) at 11/15/2023 2129  Last data filed at 11/15/2023 1800  Gross per 24 hour   Intake 1974.84 ml   Output 2200 ml   Net -225.16 ml       Lines/Drains/Airways       Drain  Duration             Male External Urinary Catheter 11/14/23 0930 Other (Comment) 1 day              Peripheral Intravenous Line  Duration                  Peripheral IV - Single Lumen 11/12/23 0823 18 G Right Antecubital 3 days         Midline Catheter Insertion/Assessment  - Single Lumen 11/14/23 1459 Left basilic vein (medial side of arm) 1 day         " Peripheral IV - Single Lumen 11/14/23 1030 18 G Left Antecubital 1 day                    Physical Exam  Vitals and nursing note reviewed.   Constitutional:       Appearance: Normal appearance.   HENT:      Head: Normocephalic and atraumatic.      Mouth/Throat:      Mouth: Mucous membranes are moist.      Pharynx: Oropharynx is clear.   Eyes:      Extraocular Movements: Extraocular movements intact.   Cardiovascular:      Rate and Rhythm: Tachycardia present. Rhythm irregular.      Heart sounds:      No friction rub. No gallop.      Comments: Noted mechanical valve clicking on auscultation  Pulmonary:      Effort: Pulmonary effort is normal.      Breath sounds: Wheezing (mild) present.      Comments: On continuous BiPAP  Musculoskeletal:      Right lower leg: Edema present.      Left lower leg: Edema present.      Comments: Edema improved from yesterday, trace to 1+ bilaterally   Skin:     General: Skin is warm.      Capillary Refill: Capillary refill takes less than 2 seconds.   Neurological:      General: No focal deficit present.      Mental Status: He is alert.      Comments: Somnolent, but arousable         Significant Labs: BMP:   Recent Labs   Lab 11/14/23  0505 11/15/23  0339   * 198*    141   K 3.6 3.5    110   CO2 25 27   BUN 47* 35*   CREATININE 1.5* 1.1   CALCIUM 8.2* 8.2*   , CMP   Recent Labs   Lab 11/14/23  0505 11/15/23  0339    141   K 3.6 3.5    110   CO2 25 27   * 198*   BUN 47* 35*   CREATININE 1.5* 1.1   CALCIUM 8.2* 8.2*   PROT 6.8 6.5   ALBUMIN 3.0* 2.9*   BILITOT 3.5* 2.2*   ALKPHOS 47* 67   AST 89* 61*   ALT 62* 55*   ANIONGAP 5 4   , CBC   Recent Labs   Lab 11/14/23  0505 11/15/23  0339   WBC 8.37 9.45   HGB 11.9* 11.7*   HCT 33.9* 33.8*   PLT 73* 71*   , Troponin No results for input(s): "TROPONINI" in the last 48 hours., and All pertinent lab results from the last 24 hours have been reviewed.    Significant Imaging:     TTE 11/14/2023:      Atrial " fibrillation with rvr is noted during this study.    Left Ventricle: The left ventricle is normal in size. Normal wall thickness. Normal wall motion. There is normal systolic function with a visually estimated ejection fraction of 55 - 70%.    Right Ventricle: Normal right ventricular cavity size. Systolic function is normal.    Left Atrium: Left atrium is mildly dilated.    Aortic Valve: There is a mechanical valve in the aortic position that is well-seated.    No pericardial effusion.    Echocardiogram 04/03/2023:  The study quality is below average.   The left ventricle is mildly enlarged. Left ventricular diastolic dimension is 6.2 cms. The left ventricular ejection fraction is 55-60%. Global left ventricular systolic function is normal. Left ventricular diastolic function is normal. Noted left ventricular hypertrophy. It is mild.   The left atrial diameter is mildly increased. Left atrial diameter is 4.3 cms. The right atrium is mildly enlarged. The aortic root appears mildly dilated. Aortic root diameter is 4.0 cms.  Trace tricuspid regurgitation. Trace mitral regurgitation.  The pulmonary artery systolic pressure is 19 mmHg.   The prosthetic valve in the aortic position functions normally. A metallic valve is noted at the level of the aortic valve. Aortic valve area continuity equation is 1.7 cm². The trans-aortic peak velocity is 2.2 m/s. The trans-aortic peak gradient is 19.9 mmHg. The trans-aortic mean gradient is 11.0 mmHg. LVOT Diameter is 1.9 cms.     Exercise treadmill test 04/12/2023:  Stress EKG is normal.   Maximal exercise treadmill test (MPHR : 105 %).  The functional capacity is good (10.3 METs).  The heart rate recovery is normal.   The study quality is good.     Assessment and Plan:    New onset atrial fibrillation, rapid ventricular response  -EKG 12 lead revealing atrial fibrillation with   -received a total of 50 mg IV diltiazem, currently p.o. diltiazem 30 mg q6h  -started on IV  amiodarone yesterday, HR improved so he was transitioned to po amiodarone but his HR increased again so he will be loaded with IV amiodarone at 1mg/min for another 24 hours. -currently on warfarin for history of mechanical valve, PT/INR within range 23/2.6   **HR will likely remain somewhat elevated given his ongoing pneumonia and will likely improve as his clinical status improves    Elevated BNP, resolved   -pro BNP 4573 on arrival  -improvement in lower extremity swelling and respiratory status  -received 40 mg IV Lasix x1  -BUN/Cr 35/1.1, returned to baseline  -echocardiogram with EF estimated 55-70%    Acute respiratory distress, improved  -currently on nasal cannula  -O2, 98%  -continue management per primary    Sepsis, improving  Bacteremia, improving  LEEANN, improving  -febrile, tachycardic, tachypneic with elevated lactate on presentation  -blood culture result Streptococcus group G, IV antibiotic regimen transitioned to ceftriaxone  -currently afebrile, remains tachycardic   -continue with the primary    S/p mechanical aortic valve replacement  -stable, continue warfarin  -PT/INR 23/2.6  -echocardiogram without evidence of valvular vegetation  -if infection does not continue to improve or worsens, GISELE to evaluate mechanical valve     Hypertension   -blood pressures at goal, systolics 120s to 130s  -hold home meds, resume as needed  -continue to monitor blood pressure    Hyperlipidemia     S/P ascending aorta tube graft    H/o group B Streptococcus sepsis     Active Diagnoses:    Diagnosis Date Noted POA    PRINCIPAL PROBLEM:  Bacteremia [R78.81] 11/13/2023 Yes    New onset a-fib [I48.91] 11/14/2023 No    Fever [R50.9] 11/13/2023 Yes    LEEANN (acute kidney injury) [N17.9] 11/13/2023 Yes    Encephalopathy, metabolic [G93.41] 11/13/2023 Yes    Elevated brain natriuretic peptide (BNP) level [R79.89] 11/13/2023 Yes    Thrombocytopenia [D69.6] 11/13/2023 Yes    Severe sepsis [A41.9, R65.20] 08/30/2018 Yes     Mechanical heart valve present [Z95.2] 08/30/2018 Not Applicable      Problems Resolved During this Admission:       VTE Risk Mitigation (From admission, onward)           Ordered     warfarin split tablet 4 mg  Every Tues, Thurs        See Hyperspace for full Linked Orders Report.    11/12/23 1433     warfarin split tablet 8 mg  Once per day on Sun Mon Wed Fri Sat        See Hyperspace for full Linked Orders Report.    11/12/23 1433     IP VTE HIGH RISK PATIENT  Once         11/12/23 1312     Place sequential compression device  Until discontinued         11/12/23 1312     Place AINSLEY hose  Until discontinued         11/12/23 1312                    Thank you for your consult. I will follow-up with patient. Please contact us if you have any additional questions.    Ashley Herrera PA-C  Cardiology     I have personally interviewed and examined this patient face-to-face. As the physician, I have documented the above plan and rendered all medical decision making for this encounter. I have read and agree with the above documentation unless otherwise noted.     Antonio Ortiz MD  Cardiology   [facility]

## 2023-11-15 NOTE — EICU
Intervention Initiated From:  COR / SEAN Dorsey intervened regarding:  Rounding (Video assessment)    Comments:  Video rounding completed.  Pt resting quiet on Bipap.  No distress noted.  Infusing Amiodarone gtt at 0.5 mcg/min.  VSS

## 2023-11-16 VITALS
WEIGHT: 303.38 LBS | DIASTOLIC BLOOD PRESSURE: 74 MMHG | RESPIRATION RATE: 24 BRPM | OXYGEN SATURATION: 98 % | HEIGHT: 74 IN | BODY MASS INDEX: 38.93 KG/M2 | TEMPERATURE: 98 F | SYSTOLIC BLOOD PRESSURE: 150 MMHG | HEART RATE: 111 BPM

## 2023-11-16 PROBLEM — R50.9 FEVER: Status: RESOLVED | Noted: 2023-11-13 | Resolved: 2023-11-16

## 2023-11-16 PROBLEM — J96.01 ACUTE HYPOXEMIC RESPIRATORY FAILURE: Status: ACTIVE | Noted: 2023-11-16

## 2023-11-16 PROBLEM — R79.89 ELEVATED BRAIN NATRIURETIC PEPTIDE (BNP) LEVEL: Status: RESOLVED | Noted: 2023-11-13 | Resolved: 2023-11-16

## 2023-11-16 PROBLEM — G93.41 ENCEPHALOPATHY, METABOLIC: Status: RESOLVED | Noted: 2023-11-13 | Resolved: 2023-11-16

## 2023-11-16 PROBLEM — R74.01 TRANSAMINITIS: Status: ACTIVE | Noted: 2023-11-16

## 2023-11-16 LAB
ALBUMIN SERPL BCP-MCNC: 2.5 G/DL (ref 3.5–5.2)
ALP SERPL-CCNC: 104 U/L (ref 55–135)
ALT SERPL W/O P-5'-P-CCNC: 60 U/L (ref 10–44)
ANION GAP SERPL CALC-SCNC: 1 MMOL/L (ref 3–11)
AST SERPL-CCNC: 106 U/L (ref 10–40)
BASOPHILS # BLD AUTO: 0.06 K/UL (ref 0–0.2)
BASOPHILS NFR BLD: 0.6 % (ref 0–1.9)
BILIRUB SERPL-MCNC: 1.6 MG/DL (ref 0.1–1)
BUN SERPL-MCNC: 20 MG/DL (ref 8–23)
CALCIUM SERPL-MCNC: 7.9 MG/DL (ref 8.7–10.5)
CHLORIDE SERPL-SCNC: 109 MMOL/L (ref 95–110)
CO2 SERPL-SCNC: 28 MMOL/L (ref 23–29)
CREAT SERPL-MCNC: 1 MG/DL (ref 0.5–1.4)
DIFFERENTIAL METHOD: ABNORMAL
EOSINOPHIL # BLD AUTO: 0 K/UL (ref 0–0.5)
EOSINOPHIL NFR BLD: 0.4 % (ref 0–8)
ERYTHROCYTE [DISTWIDTH] IN BLOOD BY AUTOMATED COUNT: 14.7 % (ref 11.5–14.5)
EST. GFR  (NO RACE VARIABLE): >60 ML/MIN/1.73 M^2
ESTIMATED AVG GLUCOSE: 137 MG/DL (ref 68–131)
GLUCOSE SERPL-MCNC: 182 MG/DL (ref 70–110)
HBA1C MFR BLD: 6.4 % (ref 4–5.6)
HCT VFR BLD AUTO: 34.1 % (ref 40–54)
HGB BLD-MCNC: 11.9 G/DL (ref 14–18)
IMM GRANULOCYTES # BLD AUTO: 0.26 K/UL (ref 0–0.04)
IMM GRANULOCYTES NFR BLD AUTO: 2.6 % (ref 0–0.5)
INR PPP: 3.6 (ref 0.8–1.2)
LYMPHOCYTES # BLD AUTO: 2.5 K/UL (ref 1–4.8)
LYMPHOCYTES NFR BLD: 24.9 % (ref 18–48)
MCH RBC QN AUTO: 34 PG (ref 27–31)
MCHC RBC AUTO-ENTMCNC: 34.9 G/DL (ref 32–36)
MCV RBC AUTO: 97 FL (ref 82–98)
MONOCYTES # BLD AUTO: 1.1 K/UL (ref 0.3–1)
MONOCYTES NFR BLD: 11.4 % (ref 4–15)
NEUTROPHILS # BLD AUTO: 6 K/UL (ref 1.8–7.7)
NEUTROPHILS NFR BLD: 60.1 % (ref 38–73)
NRBC BLD-RTO: 0 /100 WBC
PLATELET # BLD AUTO: 91 K/UL (ref 150–450)
PMV BLD AUTO: 12.1 FL (ref 9.2–12.9)
POTASSIUM SERPL-SCNC: 4.9 MMOL/L (ref 3.5–5.1)
PROT SERPL-MCNC: 6.4 G/DL (ref 6–8.4)
PROTHROMBIN TIME: 32.9 SEC (ref 9–12.5)
RBC # BLD AUTO: 3.5 M/UL (ref 4.6–6.2)
SODIUM SERPL-SCNC: 138 MMOL/L (ref 136–145)
TOXIC GRANULES BLD QL SMEAR: PRESENT
WBC # BLD AUTO: 9.94 K/UL (ref 3.9–12.7)

## 2023-11-16 PROCEDURE — 85025 COMPLETE CBC W/AUTO DIFF WBC: CPT | Performed by: STUDENT IN AN ORGANIZED HEALTH CARE EDUCATION/TRAINING PROGRAM

## 2023-11-16 PROCEDURE — 63600175 PHARM REV CODE 636 W HCPCS: Performed by: STUDENT IN AN ORGANIZED HEALTH CARE EDUCATION/TRAINING PROGRAM

## 2023-11-16 PROCEDURE — 94761 N-INVAS EAR/PLS OXIMETRY MLT: CPT

## 2023-11-16 PROCEDURE — 83036 HEMOGLOBIN GLYCOSYLATED A1C: CPT | Performed by: STUDENT IN AN ORGANIZED HEALTH CARE EDUCATION/TRAINING PROGRAM

## 2023-11-16 PROCEDURE — 80053 COMPREHEN METABOLIC PANEL: CPT | Performed by: STUDENT IN AN ORGANIZED HEALTH CARE EDUCATION/TRAINING PROGRAM

## 2023-11-16 PROCEDURE — 94799 UNLISTED PULMONARY SVC/PX: CPT

## 2023-11-16 PROCEDURE — 25000003 PHARM REV CODE 250: Performed by: STUDENT IN AN ORGANIZED HEALTH CARE EDUCATION/TRAINING PROGRAM

## 2023-11-16 PROCEDURE — 27000221 HC OXYGEN, UP TO 24 HOURS

## 2023-11-16 PROCEDURE — 99233 SBSQ HOSP IP/OBS HIGH 50: CPT | Mod: ,,, | Performed by: STUDENT IN AN ORGANIZED HEALTH CARE EDUCATION/TRAINING PROGRAM

## 2023-11-16 PROCEDURE — 94660 CPAP INITIATION&MGMT: CPT

## 2023-11-16 PROCEDURE — A4216 STERILE WATER/SALINE, 10 ML: HCPCS | Performed by: STUDENT IN AN ORGANIZED HEALTH CARE EDUCATION/TRAINING PROGRAM

## 2023-11-16 PROCEDURE — 99233 PR SUBSEQUENT HOSPITAL CARE,LEVL III: ICD-10-PCS | Mod: ,,, | Performed by: STUDENT IN AN ORGANIZED HEALTH CARE EDUCATION/TRAINING PROGRAM

## 2023-11-16 PROCEDURE — 85610 PROTHROMBIN TIME: CPT | Performed by: STUDENT IN AN ORGANIZED HEALTH CARE EDUCATION/TRAINING PROGRAM

## 2023-11-16 PROCEDURE — 87040 BLOOD CULTURE FOR BACTERIA: CPT | Performed by: STUDENT IN AN ORGANIZED HEALTH CARE EDUCATION/TRAINING PROGRAM

## 2023-11-16 PROCEDURE — 63600175 PHARM REV CODE 636 W HCPCS

## 2023-11-16 PROCEDURE — 97166 OT EVAL MOD COMPLEX 45 MIN: CPT

## 2023-11-16 PROCEDURE — 36415 COLL VENOUS BLD VENIPUNCTURE: CPT | Performed by: STUDENT IN AN ORGANIZED HEALTH CARE EDUCATION/TRAINING PROGRAM

## 2023-11-16 PROCEDURE — 97530 THERAPEUTIC ACTIVITIES: CPT

## 2023-11-16 PROCEDURE — 97110 THERAPEUTIC EXERCISES: CPT

## 2023-11-16 PROCEDURE — 99900031 HC PATIENT EDUCATION (STAT)

## 2023-11-16 PROCEDURE — 25000003 PHARM REV CODE 250

## 2023-11-16 PROCEDURE — 99900035 HC TECH TIME PER 15 MIN (STAT)

## 2023-11-16 RX ORDER — AMOXICILLIN 250 MG
2 CAPSULE ORAL 2 TIMES DAILY PRN
Qty: 60 TABLET | Refills: 0 | Status: SHIPPED | OUTPATIENT
Start: 2023-11-16

## 2023-11-16 RX ORDER — DILTIAZEM HYDROCHLORIDE 30 MG/1
30 TABLET, FILM COATED ORAL EVERY 6 HOURS
Qty: 120 TABLET | Refills: 11 | Status: ON HOLD | OUTPATIENT
Start: 2023-11-16 | End: 2023-11-22 | Stop reason: HOSPADM

## 2023-11-16 RX ORDER — AMIODARONE HYDROCHLORIDE 200 MG/1
200 TABLET ORAL 2 TIMES DAILY
Qty: 60 TABLET | Refills: 11 | Status: ON HOLD | OUTPATIENT
Start: 2023-11-20 | End: 2023-11-22 | Stop reason: HOSPADM

## 2023-11-16 RX ORDER — AMIODARONE HYDROCHLORIDE 400 MG/1
400 TABLET ORAL 2 TIMES DAILY
Qty: 60 TABLET | Refills: 11 | Status: ON HOLD | OUTPATIENT
Start: 2023-11-16 | End: 2023-11-22 | Stop reason: HOSPADM

## 2023-11-16 RX ORDER — FAMOTIDINE 20 MG/1
20 TABLET, FILM COATED ORAL 2 TIMES DAILY
Qty: 60 TABLET | Refills: 11 | Status: SHIPPED | OUTPATIENT
Start: 2023-11-16 | End: 2024-11-15

## 2023-11-16 RX ORDER — AMOXICILLIN 250 MG
2 CAPSULE ORAL 2 TIMES DAILY PRN
Status: DISCONTINUED | OUTPATIENT
Start: 2023-11-16 | End: 2023-11-16 | Stop reason: HOSPADM

## 2023-11-16 RX ORDER — METOPROLOL SUCCINATE 25 MG/1
25 TABLET, EXTENDED RELEASE ORAL DAILY
Qty: 30 TABLET | Refills: 11 | Status: SHIPPED | OUTPATIENT
Start: 2023-11-17 | End: 2023-12-26 | Stop reason: DRUGHIGH

## 2023-11-16 RX ORDER — WARFARIN 4 MG/1
4 TABLET ORAL
Qty: 8 TABLET | Refills: 11 | Status: SHIPPED | OUTPATIENT
Start: 2023-11-16 | End: 2023-12-06

## 2023-11-16 RX ORDER — ACETAMINOPHEN 325 MG/1
650 TABLET ORAL EVERY 8 HOURS PRN
Qty: 60 TABLET | Refills: 0 | Status: SHIPPED | OUTPATIENT
Start: 2023-11-16 | End: 2024-03-21

## 2023-11-16 RX ORDER — WARFARIN 4 MG/1
8 TABLET ORAL
Qty: 32 TABLET | Refills: 11 | Status: SHIPPED | OUTPATIENT
Start: 2023-11-17 | End: 2023-12-06 | Stop reason: SDUPTHER

## 2023-11-16 RX ORDER — METOPROLOL SUCCINATE 25 MG/1
25 TABLET, EXTENDED RELEASE ORAL DAILY
Status: DISCONTINUED | OUTPATIENT
Start: 2023-11-16 | End: 2023-11-16 | Stop reason: HOSPADM

## 2023-11-16 RX ORDER — MUPIROCIN 20 MG/G
OINTMENT TOPICAL 2 TIMES DAILY
Qty: 15 G | Refills: 0 | Status: ON HOLD | OUTPATIENT
Start: 2023-11-16 | End: 2023-11-22 | Stop reason: HOSPADM

## 2023-11-16 RX ORDER — AMIODARONE HYDROCHLORIDE 200 MG/1
200 TABLET ORAL DAILY
Qty: 30 TABLET | Refills: 11 | Status: ON HOLD | OUTPATIENT
Start: 2023-11-27 | End: 2023-11-22 | Stop reason: HOSPADM

## 2023-11-16 RX ORDER — METHOCARBAMOL 750 MG/1
750 TABLET, FILM COATED ORAL ONCE
Status: COMPLETED | OUTPATIENT
Start: 2023-11-16 | End: 2023-11-16

## 2023-11-16 RX ORDER — AMOXICILLIN 250 MG
2 CAPSULE ORAL DAILY PRN
Status: DISCONTINUED | OUTPATIENT
Start: 2023-11-16 | End: 2023-11-16

## 2023-11-16 RX ADMIN — DILTIAZEM HYDROCHLORIDE 30 MG: 30 TABLET, FILM COATED ORAL at 06:11

## 2023-11-16 RX ADMIN — ASPIRIN 81 MG: 81 TABLET, COATED ORAL at 09:11

## 2023-11-16 RX ADMIN — DILTIAZEM HYDROCHLORIDE 30 MG: 30 TABLET, FILM COATED ORAL at 12:11

## 2023-11-16 RX ADMIN — AMIODARONE HYDROCHLORIDE 1 MG/MIN: 1.8 INJECTION, SOLUTION INTRAVENOUS at 04:11

## 2023-11-16 RX ADMIN — AMIODARONE HYDROCHLORIDE 400 MG: 200 TABLET ORAL at 09:11

## 2023-11-16 RX ADMIN — SODIUM BICARBONATE 650 MG TABLET 650 MG: at 09:11

## 2023-11-16 RX ADMIN — Medication 10 ML: at 01:11

## 2023-11-16 RX ADMIN — CEFTRIAXONE 2 G: 2 INJECTION, POWDER, FOR SOLUTION INTRAMUSCULAR; INTRAVENOUS at 09:11

## 2023-11-16 RX ADMIN — FAMOTIDINE 20 MG: 20 TABLET, FILM COATED ORAL at 09:11

## 2023-11-16 RX ADMIN — METOPROLOL SUCCINATE 25 MG: 25 TABLET, EXTENDED RELEASE ORAL at 09:11

## 2023-11-16 RX ADMIN — DILTIAZEM HYDROCHLORIDE 30 MG: 30 TABLET, FILM COATED ORAL at 05:11

## 2023-11-16 RX ADMIN — Medication 10 ML: at 06:11

## 2023-11-16 RX ADMIN — MUPIROCIN: 20 OINTMENT TOPICAL at 09:11

## 2023-11-16 RX ADMIN — Medication 10 ML: at 05:11

## 2023-11-16 RX ADMIN — METHOCARBAMOL TABLETS 750 MG: 750 TABLET, COATED ORAL at 12:11

## 2023-11-16 NOTE — PROGRESS NOTES
[facility]  Progress Note    Patient Name: Chapo Rosario  MRN: [id number]  Patient Class: IP- Inpatient   Admission Date: 11/12/2023  Length of Stay: 4 days  Attending Physician: Joon Romero DO  Primary Care Provider: Flynn Delarosa MD        Subjective:     Principal Problem:Bacteremia  Acute Condition: Afib with RVR  HPI:  Chief Complaint   Patient presents with    Altered Mental Status       AMS status onset this am and fever since 3 pm yesterday. Tylenol given approximately 1 hr PTA. Recent cellulitis.      63-year-old male with a history of hypertension, aortic aneurysm, mechanical heart valve on Coumadin presents the emergency department with fever since yesterday with altered mental status, given Tylenol this morning, unknown mg.  He presents tachypneic, oxygen sat 85% on room air, oral temperature of 102.5° F, tachycardic.  No known exposure to COVID.  He does answer some questions, but appears somewhat lethargic.  Denies chest pain. Family states he is having some nausea vomiting and diarrhea as well.  History gathered from family members.    ED course: Vital signs at time of arrival temperature 102.5F, , RR 28, /57 mmHg, SpO2 85% on room air. Sepsis work up, lactate 8.3, WBC 14.37, Hg 14.4, HCT 40.1, . Serum Na 139, K 3.9, Cl 110, CO2 19, Glu 179, BUN/Cr 30/2 (baseline Cr 0.91), Ca 8.3, Alb 3.4, Tbili 3, AST 93, ALT 47. Cardiac work up with NT proBNP 4573, EKG tracings sinus tachycardia, no ST-T wave elevation. Procalcitonin 32.56.   Patient started on broad spectrum antibiotics and received sepsis IVF bolus.   Patient's mental status has improved since his fever has come down answering questions appropriately per ER Attending's notes.     Patient admitted to medicine service for continued treatment of sepsis with LEEANN secondary to bacteremia and pneumonia. Patient negative for COVID19, influenza, RSV. Blood cx x2 PCR positive for streptococcus  spp. Continue with respiratory support with BiPAP continuous.       Overview/Hospital Course:  11/14 Overnight tachycardia 140-160s, given IV metoprolol 5 mg x3 with no response, follow up IV cardizem 30 mg given, but rate continues to fluctuate. EKG findings of new onset atrial fibrillation RVR. Denies chest pain, palpitations. Continuous BiPAP, SpO2 >96% on FiO2 30%. Tmax overnight 99.5F. For better rate control will start cardizem gtt. Follow up cardiology consult for arrhythmia and bacteremia in setting of mechanical heart valve. Continue with IV antibiotics and respiratory care.   11/15 Per cardiology, patient has been started on amiodarone gtt HR range 70-130s. Telemetry tracings atrial fibrillation. No elevated temperature. Renal function back to baseline, LFTs down trending, leukocytosis resolved. INR within therapeutic range. Continue rocephin for Group G bacteremia, pending echocardiogram findings, duration of 14 days required for treatment. Continue respiratory support with BiPAP and continuous O2. Follow cardiology recommendations for Afib.      11/16 Afebrile. No acute events overnight. Echocardiogram findings of atrial fibrillation, LVEF 55-70%, no wall motion abnormality, mild left atrial dilation, aortic mechanical valve in place, no evidence of valvular vegetation. HR 80-120s, SpO2 >97% on continuous O2 4L via nasal cannula, breathing more comfortably. On cardizem and amiodarone for rate and rhythm control. Continue rocephin D5 for bacteremia, if infection persists, then cardiology recommends GISELE to evaluated mechanical valve.     Interval History: Patient seen and examined.     Review of Systems   Constitutional:  Positive for activity change. Negative for appetite change, chills and fever.   HENT:  Negative for sore throat.    Respiratory:  Negative for cough, chest tightness, shortness of breath and wheezing.    Cardiovascular:  Negative for chest pain, palpitations and leg swelling.    Gastrointestinal:  Negative for abdominal distention, abdominal pain, blood in stool, constipation, diarrhea, nausea and vomiting.   Musculoskeletal:  Negative for back pain, joint swelling, neck pain and neck stiffness.   Skin:  Negative for pallor and wound.   Neurological:  Positive for weakness. Negative for dizziness, seizures, syncope, speech difficulty, light-headedness and numbness.   Psychiatric/Behavioral:  Negative for agitation, behavioral problems, dysphoric mood and sleep disturbance. The patient is not nervous/anxious and is not hyperactive.      Objective:     Vital Signs (Most Recent):  Temp: 96.5 °F (35.8 °C) (11/16/23 0400)  Pulse: 109 (11/16/23 0600)  Resp: (!) 28 (11/16/23 0600)  BP: 125/82 (11/16/23 0600)  SpO2: 97 % (11/16/23 0600) Vital Signs (24h Range):  Temp:  [96.5 °F (35.8 °C)-98 °F (36.7 °C)] 96.5 °F (35.8 °C)  Pulse:  [] 109  Resp:  [20-34] 28  SpO2:  [95 %-99 %] 97 %  BP: ()/(55-82) 125/82     Weight: (!) 137.6 kg (303 lb 6.4 oz)  Body mass index is 38.95 kg/m².    Intake/Output Summary (Last 24 hours) at 11/16/2023 0647  Last data filed at 11/16/2023 0549  Gross per 24 hour   Intake 2059.34 ml   Output 3140 ml   Net -1080.66 ml         Physical Exam  Vitals and nursing note reviewed. Exam conducted with a chaperone present.   Constitutional:       General: He is not in acute distress.     Appearance: He is obese. He is ill-appearing. He is not toxic-appearing or diaphoretic.   HENT:      Right Ear: External ear normal.      Left Ear: External ear normal.      Nose: No congestion or rhinorrhea.      Mouth/Throat:      Mouth: Mucous membranes are dry.      Pharynx: Oropharynx is clear. No oropharyngeal exudate or posterior oropharyngeal erythema.   Eyes:      Extraocular Movements: Extraocular movements intact.      Conjunctiva/sclera: Conjunctivae normal.   Cardiovascular:      Rate and Rhythm: Tachycardia present. Rhythm irregular.   Pulmonary:      Breath sounds: No  wheezing or rhonchi.      Comments: BiPAP FiO2 30%  Chest:      Chest wall: No tenderness.   Abdominal:      General: There is no distension.      Palpations: Abdomen is soft.      Tenderness: There is no abdominal tenderness. There is no right CVA tenderness, left CVA tenderness or guarding.      Comments: protuberant   Musculoskeletal:         General: No swelling.      Cervical back: Normal range of motion and neck supple. No rigidity or tenderness.      Right lower leg: No edema (trace bilaterally).      Left lower leg: No edema.   Lymphadenopathy:      Cervical: No cervical adenopathy.   Skin:     General: Skin is warm and dry.      Capillary Refill: Capillary refill takes less than 2 seconds.   Neurological:      General: No focal deficit present.      Mental Status: He is alert and oriented to person, place, and time. Mental status is at baseline.      Cranial Nerves: No cranial nerve deficit.   Psychiatric:         Mood and Affect: Mood normal.         Behavior: Behavior normal.         Thought Content: Thought content normal.           Significant Labs: All pertinent labs within the past 24 hours have been reviewed.  Bilirubin:   Recent Labs   Lab 11/12/23  0837 11/13/23  0526 11/14/23  0505 11/15/23  0339 11/16/23  0433   BILITOT 3.0* 4.3* 3.5* 2.2* 1.6*     BMP:   Recent Labs   Lab 11/16/23 0433   *      K 4.9      CO2 28   BUN 20   CREATININE 1.0   CALCIUM 7.9*     CBC:   Recent Labs   Lab 11/15/23  0339 11/16/23  0433   WBC 9.45 9.94   HGB 11.7* 11.9*   HCT 33.8* 34.1*   PLT 71* 91*     CMP:   Recent Labs   Lab 11/15/23  0339 11/16/23  0433    138   K 3.5 4.9    109   CO2 27 28   * 182*   BUN 35* 20   CREATININE 1.1 1.0   CALCIUM 8.2* 7.9*   PROT 6.5 6.4   ALBUMIN 2.9* 2.5*   BILITOT 2.2* 1.6*   ALKPHOS 67 104   AST 61* 106*   ALT 55* 60*   ANIONGAP 4 1*     Coagulation:   Recent Labs   Lab 11/16/23 0433   INR 3.6*   Results for orders placed during the hospital  encounter of 11/12/23    Echo    Interpretation Summary    Atrial fibrillation with rvr is noted during this study.    Left Ventricle: The left ventricle is normal in size. Normal wall thickness. Normal wall motion. There is normal systolic function with a visually estimated ejection fraction of 55 - 70%.    Right Ventricle: Normal right ventricular cavity size. Systolic function is normal.    Left Atrium: Left atrium is mildly dilated.    Aortic Valve: There is a mechanical valve in the aortic position that is well-seated.    No pericardial effusion.    Significant Imaging: I have reviewed all pertinent imaging results/findings within the past 24 hours.    Assessment/Plan:      * Bacteremia  11/12 Blood Cx x2 growing strep spp. identified via PCR. Patient in 2018 hx of group B strep bacteremia.   Initial vanc zosyn x1 will change to rocephin per sensitivity and susceptibility study.   11/14 continue with Rocephin, sensitive to Group G strep  11/16 Echocardiogram with no evidence of vegetation. LVEF 55-70%. Continue course with total 14 days therapy. If infection persists, no improvement or condition worsens then GISELE recommended per cardiology  - D5 IV antibiotics, continue Rocephin  - continue supportive care            New onset a-fib  Patient with Paroxysmal (<7 days) atrial fibrillation which is uncontrolled currently with Calcium Channel Blocker. Patient is currently in atrial fibrillation. Anticoagulation indicated. Anticoagulation done with warfarin in therapeutic range  .  11/15 On amiodarone gtt rate range 70-130s. Telemetry tracings atrial fibrillation. SpO2 >97% on continuous O2 4L via nasal cannula  - overnight started PO amiodarone with continue gtt loading, rate 80-120s  - f/u cardiology recommendations        Thrombocytopenia  Patient was found to have thrombocytopenia, the likely etiology is secondary to sepsis/infection, will monitor the platelets Daily. Will transfuse if platelet count is <20k.  Hold DVT prophylaxis if platelets are <50k. The patient's platelet results have been reviewed and are listed below.  Recent Labs   Lab 11/16/23  0433   PLT 91*     Stable. Continue to monitor.       Elevated brain natriuretic peptide (BNP) level  Associated tachycardia. Likely reactive to current infective focus with growth of strep spp from blood cx samples.   11/15   - f/u echocardiogram  - f/u cardiology consult      Encephalopathy, metabolic  Per ER note, patient more alert and appropriately responding to questions after IVF, antipyretics and control of elevated temperature. Continue to monitor.   11/14 Back to baseline, endorsing feeling generalized weakness  11/15 Patient endorses small improvement      LEEANN (acute kidney injury)  Patient with acute kidney injury/acute renal failure likely due to pre-renal azotemia due to dehydration LEEANN is currently stable. Baseline creatinine  0.91  - Labs reviewed- Renal function/electrolytes with Estimated Creatinine Clearance: 114.6 mL/min (based on SCr of 1 mg/dL). according to latest data. Monitor urine output and serial BMP and adjust therapy as needed. Avoid nephrotoxins and renally dose meds for GFR listed above.    11/14 improving, continue to trend renal function  11/15 renal function back to baseline    Fever  See above. No elevated temperature over the past 48 hours.     Severe sepsis  This patient does have evidence of infective focus  My overall impression is sepsis.  Source: Respiratory and bacteremia  Antibiotics given-   Antibiotics (72h ago, onward)      Start     Stop Route Frequency Ordered    11/14/23 1100  mupirocin 2 % ointment         11/19/23 0859 Nasl 2 times daily 11/14/23 0952    11/14/23 0900  cefTRIAXone (ROCEPHIN) 2 g in dextrose 5 % in water (D5W) 100 mL IVPB (MB+)         -- IV Every 24 hours (non-standard times) 11/13/23 1119          Latest lactate reviewed-  Recent Labs   Lab 11/13/23  1621 11/13/23 2012   LACTATE 3.7* 4.6*       Organ  dysfunction indicated by Acute kidney injury, Acute respiratory failure, Acute heart failure, and Encephalopathy    Fluid challenge Ideal Body Weight- The patient's ideal body weight is Ideal body weight: 82.2 kg (181 lb 3.5 oz) which will be used to calculate fluid bolus of 30 ml/kg for treatment of septic shock.      Post- resuscitation assessment Yes Perfusion exam was performed within 6 hours of septic shock presentation after bolus shows Adequate tissue perfusion assessed by non-invasive monitoring     Will Not start Pressors- Levophed for MAP of 65  Source control achieved by: IVF boluses and IV antibiotics    11/16 Renal function back to baseline. LFTs down trending. Respiratory status improving with rate control status improving for new onset A fib RVR, Echocardiogram with no findings of vegetation. Continue with Rocephin for Group G strep bacteremia treatment.      Mechanical heart valve present  Lab Results   Component Value Date    INR 3.6 (H) 11/16/2023    INR 2.6 (H) 11/15/2023    INR 2.6 (H) 11/14/2023   Hold warfarin dose x1 day         VTE Risk Mitigation (From admission, onward)           Ordered     warfarin split tablet 4 mg  Every Tues, Thurs        See Hyperspace for full Linked Orders Report.    11/12/23 1433     warfarin split tablet 8 mg  Once per day on Sun Mon Wed Fri Sat        See Hyperspace for full Linked Orders Report.    11/12/23 1433     IP VTE HIGH RISK PATIENT  Once         11/12/23 1312     Place sequential compression device  Until discontinued         11/12/23 1312     Place AINSLEY hose  Until discontinued         11/12/23 1312                    Discharge Planning   [place]:      Code Status: Full Code   Is the patient medically ready for discharge?:     Reason for patient still in hospital (select all that apply): Patient trending condition, Treatment, Consult recommendations, and PT / OT recommendations  Discharge Plan A: Home                  Joon Romero DO  Department of  [facility] - Intensive Care

## 2023-11-16 NOTE — DISCHARGE SUMMARY
[facility] Medicine  Discharge Summary      Patient Name: Chapo Rosario  MRN: [id number]  LEYLA: [id number]  Patient Class: IP- Inpatient  Admission Date: 11/12/2023  Hospital Length of Stay: 4 days  Discharge Date and Time:  11/16/2023 3:53 PM  Attending Physician: Joon Romero DO   Discharging Provider: Joon Romero DO  Primary Care Provider: Flynn Delarosa MD    Primary Care Team: Networked reference to record PCT     HPI:   Chief Complaint   Patient presents with    Altered Mental Status       AMS status onset this am and fever since 3 pm yesterday. Tylenol given approximately 1 hr PTA. Recent cellulitis.      63-year-old male with a history of hypertension, aortic aneurysm, mechanical heart valve on Coumadin presents the emergency department with fever since yesterday with altered mental status, given Tylenol this morning, unknown mg.  He presents tachypneic, oxygen sat 85% on room air, oral temperature of 102.5° F, tachycardic.  No known exposure to COVID.  He does answer some questions, but appears somewhat lethargic.  Denies chest pain. Family states he is having some nausea vomiting and diarrhea as well.  History gathered from family members.    ED course: Vital signs at time of arrival temperature 102.5F, , RR 28, /57 mmHg, SpO2 85% on room air. Sepsis work up, lactate 8.3, WBC 14.37, Hg 14.4, HCT 40.1, . Serum Na 139, K 3.9, Cl 110, CO2 19, Glu 179, BUN/Cr 30/2 (baseline Cr 0.91), Ca 8.3, Alb 3.4, Tbili 3, AST 93, ALT 47. Cardiac work up with NT proBNP 4573, EKG tracings sinus tachycardia, no ST-T wave elevation. Procalcitonin 32.56.   Patient started on broad spectrum antibiotics and received sepsis IVF bolus.   Patient's mental status has improved since his fever has come down answering questions appropriately per ER Attending's notes.     Patient admitted to medicine service for continued treatment of sepsis with LEEANN secondary to bacteremia  and pneumonia. Patient negative for COVID19, influenza, RSV. Blood cx x2 PCR positive for streptococcus spp. Continue with respiratory support with BiPAP continuous.       * No surgery found *      Hospital Course:   11/14 Overnight tachycardia 140-160s, given IV metoprolol 5 mg x3 with no response, follow up IV cardizem 30 mg given, but rate continues to fluctuate. EKG findings of new onset atrial fibrillation RVR. Denies chest pain, palpitations. Continuous BiPAP, SpO2 >96% on FiO2 30%. Tmax overnight 99.5F. For better rate control will start cardizem gtt. Follow up cardiology consult for arrhythmia and bacteremia in setting of mechanical heart valve. Continue with IV antibiotics and respiratory care.   11/15 Per cardiology, patient has been started on amiodarone gtt HR range 70-130s. Telemetry tracings atrial fibrillation. No elevated temperature. Renal function back to baseline, LFTs down trending, leukocytosis resolved. INR within therapeutic range. Continue rocephin for Group G bacteremia, pending echocardiogram findings, duration of 14 days required for treatment. Continue respiratory support with BiPAP and continuous O2. Follow cardiology recommendations for Afib.      11/16 Afebrile. No acute events overnight. Echocardiogram findings of atrial fibrillation, LVEF 55-70%, no wall motion abnormality, mild left atrial dilation, aortic mechanical valve in place, no evidence of valvular vegetation. HR 80-120s, SpO2 >97% on continuous O2 4L via nasal cannula, breathing more comfortably. On cardizem and amiodarone for rate and rhythm control. Continue rocephin D5 for bacteremia, if infection persists, then cardiology recommends GISELE to evaluated mechanical valve.   Patient raised concern for left first toe nail discoloration which has been spreading over the past two months. Appearance blue black at nail plate with irregular borders, more likely bruising from bleeding underneath nail bed. Patient denies trauma to  foot or toe. Explained warfarin therapy may increase his risk for micro bleeds. Patient agrees to follow up outpatient with dermatology for further evaluation.       Discussed with Dr. Ortiz on patient's potential need for cardioversion for atrial fibrillation and ventricular rate running >120 bpm not responding to medical therapy, may consider cardioversion and continue monitoring status of bacteremia with need for transesophageal echocardiogram. Both procedures requiring higher level of care, plan to transfer to [facility] intensive care and cardiology services.        Goals of Care Treatment Preferences:  Code Status: Full Code      Consults:   Consults (From admission, onward)          Status Ordering Provider     Inpatient consult to Midline team  Once        Provider:  (Not yet assigned)    Acknowledged SCOTTY SINGLETON     Inpatient consult to Cardiology  Once        Provider:  Antonio Ortiz MD    Completed SCOTTY SINGLETON            Neuro  Encephalopathy, metabolic-resolved as of 11/16/2023  Per ER note, patient more alert and appropriately responding to questions after IVF, antipyretics and control of elevated temperature. Continue to monitor.   11/14 Back to baseline, endorsing feeling generalized weakness  11/15 Patient endorses small improvement  11/16 Appetite returning      Cardiac/Vascular  * New onset a-fib  Patient with Paroxysmal (<7 days) atrial fibrillation which is uncontrolled currently with Calcium Channel Blocker. Patient is currently in atrial fibrillation. Anticoagulation indicated. Anticoagulation done with warfarin in therapeutic range  .  11/15 On amiodarone gtt rate range 70-130s. Telemetry tracings atrial fibrillation. SpO2 >97% on continuous O2 4L via nasal cannula  - overnight started PO amiodarone with continue gtt loading, rate 80-120s  - f/u cardiology recommendations  11/16 Transitioned from IV to PO amiodarone. HR increasing >120s. Overall patient presents  with generalized weakness and exhaustion, no overt symptoms. Afebrile. Oxygen requirements have improved since time of admission to continuous O2 4L via nasal cannula.   Requesting transfer to [facility] for continued services including cardioversion and GISELE with cardiology services.  Primary cardiologist, Dr. Ortiz.          Mechanical heart valve present  Lab Results   Component Value Date    INR 3.6 (H) 11/16/2023    INR 2.6 (H) 11/15/2023    INR 2.6 (H) 11/14/2023   Hold warfarin dose x1 day, recheck INR and morning.        Elevated brain natriuretic peptide (BNP) level-resolved as of 11/16/2023  Associated tachycardia. Likely reactive to current infective focus with growth of strep spp from blood cx samples.   11/15   - f/u echocardiogram  - f/u cardiology consult      Renal/  LEEANN (acute kidney injury)  Patient with acute kidney injury/acute renal failure likely due to pre-renal azotemia due to dehydration LEEANN is currently stable. Baseline creatinine  0.91  - Labs reviewed- Renal function/electrolytes with Estimated Creatinine Clearance: 114.6 mL/min (based on SCr of 1 mg/dL). according to latest data. Monitor urine output and serial BMP and adjust therapy as needed. Avoid nephrotoxins and renally dose meds for GFR listed above.    11/14 improving, continue to trend renal function  11/15 renal function back to baseline    ID  Bacteremia  11/12 Blood Cx x2 growing strep spp. identified via PCR. Patient in 2018 hx of group B strep bacteremia.   Initial vanc zosyn x1 will change to rocephin per sensitivity and susceptibility study.   11/14 continue with Rocephin, sensitive to Group G strep  11/16 Echocardiogram with no evidence of vegetation. LVEF 55-70%. Continue course with total 14 days therapy. If infection persists, no improvement or condition worsens then GISELE recommended per cardiology  - D5 IV antibiotics, continue Rocephin  - continue supportive care            Severe sepsis  This patient does  have evidence of infective focus  My overall impression is sepsis.  Source: Respiratory and bacteremia  Antibiotics given-   Antibiotics (72h ago, onward)      Start     Stop Route Frequency Ordered    11/14/23 1100  mupirocin 2 % ointment         11/19/23 0859 Nasl 2 times daily 11/14/23 0952    11/14/23 0900  cefTRIAXone (ROCEPHIN) 2 g in dextrose 5 % in water (D5W) 100 mL IVPB (MB+)         -- IV Every 24 hours (non-standard times) 11/13/23 1119          Latest lactate reviewed-  Recent Labs   Lab 11/13/23  1621 11/13/23 2012   LACTATE 3.7* 4.6*       Organ dysfunction indicated by Acute kidney injury, Acute respiratory failure, Acute heart failure, and Encephalopathy    Fluid challenge Ideal Body Weight- The patient's ideal body weight is Ideal body weight: 82.2 kg (181 lb 3.5 oz) which will be used to calculate fluid bolus of 30 ml/kg for treatment of septic shock.      Post- resuscitation assessment Yes Perfusion exam was performed within 6 hours of septic shock presentation after bolus shows Adequate tissue perfusion assessed by non-invasive monitoring     Will Not start Pressors- Levophed for MAP of 65  Source control achieved by: IVF boluses and IV antibiotics    11/16 Renal function back to baseline. LFTs down trending. Respiratory status improving with rate running 80-120s with new onset A fib RVR. Echocardiogram with no findings of vegetation. Given past bacteremia with Group B strep, may benefit from follow up GISELE to see blind spot created from mechanical aortic valve.   Patient has been afebrile and leukocytosis resolved since time of admission.   - continue with Rocephin for Group G strep bacteremia treatment    Hematology  Thrombocytopenia  Patient was found to have thrombocytopenia, the likely etiology is secondary to sepsis/infection, will monitor the platelets Daily. Will transfuse if platelet count is <20k. Hold DVT prophylaxis if platelets are <50k. The patient's platelet results have been  reviewed and are listed below.  Recent Labs   Lab 11/16/23 0433   PLT 91*     Recovering. Continue to monitor.       Other  Fever-resolved as of 11/16/2023  See above. No elevated temperature over the past 48 hours.       Final Active Diagnoses:    Diagnosis Date Noted POA    PRINCIPAL PROBLEM:  New onset a-fib [I48.91] 11/14/2023 No    Bacteremia [R78.81] 11/13/2023 Yes    LEEANN (acute kidney injury) [N17.9] 11/13/2023 Yes    Thrombocytopenia [D69.6] 11/13/2023 Yes    Severe sepsis [A41.9, R65.20] 08/30/2018 Yes    Mechanical heart valve present [Z95.2] 08/30/2018 Not Applicable      Problems Resolved During this Admission:    Diagnosis Date Noted Date Resolved POA    Fever [R50.9] 11/13/2023 11/16/2023 Yes    Encephalopathy, metabolic [G93.41] 11/13/2023 11/16/2023 Yes    Elevated brain natriuretic peptide (BNP) level [R79.89] 11/13/2023 11/16/2023 Yes       Discharged Condition: stable    Disposition: Another Health Care Inst*    Follow Up:    Patient Instructions:   No discharge procedures on file.    Significant Diagnostic Studies: Labs: BMP:   Recent Labs   Lab 11/15/23  0339 11/16/23  0433   * 182*    138   K 3.5 4.9    109   CO2 27 28   BUN 35* 20   CREATININE 1.1 1.0   CALCIUM 8.2* 7.9*   , CMP   Recent Labs   Lab 11/15/23  0339 11/16/23  0433    138   K 3.5 4.9    109   CO2 27 28   * 182*   BUN 35* 20   CREATININE 1.1 1.0   CALCIUM 8.2* 7.9*   PROT 6.5 6.4   ALBUMIN 2.9* 2.5*   BILITOT 2.2* 1.6*   ALKPHOS 67 104   AST 61* 106*   ALT 55* 60*   ANIONGAP 4 1*   , CBC   Recent Labs   Lab 11/15/23  0339 11/16/23  0433   WBC 9.45 9.94   HGB 11.7* 11.9*   HCT 33.8* 34.1*   PLT 71* 91*   , INR   Lab Results   Component Value Date    INR 3.6 (H) 11/16/2023    INR 2.6 (H) 11/15/2023    INR 2.6 (H) 11/14/2023     Recent Labs   Lab 11/16/23  0433   HGBA1C 6.4*     Microbiology: Blood Culture   Lab Results   Component Value Date    LABBLOO  11/12/2023     Gram stain aer bottle: Gram  positive cocci in chains resembling Strep    LABBLOO  11/12/2023     Results called to and read back by: VALENTINO PAULINA 11/12/2023  21:16    LABBLOO  11/12/2023     Gram stain julia bottle: Gram positive cocci in chains resembling Strep    LABBLOO Positive results previously called 11/12/2023 11/12/2023    LABBLOO (A) 11/12/2023     STREPTOCOCCUS GROUP G  Beta-hemolytic streptococci are routinely susceptible to   penicillins,cephalosporins and carbapenems.  For susceptibility see order #[id number]       Echo    Atrial fibrillation with rvr is noted during this study.    Left Ventricle: The left ventricle is normal in size. Normal wall   thickness. Normal wall motion. There is normal systolic function with a   visually estimated ejection fraction of 55 - 70%.    Right Ventricle: Normal right ventricular cavity size. Systolic   function is normal.    Left Atrium: Left atrium is mildly dilated.    Aortic Valve: There is a mechanical valve in the aortic position that   is well-seated.    No pericardial effusion.     Pending Diagnostic Studies:       None           Medications:  Transfer Medications (for Discharge Readmit only):   Current Facility-Administered Medications   Medication Dose Route Frequency Provider Last Rate Last Admin    acetaminophen tablet 650 mg  650 mg Oral Q8H PRN Joon Romero DO   650 mg at 11/15/23 1643    [START ON 11/20/2023] amiodarone tablet 200 mg  200 mg Oral BID Ashley Herrera PA-C        [START ON 11/27/2023] amiodarone tablet 200 mg  200 mg Oral Daily Ashley Herrera PA-C        amiodarone tablet 400 mg  400 mg Oral BID Ashley Herrera PA-C   400 mg at 11/16/23 0936    aspirin EC tablet 81 mg  81 mg Oral Daily Joon Romero, DO   81 mg at 11/16/23 0933    cefTRIAXone (ROCEPHIN) 2 g in dextrose 5 % in water (D5W) 100 mL IVPB (MB+)  2 g Intravenous Q24H Jono Romero DO   Stopped at 11/16/23 1004    diltiaZEM tablet 30 mg  30 mg Oral Q6H Joon Romero DO   30 mg at  11/16/23 1259    famotidine tablet 20 mg  20 mg Oral BID Joon Romero, DO   20 mg at 11/16/23 0933    metoprolol succinate (TOPROL-XL) 24 hr tablet 25 mg  25 mg Oral Daily Ashley Herrera PA-C   25 mg at 11/16/23 0933    morphine injection 1 mg  1 mg Intravenous Q4H PRN Joon Romero, DO   1 mg at 11/13/23 1304    mupirocin 2 % ointment   Nasal BID Joon Romero, DO   Given at 11/16/23 0951    ondansetron injection 4 mg  4 mg Intravenous Q8H PRN Joon Romero, DO   4 mg at 11/13/23 0124    senna-docusate 8.6-50 mg per tablet 2 tablet  2 tablet Oral BID PRN Joon Romero, DO        sodium chloride 0.9% flush 10 mL  10 mL Intravenous PRN Joon Romero, DO        sodium chloride 0.9% flush 10 mL  10 mL Intravenous Q6H Joon Romero, DO   10 mL at 11/16/23 1317    And    sodium chloride 0.9% flush 10 mL  10 mL Intravenous PRN Joon Romero, DO        warfarin split tablet 4 mg  4 mg Oral Every Tues, Thurs Joon Romero, DO   4 mg at 11/14/23 1811    And    warfarin split tablet 8 mg  8 mg Oral Once per day on Sun Mon Wed Fri Sat Joon Romero, DO   8 mg at 11/15/23 1646       Indwelling Lines/Drains at time of discharge:   Lines/Drains/Airways       Drain  Duration             Male External Urinary Catheter 11/14/23 0930 Other (Comment) 2 days                  Time spent on the discharge of patient: 30 minutes         Joon Romero DO  Department of Hospital Medicine  [facility] - Intensive Care

## 2023-11-16 NOTE — ASSESSMENT & PLAN NOTE
Per ER note, patient more alert and appropriately responding to questions after IVF, antipyretics and control of elevated temperature. Continue to monitor.   11/14 Back to baseline, endorsing feeling generalized weakness  11/15 Patient endorses small improvement  11/16 Appetite returning

## 2023-11-16 NOTE — PROGRESS NOTES
[facility] Surg  Cardiology  Progress Note    Patient Name: Chapo Rosario  MRN: [id number]  Admission Date: 11/12/2023  Hospital Length of Stay: 4 days  Code Status: Full Code   Attending Provider: Joon Romero DO   Primary Care Physician: Flynn Delarosa MD  Principal Problem:New onset a-fib    Patient information was obtained from patient, past medical records, and ER records.         Subjective:     Chief Complaint:  Fever, altered mental status     HPI:   This is a 63-year-old male who presented to the emergency department with a 3 day history of fever and altered mental status.  It was noted he had a recent cellulitis infection.  He was tachypneic, tachycardic and febrile upon arrival.  He was started on broad-spectrum IV antibiotics and continuous BiPAP.  His labs were notable for elevated pro BNP 4573, PT INR 23/2.6, positive blood cultures for Streptococcus species.    Patient follows with Dr. Ortiz.  He has cardiac history of s/p mechanical aortic valve replacement, ascending aorta tube graft, hypertension, hyperlipidemia, and a history of group B Streptococcus sepsis.  He was last seen in clinic September 2023 and was noted to be doing well at that time.    He was admitted to internal medicine with sepsis/bacteremia, and cis has been consulted.  Overnight, he remains tachycardic with a max heart rate of 187, average 160s.  He received 50 total mg of IV diltiazem as well as diltiazem p.o. 30 mg every 6 hours.  He also received a total of 15 mg IV Lopressor.  His heart rate has continued to be elevated, currently 120s to 140s.  He is seen on continuous BiPAP in moderate distress, and can not answer questions thoroughly.  Majority of history is obtained from family at bedside and past medical records.    EKG this morning reveals atrial fibrillation with rapid ventricular response, this is a new diagnosis of patient has no history of AFib.  He is currently on warfarin for anticoagulation after  mechanical valve replacement.  On exam today, heart rate is irregular and tachycardic.  He does also have 3+ swelling to bilateral lower extremities.  He received 1 dose of 40 mg IV Lasix.  He has an echocardiogram ordered pending results.    11/15/2023:   Yesterday, patient was transferred to ICU for close monitoring and care.  He was started on diltiazem gtt, and initially rate improved.  However, he reverted to RVR, and was subsequently started on IV amiodarone.  His rate continues to improve however sustaining in the 110s.  His blood pressure is stable, systolic and 120s to 130s.  His respiratory status has improved, and is currently oxygenating well on nasal cannula.  Overall, he has improved since yesterday and appears comfortable resting in the hospital bed.  He continues on IV antibiotics, diltiazem p.o., warfarin.  PT/INR remains within the goal range, white blood cell count has improved.  Echocardiogram resulted in atrial fibrillation RVR with an estimated EF of 55-70%.  There was no valvular vegetation noted, and the mechanical valve appears to be functioning well.  He denies any active chest pain, worsening shortness of breath, lightheadedness, palpitations.  He states he feels his breathing has greatly improved since he was admitted.    11/16/23:  No acute events overnight. He states his breathing has stayed about the same, minimally improved since yesterday. He is currently on 4L NC, and intermittently uses BiPAP. Yesterday, his HR increased again, so IV amio 1mg was restarted for 18 hours. His rate during sleeping hours is 80s, however during awake hours is 120s. We will add metoprolol to his medication regimen for additional rate control. However, we anticipate rate will remain somewhat elevated above goal while patient is fighting infection.     He remains afebrile, no new murmurs, and no new elevation in WBC.     Past Medical History:   Diagnosis Date    Aortic aneurysm     Hypertension        Past  Surgical History:   Procedure Laterality Date    APPENDECTOMY      CARDIAC SURGERY  2013    CHOLECYSTECTOMY      NECK SURGERY         Review of patient's allergies indicates:  No Known Allergies    No current facility-administered medications on file prior to encounter.     Current Outpatient Medications on File Prior to Encounter   Medication Sig    aspirin (ECOTRIN) 81 MG EC tablet Take 81 mg by mouth once daily.    [DISCONTINUED] amLODIPine (NORVASC) 5 MG tablet Take 5 mg by mouth once daily.    [DISCONTINUED] docusate sodium (COLACE) 100 MG capsule Take 100 mg by mouth 2 (two) times daily.    [DISCONTINUED] fenofibric acid (FIBRICOR) 135 mg CpDR Take 135 mg by mouth once daily.    [DISCONTINUED] tadalafiL (CIALIS) 5 MG tablet Take 5 mg by mouth every evening.    [DISCONTINUED] vitamin D 1000 units Tab Take 5,000 Units by mouth once daily.    [DISCONTINUED] warfarin (COUMADIN) 6 MG tablet Take 4 mg by mouth Daily. 4 mg every Tuesday and Thursday. 8 mg on Sunday, Monday, Wednesday, Friday, and Saturday     Family History    None       Tobacco Use    Smoking status: Former    Smokeless tobacco: Not on file   Substance and Sexual Activity    Alcohol use: Not on file    Drug use: Not on file    Sexual activity: Not on file     Review of Systems   Constitutional: Negative for fever.   Cardiovascular:  Negative for chest pain, dyspnea on exertion, near-syncope and palpitations.   Respiratory:  Positive for cough, shortness of breath and sputum production.    Neurological:  Negative for light-headedness and weakness.   All other systems reviewed and are negative.    Objective:     Vital Signs (Most Recent):  Temp: 97.8 °F (36.6 °C) (11/16/23 1115)  Pulse: (!) 114 (11/16/23 1600)  Resp: (!) 22 (11/16/23 1600)  BP: 120/72 (11/16/23 1600)  SpO2: 98 % (11/16/23 1600) Vital Signs (24h Range):  Temp:  [96.5 °F (35.8 °C)-97.8 °F (36.6 °C)] 97.8 °F (36.6 °C)  Pulse:  [] 114  Resp:  [20-34] 22  SpO2:  [95 %-99 %] 98 %  BP:  ()/(55-82) 120/72     Weight: (!) 137.6 kg (303 lb 6.4 oz)  Body mass index is 38.95 kg/m².    SpO2: 98 %         Intake/Output Summary (Last 24 hours) at 11/16/2023 1639  Last data filed at 11/16/2023 1200  Gross per 24 hour   Intake 1464.34 ml   Output 3540 ml   Net -2075.66 ml       Lines/Drains/Airways       Drain  Duration             Male External Urinary Catheter 11/14/23 0930 Other (Comment) 2 days              Peripheral Intravenous Line  Duration                  Peripheral IV - Single Lumen 11/12/23 0823 18 G Right Antecubital 4 days         Midline Catheter Insertion/Assessment  - Single Lumen 11/14/23 1454 Left basilic vein (medial side of arm) 2 days         Peripheral IV - Single Lumen 11/14/23 1030 18 G Left Antecubital 2 days                    Physical Exam  Vitals and nursing note reviewed.   Constitutional:       Appearance: Normal appearance.   HENT:      Head: Normocephalic and atraumatic.      Mouth/Throat:      Mouth: Mucous membranes are moist.      Pharynx: Oropharynx is clear.   Eyes:      Extraocular Movements: Extraocular movements intact.   Cardiovascular:      Rate and Rhythm: Tachycardia present. Rhythm irregular.      Heart sounds:      No friction rub. No gallop.      Comments: Noted mechanical valve clicking on auscultation  Pulmonary:      Breath sounds: Wheezing present.      Comments: On 4L NC, BiPAP machine at bedside for as needed.  Mild respiratory distress, improved since admission  Musculoskeletal:      Right lower leg: Edema present.      Left lower leg: Edema present.      Comments: Edema improved, trace to 1+ bilaterally   Skin:     General: Skin is warm.      Capillary Refill: Capillary refill takes less than 2 seconds.   Neurological:      General: No focal deficit present.      Mental Status: He is alert.      Comments: Awake, alert, answers questions appropriately         Significant Labs: BMP:   Recent Labs   Lab 11/15/23  0339 11/16/23  0433   * 182*  "   138   K 3.5 4.9    109   CO2 27 28   BUN 35* 20   CREATININE 1.1 1.0   CALCIUM 8.2* 7.9*   , CMP   Recent Labs   Lab 11/15/23  0339 11/16/23 0433    138   K 3.5 4.9    109   CO2 27 28   * 182*   BUN 35* 20   CREATININE 1.1 1.0   CALCIUM 8.2* 7.9*   PROT 6.5 6.4   ALBUMIN 2.9* 2.5*   BILITOT 2.2* 1.6*   ALKPHOS 67 104   AST 61* 106*   ALT 55* 60*   ANIONGAP 4 1*   , CBC   Recent Labs   Lab 11/15/23  0339 11/16/23 0433   WBC 9.45 9.94   HGB 11.7* 11.9*   HCT 33.8* 34.1*   PLT 71* 91*   , Troponin No results for input(s): "TROPONINI" in the last 48 hours., and All pertinent lab results from the last 24 hours have been reviewed.    Significant Imaging:     TTE 11/14/2023:      Atrial fibrillation with rvr is noted during this study.    Left Ventricle: The left ventricle is normal in size. Normal wall thickness. Normal wall motion. There is normal systolic function with a visually estimated ejection fraction of 55 - 70%.    Right Ventricle: Normal right ventricular cavity size. Systolic function is normal.    Left Atrium: Left atrium is mildly dilated.    Aortic Valve: There is a mechanical valve in the aortic position that is well-seated.    No pericardial effusion.    Echocardiogram 04/03/2023:  The study quality is below average.   The left ventricle is mildly enlarged. Left ventricular diastolic dimension is 6.2 cms. The left ventricular ejection fraction is 55-60%. Global left ventricular systolic function is normal. Left ventricular diastolic function is normal. Noted left ventricular hypertrophy. It is mild.   The left atrial diameter is mildly increased. Left atrial diameter is 4.3 cms. The right atrium is mildly enlarged. The aortic root appears mildly dilated. Aortic root diameter is 4.0 cms.  Trace tricuspid regurgitation. Trace mitral regurgitation.  The pulmonary artery systolic pressure is 19 mmHg.   The prosthetic valve in the aortic position functions normally. A " metallic valve is noted at the level of the aortic valve. Aortic valve area continuity equation is 1.7 cm². The trans-aortic peak velocity is 2.2 m/s. The trans-aortic peak gradient is 19.9 mmHg. The trans-aortic mean gradient is 11.0 mmHg. LVOT Diameter is 1.9 cms.     Exercise treadmill test 04/12/2023:  Stress EKG is normal.   Maximal exercise treadmill test (MPHR : 105 %).  The functional capacity is good (10.3 METs).  The heart rate recovery is normal.   The study quality is good.     Assessment and Plan:    New onset atrial fibrillation, rapid ventricular response  -EKG 12 lead revealing atrial fibrillation with   -received a total of 50 mg IV diltiazem, currently p.o. diltiazem 30 mg q6h  -Finished IV amiodarone 1mg load today, continue po   -Start metoprolol succinate 25 mg po qd for additional rate control   -currently on warfarin for history of mechanical valve, PT/INR within range 23/2.6   **HR remains elevated and is likely contributing to his shortness of breath. Plan for DCCV tomorrow at [facility]    Elevated BNP, resolved   -pro BNP 4573 on arrival  -improvement in lower extremity swelling and respiratory status  -received 40 mg IV Lasix x1  -BUN/Cr stable  -echocardiogram with EF estimated 55-70%    Acute respiratory distress, improved  -currently on nasal cannula 4L, with BiPAP as needed  -O2, 98%  -continue management per primary    Sepsis, improving  Bacteremia, improving  LEEANN, improving  -remains afebrile, no new cardiac murmurs  -Leukocytosis improved, WBC 9.94  -blood culture result Streptococcus group G, IV antibiotic regimen transitioned to ceftriaxone  -GISELE tomorrow at [facility] to evaluate for any evidence of endocarditis    S/p mechanical aortic valve replacement  -stable, continue warfarin  -PT/INR 23/2.6, increased today 32.9/3.6  -echocardiogram without evidence of valvular vegetation  -No new cardiac murmurs, infection continues to improve  -GISELE to evaluate mechanical valve      Hypertension   -blood pressures at goal, systolics 120s to 130s  -currently on BB, CCB  -continue to monitor blood pressure    Hyperlipidemia     S/P ascending aorta tube graft    H/o group B Streptococcus sepsis    **Disposition: Transfer to [facility] for a higher level of care to include pulmonology, infectious disease and for GISELE/DCCV     Active Diagnoses:    Diagnosis Date Noted POA    PRINCIPAL PROBLEM:  New onset a-fib [I48.91] 11/14/2023 No    Bacteremia [R78.81] 11/13/2023 Yes    LEEANN (acute kidney injury) [N17.9] 11/13/2023 Yes    Thrombocytopenia [D69.6] 11/13/2023 Yes    Severe sepsis [A41.9, R65.20] 08/30/2018 Yes    Mechanical heart valve present [Z95.2] 08/30/2018 Not Applicable      Problems Resolved During this Admission:    Diagnosis Date Noted Date Resolved POA    Fever [R50.9] 11/13/2023 11/16/2023 Yes    Encephalopathy, metabolic [G93.41] 11/13/2023 11/16/2023 Yes    Elevated brain natriuretic peptide (BNP) level [R79.89] 11/13/2023 11/16/2023 Yes       VTE Risk Mitigation (From admission, onward)           Ordered     warfarin split tablet 4 mg  Every Tues, Thurs        See Hyperspace for full Linked Orders Report.    11/12/23 1433     warfarin split tablet 8 mg  Once per day on Sun Mon Wed Fri Sat        See Hyperspace for full Linked Orders Report.    11/12/23 1433     IP VTE HIGH RISK PATIENT  Once         11/12/23 1312     Place sequential compression device  Until discontinued         11/12/23 1312     Place AINSLEY hose  Until discontinued         11/12/23 1312                    Thank you for your consult. I will follow-up with patient. Please contact us if you have any additional questions.    Ashley Herrera PA-C  Cardiology     I have personally interviewed and examined this patient face-to-face. As the physician, I have documented the above plan and rendered all medical decision making for this encounter. I have read and agree with the above documentation unless otherwise noted.     Antonio MAYNARD  MD Diana  Cardiology   [facility]

## 2023-11-16 NOTE — EICU
Intervention Initiated From:  COR / SEAN Dorsey intervened regarding:  Rounding (Video assessment)

## 2023-11-16 NOTE — ASSESSMENT & PLAN NOTE
Patient with acute kidney injury/acute renal failure likely due to pre-renal azotemia due to dehydration LEEANN is currently stable. Baseline creatinine  0.91  - Labs reviewed- Renal function/electrolytes with Estimated Creatinine Clearance: 114.6 mL/min (based on SCr of 1 mg/dL). according to latest data. Monitor urine output and serial BMP and adjust therapy as needed. Avoid nephrotoxins and renally dose meds for GFR listed above.    11/14 improving, continue to trend renal function  11/15 renal function back to baseline

## 2023-11-16 NOTE — ASSESSMENT & PLAN NOTE
Patient with Paroxysmal (<7 days) atrial fibrillation which is uncontrolled currently with Calcium Channel Blocker. Patient is currently in atrial fibrillation. Anticoagulation indicated. Anticoagulation done with warfarin in therapeutic range  .  11/15 On amiodarone gtt rate range 70-130s. Telemetry tracings atrial fibrillation. SpO2 >97% on continuous O2 4L via nasal cannula  - overnight started PO amiodarone with continue gtt loading, rate 80-120s  - f/u cardiology recommendations

## 2023-11-16 NOTE — ASSESSMENT & PLAN NOTE
This patient does have evidence of infective focus  My overall impression is sepsis.  Source: Respiratory and bacteremia  Antibiotics given-   Antibiotics (72h ago, onward)      Start     Stop Route Frequency Ordered    11/14/23 1100  mupirocin 2 % ointment         11/19/23 0859 Nasl 2 times daily 11/14/23 0952    11/14/23 0900  cefTRIAXone (ROCEPHIN) 2 g in dextrose 5 % in water (D5W) 100 mL IVPB (MB+)         -- IV Every 24 hours (non-standard times) 11/13/23 1119          Latest lactate reviewed-  Recent Labs   Lab 11/13/23  1621 11/13/23 2012   LACTATE 3.7* 4.6*       Organ dysfunction indicated by Acute kidney injury, Acute respiratory failure, Acute heart failure, and Encephalopathy    Fluid challenge Ideal Body Weight- The patient's ideal body weight is Ideal body weight: 82.2 kg (181 lb 3.5 oz) which will be used to calculate fluid bolus of 30 ml/kg for treatment of septic shock.      Post- resuscitation assessment Yes Perfusion exam was performed within 6 hours of septic shock presentation after bolus shows Adequate tissue perfusion assessed by non-invasive monitoring     Will Not start Pressors- Levophed for MAP of 65  Source control achieved by: IVF boluses and IV antibiotics    11/16 Renal function back to baseline. LFTs down trending. Respiratory status improving with rate control status improving for new onset A fib RVR, Echocardiogram with no findings of vegetation. Continue with Rocephin for Group G strep bacteremia treatment.

## 2023-11-16 NOTE — PT/OT/SLP PROGRESS
"Physical Therapy Treatment    Patient Name:  Chapo Rosario   MRN:  [id number]    Recommendations:     Discharge Recommendations: Low Intensity Therapy  Discharge Equipment Recommendations: none  Barriers to discharge: None    Assessment:     Chapo Rosario is a 63 y.o. male admitted with a medical diagnosis of Bacteremia.  He presents with the following impairments/functional limitations: weakness, gait instability, impaired cardiopulmonary response to activity, impaired endurance, impaired balance, decreased lower extremity function, impaired joint extensibility, decreased safety awareness, impaired muscle length, impaired self care skills, impaired functional mobility Patient is still in ICU, on Amiodarone drip.  HR ranges from 111-135 bpm throughout the entire therapy session, denies chest pain. , SPO2 was 97% and BP was 161/111 mmHg.  Patient tolerated sitting at edge of bed and standing with RW.  Unable to progress to ambulation due to tachycardia and  high BP.  Nurse Radha Mai RN present during treatment.     Rehab Prognosis: Fair; patient would benefit from acute skilled PT services to address these deficits and reach maximum level of function.    Recent Surgery: * No surgery found *      Plan:     During this hospitalization, patient to be seen 5 x/week to address the identified rehab impairments via gait training, therapeutic activities, therapeutic exercises, neuromuscular re-education and progress toward the following goals:    Plan of Care Expires:  11/22/23    Subjective     Chief Complaint: "I wish I can stand up."   Patient/Family Comments/goals: Get better.   Pain/Comfort:  Pain Rating 1: 0/10  Pain Rating Post-Intervention 1: 0/10      Objective:     Communicated with nurse and patient  prior to session.  Patient found supine with telemetry, peripheral IV, oxygen, pulse ox (continuous), blood pressure cuff upon PT entry to room.     General Precautions: Standard, fall (tachycardia)  Orthopedic " Precautions: N/A  Braces: N/A  Respiratory Status: Nasal cannula, flow 4 L/min     Functional Mobility:  Bed Mobility:     Rolling Right: contact guard assistance  Scooting: contact guard assistance  Supine to Sit: contact guard assistance and minimum assistance  Transfers:     Sit to Stand:  contact guard assistance with rolling walker  Gait: 2 feet x 2 trials, side stepping by the bed with RW CGA   Balance: Independent with static sitting, CGA with static standing using a RW      AM-PAC 6 CLICK MOBILITY  Turning over in bed (including adjusting bedclothes, sheets and blankets)?: 3  Sitting down on and standing up from a chair with arms (e.g., wheelchair, bedside commode, etc.): 3  Moving from lying on back to sitting on the side of the bed?: 3  Moving to and from a bed to a chair (including a wheelchair)?: 3  Need to walk in hospital room?: 1 (tachycardia)  Climbing 3-5 steps with a railing?: 1 (tachycardia)  Basic Mobility Total Score: 14       Treatment & Education:  Rolling  to right side  Supine > sit  Scooting to the edge  of the bed   Sitting balance/tolerance  Sit <> stand with RW  Standing balance/tolerance   Side stepping by the bed          Seated   Both Lower Extremity   Active ROM Sets / Reps / Resistance / Etc.   LAQ As tolerated    Ankle DF/PF           As tolerated          Patient left sitting edge of bed with all lines intact, call button in reach, and nurse  notified..    GOALS:   Multidisciplinary Problems       Physical Therapy Goals          Problem: Physical Therapy    Goal Priority Disciplines Outcome Goal Variances Interventions   Physical Therapy Goal     PT, PT/OT Ongoing, Progressing     Description: Goals to be met by: 2023     Patient will increase functional independence with mobility by performin. Supine to sit with Modified Independent.  2. Sit to supine with Modified Independent.  3. Bed to chair transfer with Modified Independent with proper A.D.  using Step Transfer  technique.  4. Sit to Stand with Modified Independent with rolling walker.  5. Gait  x 150   feet with Modified Independent with proper A.D. .  6. Lower extremity exercise program x10 reps.                           Time Tracking:     PT Received On: 11/16/23  PT Start Time: 1019     PT Stop Time: 1042  PT Total Time (min): 23 min     Billable Minutes: Therapeutic Activity 13 and Therapeutic Exercise 10    Treatment Type: Treatment  PT/PTA: PT     Number of PTA visits since last PT visit: 0     11/16/2023

## 2023-11-16 NOTE — NURSING
Spoke with Jayshree, house supervisor at [facility] to check on bed status. Reports nurses in meeting and will call back when ready. Will go to Room 1605

## 2023-11-16 NOTE — ASSESSMENT & PLAN NOTE
Patient was found to have thrombocytopenia, the likely etiology is secondary to sepsis/infection, will monitor the platelets Daily. Will transfuse if platelet count is <20k. Hold DVT prophylaxis if platelets are <50k. The patient's platelet results have been reviewed and are listed below.  Recent Labs   Lab 11/16/23  0433   PLT 91*     Stable. Continue to monitor.

## 2023-11-16 NOTE — ASSESSMENT & PLAN NOTE
Patient with Paroxysmal (<7 days) atrial fibrillation which is uncontrolled currently with Calcium Channel Blocker. Patient is currently in atrial fibrillation. Anticoagulation indicated. Anticoagulation done with warfarin in therapeutic range  .  11/15 On amiodarone gtt rate range 70-130s. Telemetry tracings atrial fibrillation. SpO2 >97% on continuous O2 4L via nasal cannula  - overnight started PO amiodarone with continue gtt loading, rate 80-120s  - f/u cardiology recommendations  11/16 Transitioned from IV to PO amiodarone. HR increasing >120s. Overall patient presents with generalized weakness and exhaustion, no overt symptoms. Afebrile. Oxygen requirements have improved since time of admission to continuous O2 4L via nasal cannula.   Requesting transfer to [facility] for continued services including cardioversion and GISELE with cardiology services.  Primary cardiologist, Dr. Ortiz.

## 2023-11-16 NOTE — SUBJECTIVE & OBJECTIVE
Interval History: Patient seen and examined.     Review of Systems   Constitutional:  Positive for activity change. Negative for appetite change, chills and fever.   HENT:  Negative for sore throat.    Respiratory:  Negative for cough, chest tightness, shortness of breath and wheezing.    Cardiovascular:  Negative for chest pain, palpitations and leg swelling.   Gastrointestinal:  Negative for abdominal distention, abdominal pain, blood in stool, constipation, diarrhea, nausea and vomiting.   Musculoskeletal:  Negative for back pain, joint swelling, neck pain and neck stiffness.   Skin:  Negative for pallor and wound.   Neurological:  Positive for weakness. Negative for dizziness, seizures, syncope, speech difficulty, light-headedness and numbness.   Psychiatric/Behavioral:  Negative for agitation, behavioral problems, dysphoric mood and sleep disturbance. The patient is not nervous/anxious and is not hyperactive.      Objective:     Vital Signs (Most Recent):  Temp: 97.8 °F (36.6 °C) (11/16/23 1115)  Pulse: 79 (11/16/23 1114)  Resp: 20 (11/16/23 1114)  BP: (!) 157/56 (11/16/23 0933)  SpO2: 97 % (11/16/23 1114) Vital Signs (24h Range):  Temp:  [96.5 °F (35.8 °C)-98 °F (36.7 °C)] 97.8 °F (36.6 °C)  Pulse:  [] 79  Resp:  [20-34] 20  SpO2:  [95 %-99 %] 97 %  BP: ()/(55-82) 157/56     Weight: (!) 137.6 kg (303 lb 6.4 oz)  Body mass index is 38.95 kg/m².    Intake/Output Summary (Last 24 hours) at 11/16/2023 1216  Last data filed at 11/16/2023 0549  Gross per 24 hour   Intake 1819.34 ml   Output 3140 ml   Net -1320.66 ml         Physical Exam  Vitals and nursing note reviewed. Exam conducted with a chaperone present.   Constitutional:       General: He is not in acute distress.     Appearance: He is obese. He is ill-appearing. He is not toxic-appearing or diaphoretic.   HENT:      Right Ear: External ear normal.      Left Ear: External ear normal.      Nose: No congestion or rhinorrhea.      Mouth/Throat:       Mouth: Mucous membranes are dry.      Pharynx: Oropharynx is clear. No oropharyngeal exudate or posterior oropharyngeal erythema.   Eyes:      Extraocular Movements: Extraocular movements intact.      Conjunctiva/sclera: Conjunctivae normal.   Cardiovascular:      Rate and Rhythm: Normal rate. Rhythm irregular.   Pulmonary:      Breath sounds: No wheezing or rhonchi.      Comments: BiPAP FiO2 30%  Chest:      Chest wall: No tenderness.   Abdominal:      General: There is no distension.      Palpations: Abdomen is soft.      Tenderness: There is no abdominal tenderness. There is no right CVA tenderness, left CVA tenderness or guarding.      Comments: protuberant   Musculoskeletal:         General: No swelling.      Cervical back: Normal range of motion and neck supple. No rigidity or tenderness.      Right lower leg: No edema (trace bilaterally).      Left lower leg: No edema.   Lymphadenopathy:      Cervical: No cervical adenopathy.   Skin:     General: Skin is warm and dry.      Capillary Refill: Capillary refill takes less than 2 seconds.      Comments: Left first toe with black and blue bruise like hyperpigmentation at nail bed, non tender, no ulceration of skin nearby   Neurological:      General: No focal deficit present.      Mental Status: He is alert and oriented to person, place, and time. Mental status is at baseline.      Cranial Nerves: No cranial nerve deficit.   Psychiatric:         Mood and Affect: Mood normal.         Behavior: Behavior normal.         Thought Content: Thought content normal.           Significant Labs: All pertinent labs within the past 24 hours have been reviewed.  Bilirubin:   Recent Labs   Lab 11/12/23  0837 11/13/23  0526 11/14/23  0505 11/15/23  0339 11/16/23  0433   BILITOT 3.0* 4.3* 3.5* 2.2* 1.6*     BMP:   Recent Labs   Lab 11/16/23  0433   *      K 4.9      CO2 28   BUN 20   CREATININE 1.0   CALCIUM 7.9*     CBC:   Recent Labs   Lab 11/15/23  4351  11/16/23 0433   WBC 9.45 9.94   HGB 11.7* 11.9*   HCT 33.8* 34.1*   PLT 71* 91*     CMP:   Recent Labs   Lab 11/15/23  0339 11/16/23 0433    138   K 3.5 4.9    109   CO2 27 28   * 182*   BUN 35* 20   CREATININE 1.1 1.0   CALCIUM 8.2* 7.9*   PROT 6.5 6.4   ALBUMIN 2.9* 2.5*   BILITOT 2.2* 1.6*   ALKPHOS 67 104   AST 61* 106*   ALT 55* 60*   ANIONGAP 4 1*     Coagulation:   Recent Labs   Lab 11/16/23 0433   INR 3.6*   Results for orders placed during the hospital encounter of 11/12/23    Echo    Interpretation Summary    Atrial fibrillation with rvr is noted during this study.    Left Ventricle: The left ventricle is normal in size. Normal wall thickness. Normal wall motion. There is normal systolic function with a visually estimated ejection fraction of 55 - 70%.    Right Ventricle: Normal right ventricular cavity size. Systolic function is normal.    Left Atrium: Left atrium is mildly dilated.    Aortic Valve: There is a mechanical valve in the aortic position that is well-seated.    No pericardial effusion.    Significant Imaging: I have reviewed all pertinent imaging results/findings within the past 24 hours.

## 2023-11-16 NOTE — PLAN OF CARE
Problem: Physical Therapy  Goal: Physical Therapy Goal  Description: Goals to be met by: 2023     Patient will increase functional independence with mobility by performin. Supine to sit with Modified Independent.  2. Sit to supine with Modified Independent.  3. Bed to chair transfer with Modified Independent with proper A.D.  using Step Transfer technique.  4. Sit to Stand with Modified Independent with rolling walker.  5. Gait  x 150   feet with Modified Independent with proper A.D. .  6. Lower extremity exercise program x10 reps.      Outcome: Ongoing, Progressing

## 2023-11-16 NOTE — PLAN OF CARE
POC reviewed with pt, reiterated education concerning SOB from pneumonia, using the BiPAP as needed, educated pt on the importance of mobility to keep his muscle strength, IV sites clean/dry/intact-SL, Midline to ADAN clean/dry/intact with amiodarone 360 mg infusing at 33.3 ml/hr, with HR ranging from 70's to 130's pt states he is feeling better than he was on admit, denies pain/needs at this time, will continue to monitor.       Problem: Adult Inpatient Plan of Care  Goal: Plan of Care Review  Outcome: Ongoing, Progressing  Goal: Patient-Specific Goal (Individualized)  Outcome: Ongoing, Progressing  Goal: Absence of Hospital-Acquired Illness or Injury  Outcome: Ongoing, Progressing  Goal: Optimal Comfort and Wellbeing  Outcome: Ongoing, Progressing  Goal: Readiness for Transition of Care  Outcome: Ongoing, Progressing     Problem: Adjustment to Illness (Sepsis/Septic Shock)  Goal: Optimal Coping  Outcome: Ongoing, Progressing     Problem: Bleeding (Sepsis/Septic Shock)  Goal: Absence of Bleeding  Outcome: Ongoing, Progressing     Problem: Glycemic Control Impaired (Sepsis/Septic Shock)  Goal: Blood Glucose Level Within Desired Range  Outcome: Ongoing, Progressing     Problem: Infection Progression (Sepsis/Septic Shock)  Goal: Absence of Infection Signs and Symptoms  Outcome: Ongoing, Progressing     Problem: Nutrition Impaired (Sepsis/Septic Shock)  Goal: Optimal Nutrition Intake  Outcome: Ongoing, Progressing     Problem: Fall Injury Risk  Goal: Absence of Fall and Fall-Related Injury  Outcome: Ongoing, Progressing     Problem: Fluid and Electrolyte Imbalance (Acute Kidney Injury/Impairment)  Goal: Fluid and Electrolyte Balance  Outcome: Ongoing, Progressing     Problem: Oral Intake Inadequate (Acute Kidney Injury/Impairment)  Goal: Optimal Nutrition Intake  Outcome: Ongoing, Progressing     Problem: Renal Function Impairment (Acute Kidney Injury/Impairment)  Goal: Effective Renal Function  Outcome: Ongoing,  Progressing     Problem: Infection  Goal: Absence of Infection Signs and Symptoms  Outcome: Ongoing, Progressing     Problem: Skin Injury Risk Increased  Goal: Skin Health and Integrity  Outcome: Ongoing, Progressing     Problem: Breathing Pattern Ineffective  Goal: Effective Breathing Pattern  Outcome: Ongoing, Progressing

## 2023-11-16 NOTE — EICU
Intervention Initiated From:  [facility] / EICU    Willian intervened regarding:  Rounding (Video assessment)  Comments: Video rounds done.  Lying in bed,  no acute distress noted. Bedside monitor showing Afib , /67, RR 24, POx 97% on 4 L/min nasal cannula. On amiodarone IV drip @ 1mg/min.

## 2023-11-16 NOTE — ASSESSMENT & PLAN NOTE
Lab Results   Component Value Date    INR 3.6 (H) 11/16/2023    INR 2.6 (H) 11/15/2023    INR 2.6 (H) 11/14/2023   Hold warfarin dose x1 day, recheck INR and morning.

## 2023-11-16 NOTE — PT/OT/SLP EVAL
Occupational Therapy   Evaluation    Name: Chapo Rosario  MRN: [id number]  Admitting Diagnosis: Bacteremia  Recent Surgery: * No surgery found *      Recommendations:     Discharge Recommendations: Low Intensity Therapy  Discharge Equipment Recommendations:   (To be further determined based on progress prior to discharge.)  Barriers to discharge:  Other (Comment) (Medical status)    Assessment:     Chapo Rosario is a 63 y.o. male with a medical diagnosis of Bacteremia.  He presents with functional deficits impacting independence with ADL's including functional mobility. Performance deficits affecting function: weakness, impaired endurance, impaired self care skills, impaired functional mobility, impaired balance, decreased upper extremity function, decreased lower extremity function, decreased safety awareness, decreased ROM, edema, impaired cardiopulmonary response to activity, impaired joint extensibility, impaired muscle length.      Rehab Prognosis: Good; patient would benefit from acute skilled OT services to address these deficits and reach maximum level of function.       Plan:     Patient to be seen 5 x/week to address the above listed problems via    Plan of Care Expires: 11/23/23  Plan of Care Reviewed with: patient    Subjective     Chief Complaint: lack of activity   Patient/Family Comments/goals: Pt would like to regain independence in order to return home with significant other.     Occupational Profile:  Living Environment: Pt lives with her mother and girlfriend in a [facility] without steps.  Previous level of function: Independent  Roles and Routines: ADL's and IADL's  Equipment Used at Home: none  Assistance upon Discharge: Pt's girlfriend can assist if needed.     Pain/Comfort:  Pain Rating 1: 0/10  Pain Rating Post-Intervention 1: 0/10    Patients cultural, spiritual, Yarsanism conflicts given the current situation: no    Objective:     Communicated with: nurse prior to session.  Patient found HOB  elevated with telemetry, pulse ox (continuous), peripheral IV, oxygen, blood pressure cuff upon OT entry to room.    General Precautions: Standard, fall (cardiac)  Orthopedic Precautions: N/A  Braces: N/A  Respiratory Status: Nasal cannula, flow 4 L/min    Occupational Performance:    Bed Mobility:    Patient completed Rolling/Turning to Left with  stand by assistance  Patient completed Rolling/Turning to Right with stand by assistance  Patient completed Scooting/Bridging with stand by assistance  Patient completed Supine to Sit with minimum assistance  Patient completed Sit to Supine with minimum assistance    Functional Mobility/Transfers:  Patient completed Sit <> Stand Transfer with minimum assistance  with  standard walker   Patient completed Bed <> Chair Transfer using Step Transfer technique with minimum assistance with standard walker  Patient completed Toilet Transfer Step Transfer technique with minimum assistance with  standard walker  Functional Mobility: Pt ambulated 8' requiring steadying assist utilizing RW.    Activities of Daily Living:  Feeding:  modified independence    Grooming: setup assistance    Bathing: minimum assistance    Upper Body Dressing: minimum assistance    Lower Body Dressing: moderate assistance    Toileting: minimum assistance      Cognitive/Visual Perceptual:  Cognitive/Psychosocial Skills:  -       Oriented to: Person, Place, and Situation   -       Follows Commands/attention:Follows multistep  commands  -       Communication: clear/fluent  -       Memory: No Deficits noted  -       Safety awareness/insight to disability: intact   -       Mood/Affect/Coping skills/emotional control: Cooperative and Pleasant    Physical Exam:  Postural examination/scapula alignment: -       Rounded shoulders  -       Forward head  Edema:  bilateral Ue's and LE's  Sensation: -       Intact  light/touch bilateral UE's  Dominant hand: -       Right  Upper Extremity Range of Motion:  -       Right  Upper Extremity: WFL  -       Left Upper Extremity: WFL  Upper Extremity Strength: -       Right Upper Extremity: 4-/5 shoulder; 4/5 elbow, forearm, and wrist  -       Left Upper Extremity: 4-/5 shoulder; 4/5 elbow, forearm, and wrist  Fine Motor Coordination: -       Impaired  Left hand, manipulation of objects impacted by edema  Gross motor coordination: WFL    AMPAC 6 Click ADL:  AMPAC Total Score: 20    Treatment & Education:  Pt was provided education / instruction regarding role of OT and established OT POC.    Patient left sitting edge of bed with all lines intact, call button in reach, and nurse notified    GOALS:   Goals to be met by: 11/23/23     Patient will increase functional independence with ADLs by performing:    UE Dressing with Modified [place].  LE Dressing with Modified [place].  Grooming while standing at sink with Modified [place].  Toileting from toilet with Modified [place] for hygiene and clothing management.   Bathing from  shower chair/bench with Modified [place].  Toilet transfer to toilet with Modified [place].      History:     Past Medical History:   Diagnosis Date    Aortic aneurysm     Hypertension          Past Surgical History:   Procedure Laterality Date    APPENDECTOMY      CARDIAC SURGERY  2013    CHOLECYSTECTOMY      NECK SURGERY         Time Tracking:     OT Date of Treatment: 11/16/23  OT Start Time: 1114  OT Stop Time: 1145  OT Total Time (min): 31 min    Billable Minutes:Evaluation 31 11/16/2023

## 2023-11-16 NOTE — PLAN OF CARE
Problem: Occupational Therapy  Goal: Occupational Therapy Goal  Description: Goals to be met by: 11/23/23     Patient will increase functional independence with ADLs by performing:    UE Dressing with Modified [place].  LE Dressing with Modified [place].  Grooming while standing at sink with Modified [place].  Toileting from toilet with Modified [place] for hygiene and clothing management.   Bathing from  shower chair/bench with Modified [place].  Toilet transfer to toilet with Modified [place].    Outcome: Established OT POC

## 2023-11-16 NOTE — ASSESSMENT & PLAN NOTE
Patient was found to have thrombocytopenia, the likely etiology is secondary to sepsis/infection, will monitor the platelets Daily. Will transfuse if platelet count is <20k. Hold DVT prophylaxis if platelets are <50k. The patient's platelet results have been reviewed and are listed below.  Recent Labs   Lab 11/16/23  0433   PLT 91*     Recovering. Continue to monitor.

## 2023-11-16 NOTE — ASSESSMENT & PLAN NOTE
This patient does have evidence of infective focus  My overall impression is sepsis.  Source: Respiratory and bacteremia  Antibiotics given-   Antibiotics (72h ago, onward)      Start     Stop Route Frequency Ordered    11/14/23 1100  mupirocin 2 % ointment         11/19/23 0859 Nasl 2 times daily 11/14/23 0952    11/14/23 0900  cefTRIAXone (ROCEPHIN) 2 g in dextrose 5 % in water (D5W) 100 mL IVPB (MB+)         -- IV Every 24 hours (non-standard times) 11/13/23 1119          Latest lactate reviewed-  Recent Labs   Lab 11/13/23  1621 11/13/23 2012   LACTATE 3.7* 4.6*       Organ dysfunction indicated by Acute kidney injury, Acute respiratory failure, Acute heart failure, and Encephalopathy    Fluid challenge Ideal Body Weight- The patient's ideal body weight is Ideal body weight: 82.2 kg (181 lb 3.5 oz) which will be used to calculate fluid bolus of 30 ml/kg for treatment of septic shock.      Post- resuscitation assessment Yes Perfusion exam was performed within 6 hours of septic shock presentation after bolus shows Adequate tissue perfusion assessed by non-invasive monitoring     Will Not start Pressors- Levophed for MAP of 65  Source control achieved by: IVF boluses and IV antibiotics    11/16 Renal function back to baseline. LFTs down trending. Respiratory status improving with rate running 80-120s with new onset A fib RVR. Echocardiogram with no findings of vegetation. Given past bacteremia with Group B strep, may benefit from follow up GISELE to see blind spot created from mechanical aortic valve.   Patient has been afebrile and leukocytosis resolved since time of admission.   - continue with Rocephin for Group G strep bacteremia treatment

## 2023-11-16 NOTE — ASSESSMENT & PLAN NOTE
11/12 Blood Cx x2 growing strep spp. identified via PCR. Patient in 2018 hx of group B strep bacteremia.   Initial vanc zosyn x1 will change to rocephin per sensitivity and susceptibility study.   11/14 continue with Rocephin, sensitive to Group G strep  11/16 Echocardiogram with no evidence of vegetation. LVEF 55-70%. Continue course with total 14 days therapy. If infection persists, no improvement or condition worsens then GISELE recommended per cardiology  - D5 IV antibiotics, continue Rocephin  - continue supportive care

## 2023-11-16 NOTE — ASSESSMENT & PLAN NOTE
11/12 Blood Cx x2 growing strep spp. identified via PCR. Patient in 2018 hx of group B strep bacteremia.   Initial vanc zosyn x1 will change to rocephin per sensitivity and susceptibility study.   11/14 continue with Rocephin, sensitive to Group G strep  11/16 Echocardiogram with no evidence of vegetation. LVEF 55-70%. Continue course with total 14 days therapy. If infection persists, no improvement or condition worsens then IGSELE recommended per cardiology  - D5 IV antibiotics, continue Rocephin  - continue supportive care           Patient discharged home with infant. Prescriptions & instructions given. Verbalized understanding. All questions answered. No distress noted.  Signed copy of discharge instructions on paper chart.

## 2023-11-16 NOTE — ASSESSMENT & PLAN NOTE
Lab Results   Component Value Date    INR 3.6 (H) 11/16/2023    INR 2.6 (H) 11/15/2023    INR 2.6 (H) 11/14/2023   Hold warfarin dose x1 day

## 2023-11-17 PROBLEM — J96.01 ACUTE HYPOXEMIC RESPIRATORY FAILURE: Status: RESOLVED | Noted: 2023-11-16 | Resolved: 2023-11-17

## 2023-11-17 NOTE — NURSING
House supervisor, Mauricio notified of transfer. [place] [facility]

## 2023-11-17 NOTE — PLAN OF CARE
Problem: Physical Therapy  Goal: Physical Therapy Goal  Description: Goals to be met by: 2023     Patient will increase functional independence with mobility by performin. Supine to sit with Modified Independent.  2. Sit to supine with Modified Independent.  3. Bed to chair transfer with Modified Independent with proper A.D.  using Step Transfer technique.  4. Sit to Stand with Modified Independent with rolling walker.  5. Gait  x 150   feet with Modified Independent with proper A.D. .  6. Lower extremity exercise program x10 reps.      Outcome: Adequate for Care Transition, higher level of care

## 2023-11-17 NOTE — NURSING
C/o chest tightness from earlier. Reports feeling better at present. Instructed to call when that happens. Voiced understanding. No c/o blurred vision, numbness, N/V at present. Dr. Romero notified. Requesting a stool softener    no

## 2023-11-17 NOTE — PT/OT/SLP DISCHARGE
Physical Therapy Discharge Summary    Name: Chapo Rosario  MRN: [id number]   Principal Problem: New onset a-fib     Patient Discharged from acute Physical Therapy on 2023 .  Please refer to prior PT note dated  on 2023  for functional status.     Assessment:     Patient appropriate for care in another setting.    Objective:     GOALS:   Multidisciplinary Problems       Physical Therapy Goals          Problem: Physical Therapy    Goal Priority Disciplines Outcome Goal Variances Interventions   Physical Therapy Goal     PT, PT/OT Adequate for Care Transition     Description: Goals to be met by: 2023     Patient will increase functional independence with mobility by performin. Supine to sit with Modified Independent.  2. Sit to supine with Modified Independent.  3. Bed to chair transfer with Modified Independent with proper A.D.  using Step Transfer technique.  4. Sit to Stand with Modified Independent with rolling walker.  5. Gait  x 150   feet with Modified Independent with proper A.D. .  6. Lower extremity exercise program x10 reps.                           Reasons for Discontinuation of Therapy Services  Transfer to alternate level of care.      Plan:     Patient Discharged to:  Higher level of care for medical management  .      2023

## 2023-11-17 NOTE — PLAN OF CARE
Discharged via [facility] in stable condition. Reports belongings all belongings removed from room. Oxygen in use and dentures in place. Girlfriend at side.

## 2023-11-17 NOTE — NURSING
Dr. Diana barros. Discussed with patient possible transfer to [facility] [place] GISELE and cardioversion tomorrow. Voiced understanding. Girlfriend at side

## 2023-11-18 PROBLEM — B95.5 BACTEREMIA DUE TO STREPTOCOCCUS: Status: ACTIVE | Noted: 2018-08-30

## 2023-11-19 PROBLEM — R74.01 TRANSAMINITIS: Status: RESOLVED | Noted: 2023-11-16 | Resolved: 2023-11-19

## 2023-11-21 PROBLEM — B95.5 STREPTOCOCCAL BACTEREMIA: Status: ACTIVE | Noted: 2023-11-13

## 2023-11-21 LAB
BACTERIA BLD CULT: NORMAL
BACTERIA BLD CULT: NORMAL

## 2023-11-29 ENCOUNTER — LAB VISIT (OUTPATIENT)
Dept: LAB | Facility: HOSPITAL | Age: 63
End: 2023-11-29
Attending: FAMILY MEDICINE
Payer: COMMERCIAL

## 2023-11-29 DIAGNOSIS — B95.1 BACTEREMIA DUE TO GROUP B STREPTOCOCCUS: ICD-10-CM

## 2023-11-29 DIAGNOSIS — R78.81 BACTEREMIA DUE TO GROUP B STREPTOCOCCUS: ICD-10-CM

## 2023-11-29 LAB
CRP SERPL-MCNC: 3.97 MG/DL (ref 0–0.75)
ERYTHROCYTE [SEDIMENTATION RATE] IN BLOOD: 85 MM/HR (ref 0–10)

## 2023-11-29 PROCEDURE — 86140 C-REACTIVE PROTEIN: CPT | Performed by: FAMILY MEDICINE

## 2023-11-29 PROCEDURE — 36415 COLL VENOUS BLD VENIPUNCTURE: CPT | Performed by: FAMILY MEDICINE

## 2023-11-29 PROCEDURE — 85651 RBC SED RATE NONAUTOMATED: CPT | Performed by: FAMILY MEDICINE

## 2023-11-30 ENCOUNTER — OFFICE VISIT (OUTPATIENT)
Dept: PRIMARY CARE CLINIC | Facility: CLINIC | Age: 63
End: 2023-11-30
Payer: COMMERCIAL

## 2023-11-30 VITALS
HEART RATE: 85 BPM | WEIGHT: 277 LBS | BODY MASS INDEX: 35.55 KG/M2 | DIASTOLIC BLOOD PRESSURE: 64 MMHG | TEMPERATURE: 98 F | RESPIRATION RATE: 16 BRPM | SYSTOLIC BLOOD PRESSURE: 133 MMHG | OXYGEN SATURATION: 96 % | HEIGHT: 74 IN

## 2023-11-30 DIAGNOSIS — I48.91 NEW ONSET A-FIB: ICD-10-CM

## 2023-11-30 DIAGNOSIS — E66.01 CLASS 2 SEVERE OBESITY WITH SERIOUS COMORBIDITY AND BODY MASS INDEX (BMI) OF 35.0 TO 35.9 IN ADULT, UNSPECIFIED OBESITY TYPE: ICD-10-CM

## 2023-11-30 DIAGNOSIS — N17.9 AKI (ACUTE KIDNEY INJURY): ICD-10-CM

## 2023-11-30 DIAGNOSIS — D69.6 THROMBOCYTOPENIA: ICD-10-CM

## 2023-11-30 DIAGNOSIS — E78.5 HYPERLIPIDEMIA, UNSPECIFIED HYPERLIPIDEMIA TYPE: ICD-10-CM

## 2023-11-30 DIAGNOSIS — Z95.2 MECHANICAL HEART VALVE PRESENT: ICD-10-CM

## 2023-11-30 DIAGNOSIS — R78.81 STREPTOCOCCAL BACTEREMIA: Primary | ICD-10-CM

## 2023-11-30 DIAGNOSIS — I10 PRIMARY HYPERTENSION: ICD-10-CM

## 2023-11-30 DIAGNOSIS — B95.5 STREPTOCOCCAL BACTEREMIA: Primary | ICD-10-CM

## 2023-11-30 PROCEDURE — 3075F PR MOST RECENT SYSTOLIC BLOOD PRESS GE 130-139MM HG: ICD-10-PCS | Mod: CPTII,S$GLB,, | Performed by: STUDENT IN AN ORGANIZED HEALTH CARE EDUCATION/TRAINING PROGRAM

## 2023-11-30 PROCEDURE — 1111F PR DISCHARGE MEDS RECONCILED W/ CURRENT OUTPATIENT MED LIST: ICD-10-PCS | Mod: CPTII,S$GLB,, | Performed by: STUDENT IN AN ORGANIZED HEALTH CARE EDUCATION/TRAINING PROGRAM

## 2023-11-30 PROCEDURE — 3078F PR MOST RECENT DIASTOLIC BLOOD PRESSURE < 80 MM HG: ICD-10-PCS | Mod: CPTII,S$GLB,, | Performed by: STUDENT IN AN ORGANIZED HEALTH CARE EDUCATION/TRAINING PROGRAM

## 2023-11-30 PROCEDURE — 99213 PR OFFICE/OUTPT VISIT, EST, LEVL III, 20-29 MIN: ICD-10-PCS | Mod: S$GLB,,, | Performed by: STUDENT IN AN ORGANIZED HEALTH CARE EDUCATION/TRAINING PROGRAM

## 2023-11-30 PROCEDURE — 1160F RVW MEDS BY RX/DR IN RCRD: CPT | Mod: CPTII,S$GLB,, | Performed by: STUDENT IN AN ORGANIZED HEALTH CARE EDUCATION/TRAINING PROGRAM

## 2023-11-30 PROCEDURE — 99213 OFFICE O/P EST LOW 20 MIN: CPT | Mod: S$GLB,,, | Performed by: STUDENT IN AN ORGANIZED HEALTH CARE EDUCATION/TRAINING PROGRAM

## 2023-11-30 PROCEDURE — 1159F MED LIST DOCD IN RCRD: CPT | Mod: CPTII,S$GLB,, | Performed by: STUDENT IN AN ORGANIZED HEALTH CARE EDUCATION/TRAINING PROGRAM

## 2023-11-30 PROCEDURE — 1111F DSCHRG MED/CURRENT MED MERGE: CPT | Mod: CPTII,S$GLB,, | Performed by: STUDENT IN AN ORGANIZED HEALTH CARE EDUCATION/TRAINING PROGRAM

## 2023-11-30 PROCEDURE — 3044F HG A1C LEVEL LT 7.0%: CPT | Mod: CPTII,S$GLB,, | Performed by: STUDENT IN AN ORGANIZED HEALTH CARE EDUCATION/TRAINING PROGRAM

## 2023-11-30 PROCEDURE — 1160F PR REVIEW ALL MEDS BY PRESCRIBER/CLIN PHARMACIST DOCUMENTED: ICD-10-PCS | Mod: CPTII,S$GLB,, | Performed by: STUDENT IN AN ORGANIZED HEALTH CARE EDUCATION/TRAINING PROGRAM

## 2023-11-30 PROCEDURE — 99999 PR PBB SHADOW E&M-EST. PATIENT-LVL V: CPT | Mod: PBBFAC,,, | Performed by: STUDENT IN AN ORGANIZED HEALTH CARE EDUCATION/TRAINING PROGRAM

## 2023-11-30 PROCEDURE — 99999 PR PBB SHADOW E&M-EST. PATIENT-LVL V: ICD-10-PCS | Mod: PBBFAC,,, | Performed by: STUDENT IN AN ORGANIZED HEALTH CARE EDUCATION/TRAINING PROGRAM

## 2023-11-30 PROCEDURE — 1159F PR MEDICATION LIST DOCUMENTED IN MEDICAL RECORD: ICD-10-PCS | Mod: CPTII,S$GLB,, | Performed by: STUDENT IN AN ORGANIZED HEALTH CARE EDUCATION/TRAINING PROGRAM

## 2023-11-30 PROCEDURE — 3078F DIAST BP <80 MM HG: CPT | Mod: CPTII,S$GLB,, | Performed by: STUDENT IN AN ORGANIZED HEALTH CARE EDUCATION/TRAINING PROGRAM

## 2023-11-30 PROCEDURE — 3075F SYST BP GE 130 - 139MM HG: CPT | Mod: CPTII,S$GLB,, | Performed by: STUDENT IN AN ORGANIZED HEALTH CARE EDUCATION/TRAINING PROGRAM

## 2023-11-30 PROCEDURE — 3044F PR MOST RECENT HEMOGLOBIN A1C LEVEL <7.0%: ICD-10-PCS | Mod: CPTII,S$GLB,, | Performed by: STUDENT IN AN ORGANIZED HEALTH CARE EDUCATION/TRAINING PROGRAM

## 2023-12-02 PROBLEM — D69.6 THROMBOCYTOPENIA: Status: RESOLVED | Noted: 2023-11-13 | Resolved: 2023-12-02

## 2023-12-02 PROBLEM — I10 PRIMARY HYPERTENSION: Status: ACTIVE | Noted: 2023-11-30

## 2023-12-02 PROBLEM — E78.5 HYPERLIPIDEMIA: Status: ACTIVE | Noted: 2023-11-30

## 2023-12-02 PROBLEM — E66.01 CLASS 2 SEVERE OBESITY WITH SERIOUS COMORBIDITY AND BODY MASS INDEX (BMI) OF 35.0 TO 35.9 IN ADULT: Status: ACTIVE | Noted: 2023-12-02

## 2023-12-02 PROBLEM — E66.812 CLASS 2 SEVERE OBESITY WITH SERIOUS COMORBIDITY AND BODY MASS INDEX (BMI) OF 35.0 TO 35.9 IN ADULT: Status: ACTIVE | Noted: 2023-12-02

## 2023-12-02 NOTE — ASSESSMENT & PLAN NOTE
BP Readings from Last 3 Encounters:   11/30/23 133/64   11/22/23 (!) 116/57   11/16/23 (!) 150/74   Normotensive. Denies symptoms.   Continue with current antihypertensive regimen  - amlodipine 5 mg daily  - metoprolol succinate 25 mg daily  - furosemide 40 mg daily

## 2023-12-02 NOTE — ASSESSMENT & PLAN NOTE
s/p aortic valve replacement on Coumadin.  Currently coumadin discontinued due to elevated IVR readings >8.   - continue aspirin  - bleeding precautions provided   - INR and coumadin managed in [facility] clinic

## 2023-12-02 NOTE — PROGRESS NOTES
[facility] Note    HPI:  Chapo Rosario is a 63 y.o. male who presents today for Establish Care (Hospital admission follow up)  63 year old with hypertension, hyperlipidemia, s/p mechanical aortic valve replacement (on warfarin), ascending aorta tube graft, history of group B streptococcus bacteremia recently hospitalized with new onset atrial fibrillation in acute respiratory distress, sepsis, LEEANN, bacteremia with group G streptococcus species.   Patient minimally responded to IV diltiazem and  amiodarone and was transferred to [facility] for cardioversion and GISELE and was discharged on 11/22/23.   Currently following cardiology and infectious disease outpatient closely.   Last warfarin level elevated and is off his anticoagulation until Monday (12/4/23) when he will recheck INR at Coumadin clinic.   Since discharge, patient endorses overall feeling better, breathing better. Denies fever, chills, excessive headaches, vision changes, chest pain, palpitations, shortness of breath.    ROS   A review of systems was performed and was negative except as noted above.    I personally reviewed allergies, past medical, surgical, social and family history and updated as appropriate.    Medications:    Current Outpatient Medications:     acetaminophen (TYLENOL) 325 MG tablet, Take 2 tablets (650 mg total) by mouth every 8 (eight) hours as needed for Temperature greater than (or equal to 101 degree F)., Disp: 60 tablet, Rfl: 0    amiodarone (PACERONE) 200 MG Tab, Take 1 tablet (200 mg total) by mouth once daily., Disp: 30 tablet, Rfl: 1    amLODIPine (NORVASC) 5 MG tablet, Take 5 mg by mouth once daily., Disp: , Rfl:     aspirin (ECOTRIN) 81 MG EC tablet, Take 81 mg by mouth once daily., Disp: , Rfl:     b complex vitamins tablet, Take 1 tablet by mouth once daily., Disp: , Rfl:     cholecalciferol, vitamin D3, (VITAMIN D3) 125 mcg (5,000 unit) Tab, Take 5,000 Units by mouth once daily., Disp: , Rfl:      famotidine (PEPCID) 20 MG tablet, Take 1 tablet (20 mg total) by mouth 2 (two) times daily., Disp: 60 tablet, Rfl: 11    fenofibrate micronized (LOFIBRA) 134 MG Cap, Take 134 mg by mouth daily with breakfast., Disp: , Rfl:     furosemide (LASIX) 40 MG tablet, Take 1 tablet (40 mg total) by mouth once daily., Disp: 30 tablet, Rfl: 0    methocarbamoL (ROBAXIN) 750 MG Tab, Take 750 mg by mouth nightly as needed (spasms)., Disp: , Rfl:     metoprolol succinate (TOPROL-XL) 25 MG 24 hr tablet, Take 1 tablet (25 mg total) by mouth once daily., Disp: 30 tablet, Rfl: 11    potassium chloride SA (K-DUR,KLOR-CON) 20 MEQ tablet, Take 1 tablet (20 mEq total) by mouth 2 (two) times daily., Disp: 30 tablet, Rfl: 0    senna-docusate 8.6-50 mg (PERICOLACE) 8.6-50 mg per tablet, Take 2 tablets by mouth 2 (two) times daily as needed for Constipation., Disp: 60 tablet, Rfl: 0    tadalafiL (CIALIS) 5 MG tablet, Take 5 mg by mouth every evening., Disp: , Rfl:     warfarin (COUMADIN) 4 MG tablet, Take 2 tablets (8 mg total) by mouth every Mon, Wed, Fri, Sun., Disp: 32 tablet, Rfl: 11    warfarin (COUMADIN) 4 MG tablet, Take 1 tablet (4 mg total) by mouth every Tues, Thurs., Disp: 8 tablet, Rfl: 11     Health Maintenance:  Immunization History   Administered Date(s) Administered    COVID-19 Vaccine 03/17/2021, 04/14/2021, 12/08/2021    COVID-19, mRNA, LNP-S, bivalent booster, PF (Moderna Omicron)12 + YEARS 10/12/2022    Influenza 01/03/2014, 10/23/2014, 10/02/2015, 09/21/2016, 11/02/2017, 10/01/2018, 10/29/2021    Influenza - Quadrivalent - MDCK - PF 09/23/2019, 10/02/2022    Influenza - Quadrivalent - PF *Preferred* (6 months and older) 08/18/2020, 09/30/2023    Pneumococcal Polysaccharide - 23 Valent 10/01/2018    Zoster Recombinant 08/17/2022, 10/12/2022      Health Maintenance   Topic Date Due    Hepatitis C Screening  Never done    Lipid Panel  Never done    TETANUS VACCINE  Never done    Colorectal Cancer Screening  Never done     "Shingles Vaccine  Completed     Health Maintenance Topics with due status: Not Due       Topic Last Completion Date    Hemoglobin A1c (Diabetic Prevention Screening) 11/16/2023     Health Maintenance Due   Topic Date Due    Hepatitis C Screening  Never done    Lipid Panel  Never done    HIV Screening  Never done    TETANUS VACCINE  Never done    Colorectal Cancer Screening  Never done    RSV Vaccine (Age 60+ and Pregnant patients) (1 - 1-dose 60+ series) Never done    COVID-19 Vaccine (5 - 2023-24 season) 09/01/2023     PHYSICAL EXAM:  Vitals:    11/30/23 1314   BP: 133/64   BP Location: Right arm   Patient Position: Sitting   BP Method: Large (Automatic)   Pulse: 85   Resp: 16   Temp: 98.1 °F (36.7 °C)   SpO2: 96%   Weight: 125.6 kg (277 lb)   Height: 6' 2" (1.88 m)     Body mass index is 35.56 kg/m².  Physical Exam  Vitals and nursing note reviewed.   Constitutional:       General: He is not in acute distress.     Appearance: Normal appearance. He is not toxic-appearing.   HENT:      Head: Normocephalic and atraumatic.      Right Ear: External ear normal.      Left Ear: External ear normal.      Nose: Nose normal.      Mouth/Throat:      Mouth: Mucous membranes are dry.      Pharynx: Oropharynx is clear.   Eyes:      Extraocular Movements: Extraocular movements intact.      Conjunctiva/sclera: Conjunctivae normal.   Cardiovascular:      Rate and Rhythm: Normal rate and regular rhythm.      Pulses: Normal pulses.      Heart sounds: Normal heart sounds. No murmur heard.  Pulmonary:      Effort: Pulmonary effort is normal. No respiratory distress.      Breath sounds: Normal breath sounds. No wheezing, rhonchi or rales.   Abdominal:      General: Abdomen is flat. There is no distension.      Palpations: Abdomen is soft. There is no mass.      Tenderness: There is no abdominal tenderness. There is no right CVA tenderness, left CVA tenderness or guarding.      Comments: protuberant   Musculoskeletal:         General: " Normal range of motion.      Cervical back: Normal range of motion and neck supple. No rigidity.   Skin:     General: Skin is warm and dry.      Capillary Refill: Capillary refill takes less than 2 seconds.      Findings: Bruising present. No rash.   Neurological:      General: No focal deficit present.      Mental Status: He is alert and oriented to person, place, and time. Mental status is at baseline.      Motor: No weakness.      Gait: Gait normal.   Psychiatric:         Mood and Affect: Mood normal.         Behavior: Behavior normal.         Thought Content: Thought content normal.         Judgment: Judgment normal.        ASSESSMENT/PLAN:  1. Streptococcal bacteremia Group G  Assessment & Plan:  Completed IV rocephin. Following outpatient ID monitoring inflammatory markers.         2. Thrombocytopenia  Assessment & Plan:  Lab Results   Component Value Date     11/22/2023     11/21/2023     11/20/2023         3. LEEANN (acute kidney injury)  Assessment & Plan:  Resolved. Continue to monitor.       4. New onset a-fib  Assessment & Plan:  S/p successful cardioversion on 11/17/23. HR 85. Denies symptoms.   Current meds include,   - amiodarone 200 mg daily  - metoprolol succinate 25 mg daily  - f/u primary cardiology notes         5. Mechanical heart valve present  Assessment & Plan:  s/p aortic valve replacement on Coumadin.  Currently coumadin discontinued due to elevated IVR readings >8.   - bleeding precautions provided   - INR and coumadin managed in [facility]        6. Class 2 severe obesity with serious comorbidity and body mass index (BMI) of 35.0 to 35.9 in adult, unspecified obesity type  Assessment & Plan:  Wt Readings from Last 8 Encounters:   11/30/23 125.6 kg (277 lb)   11/22/23 127.1 kg (280 lb 1.6 oz)   11/12/23 (!) 137.6 kg (303 lb 6.4 oz)   07/29/23 (!) 139.7 kg (308 lb)   09/29/18 131.1 kg (289 lb)   09/07/18 130.4 kg (287 lb 7.7 oz)   Recommendations:   Stay physically  active. As tolerated alternate resistance training with stretching and cardio. Goal of 150 minutes per week of moderate intensity activity or 7,500 - 10,000 steps per day. Follow the Mediterranean Diet. Include whole fresh fruits, vegetables, olive oil, seeds, nuts, whole grains, cold water fish, salmon, mackerel and lean cuts of meat.  Do not drink sugary/diet carbonated beverages. Decrease portion sizes slightly which will result in an approximately 500-calorie deficit. Avoid fast or fried and processed food, especially canned foods. Avoid refined carbohydrates, white starchy foods, flour, white potato, bread, muffins, and cakes. Consider substituting one meal a day with a meal replacement such as Slim fast, lean cuisine, or weight watcher's. Follow a healthy diet that includes enough calcium, vitamin D and proteins for bone health.            Other than changes above, continue current medications and maintain follow up with specialists.      No follow-ups on file.   Recent Results (from the past 2016 hour(s))   RSV Antigen Detection Nasopharyngeal Swab    Collection Time: 11/12/23  8:34 AM   Result Value Ref Range    RSV Source NP     RSV Ag by Molecular Method Negative Negative   Influenza A & B by Molecular    Collection Time: 11/12/23  8:34 AM    Specimen: Nasopharyngeal Swab   Result Value Ref Range    Influenza A, Molecular Negative Negative    Influenza B, Molecular Negative Negative    Flu A & B Source Nasal Swab    Blood culture x two cultures. Draw prior to antibiotics.    Collection Time: 11/12/23  8:36 AM    Specimen: Blood   Result Value Ref Range    Blood Culture, Routine       Gram stain peds bottle: Gram positive cocci in chains resembling Strep    Blood Culture, Routine       Positive results previously called 11/12/2023  22:38    Blood Culture, Routine       Gram stain aer bottle: Gram positive cocci in chains resembling Strep    Blood Culture, Routine       Positive results previously called  11/13/2023  01:05    Blood Culture, Routine (A)      GROUP G STREPTOCOCCUS  Beta-hemolytic streptococci are routinely susceptible to   penicillins,cephalosporins and carbapenems.         Susceptibility    Group G streptococcus - CULTURE, BLOOD     Ampicillin 0.25 Sensitive mcg/mL     Azithromycin <=0.25 Sensitive mcg/mL     Chloramphenicol 4 Sensitive mcg/mL     Ceftriaxone <=0.25 Sensitive mcg/mL     Clindamycin <=0.06 Sensitive mcg/mL     Cefotaxime <=0.25 Sensitive mcg/mL     Cefepime <=0.25 Sensitive mcg/mL     Erythromycin <=0.06 Sensitive mcg/mL     Levofloxacin 0.5 Sensitive mcg/mL     Penicillin <=0.03 Sensitive mcg/mL     Tetracycline >4 Resistant mcg/mL     Vancomycin 0.5 Sensitive mcg/mL   Blood culture x two cultures. Draw prior to antibiotics.    Collection Time: 11/12/23  8:37 AM    Specimen: Blood   Result Value Ref Range    Blood Culture, Routine       Gram stain aer bottle: Gram positive cocci in chains resembling Strep    Blood Culture, Routine       Results called to and read back by: VALENTINO GALLARDO 11/12/2023  21:16    Blood Culture, Routine       Gram stain julia bottle: Gram positive cocci in chains resembling Strep    Blood Culture, Routine Positive results previously called 11/12/2023     Blood Culture, Routine (A)      STREPTOCOCCUS GROUP G  Beta-hemolytic streptococci are routinely susceptible to   penicillins,cephalosporins and carbapenems.  For susceptibility see order #[id number]     CBC auto differential    Collection Time: 11/12/23  8:37 AM   Result Value Ref Range    WBC 14.37 (H) 3.90 - 12.70 K/uL    RBC 4.17 (L) 4.60 - 6.20 M/uL    Hemoglobin 14.4 14.0 - 18.0 g/dL    Hematocrit 40.1 40.0 - 54.0 %    MCV 96 82 - 98 fL    MCH 34.5 (H) 27.0 - 31.0 pg    MCHC 35.9 32.0 - 36.0 g/dL    RDW 14.1 11.5 - 14.5 %    Platelets 119 (L) 150 - 450 K/uL    MPV 9.9 9.2 - 12.9 fL    Immature Granulocytes CANCELED 0.0 - 0.5 %    Immature Grans (Abs) CANCELED 0.00 - 0.04 K/uL    nRBC 0 0 /100 WBC    Gran %  88.0 (H) 38.0 - 73.0 %    Lymph % 3.0 (L) 18.0 - 48.0 %    Mono % 1.0 (L) 4.0 - 15.0 %    Eosinophil % 0.0 0.0 - 8.0 %    Basophil % 0.0 0.0 - 1.9 %    Bands 6.0 %    Metamyelocytes 2.0 %    Poly Occasional     Dohle Bodies Present     Vacuolated Granulocytes Present     Differential Method Manual    Comprehensive metabolic panel    Collection Time: 11/12/23  8:37 AM   Result Value Ref Range    Sodium 139 136 - 145 mmol/L    Potassium 3.9 3.5 - 5.1 mmol/L    Chloride 110 95 - 110 mmol/L    CO2 19 (L) 23 - 29 mmol/L    Glucose 179 (H) 70 - 110 mg/dL    BUN 30 (H) 8 - 23 mg/dL    Creatinine 2.0 (H) 0.5 - 1.4 mg/dL    Calcium 8.3 (L) 8.7 - 10.5 mg/dL    Total Protein 7.3 6.0 - 8.4 g/dL    Albumin 3.4 (L) 3.5 - 5.2 g/dL    Total Bilirubin 3.0 (H) 0.1 - 1.0 mg/dL    Alkaline Phosphatase 51 (L) 55 - 135 U/L    AST 93 (H) 10 - 40 U/L    ALT 47 (H) 10 - 44 U/L    eGFR 37.3 (A) >60 mL/min/1.73 m^2    Anion Gap 10 3 - 11 mmol/L   Lactic acid, plasma #1    Collection Time: 11/12/23  8:37 AM   Result Value Ref Range    Lactate (Lactic Acid) 8.3 (HH) 0.5 - 2.2 mmol/L   APTT    Collection Time: 11/12/23  8:37 AM   Result Value Ref Range    aPTT 35.6 (H) 21.0 - 32.0 sec   Procalcitonin    Collection Time: 11/12/23  8:37 AM   Result Value Ref Range    Procalcitonin 32.56 (H) <0.25 ng/mL   Protime-INR    Collection Time: 11/12/23  8:37 AM   Result Value Ref Range    Prothrombin Time 27.1 (H) 9.0 - 12.5 sec    INR 3.0 (H) 0.8 - 1.2   NT-Pro Natriuretic Peptide    Collection Time: 11/12/23  8:37 AM   Result Value Ref Range    NT-proBNP 4573 (H) 5 - 900 pg/mL   Rapid Organism ID by PCR (from Blood culture)    Collection Time: 11/12/23  8:37 AM   Result Value Ref Range    Enterococcus faecalis Not Detected Not Detected    Enterococcus faecium Not Detected Not Detected    Listeria monocytogenes Not Detected Not Detected    Staphylococcus spp. Not Detected Not Detected    Staphylococcus aureus Not Detected Not Detected    Staphylococcus  epidermidis Not Detected Not Detected    Staphylococcus lugdunensis Not Detected Not Detected    Streptococcus species Detected (A) Not Detected    Streptococcus agalactiae Not Detected Not Detected    Streptococcus pneumoniae Not Detected Not Detected    Streptococcus pyogenes Not Detected Not Detected    Acinetobacter calcoaceticus/baumannii complex Not Detected Not Detected    Bacteroides fragilis Not Detected Not Detected    Enterobacterales Not Detected Not Detected    Enterobacter cloacae complex Not Detected Not Detected    Escherichia coli Not Detected Not Detected    Klebsiella aerogenes Not Detected Not Detected    Klebsiella oxytoca Not Detected Not Detected    Klebsiella pneumoniae group Not Detected Not Detected    Proteus Not Detected Not Detected    Salmonella sp Not Detected Not Detected    Serratia marcescens Not Detected Not Detected    Haemophilus influenzae Not Detected Not Detected    Neisseria meningtidis Not Detected Not Detected    Pseudomonas aeruginosa Not Detected Not Detected    Stenotrophomonas maltophilia Not Detected Not Detected    Candida albicans Not Detected Not Detected    Candida auris Not Detected Not Detected    Candida glabrata Not Detected Not Detected    Candida krusei Not Detected Not Detected    Candida parapsilosis Not Detected Not Detected    Candida tropicalis Not Detected Not Detected    Cryptococcus neoformans/gattii Not Detected Not Detected    CTX-M (ESBL ) Test Not Applicable Not Detected    IMP (Carbapenem resistant) Test Not Applicable Not Detected    KPC resistance gene (Carbapenem resistant) Test Not Applicable Not Detected    mcr-1  Test Not Applicable Not Detected    mec A/C  Test Not Applicable Not Detected    mec A/C and MREJ (MRSA) gene Test Not Applicable Not Detected    NDM (Carbapenem resistant) Test Not Applicable Not Detected    OXA-48-like (Carbapenem resistant) Test Not Applicable Not Detected    van A/B (VRE gene) Test Not Applicable Not  Detected    VIM (Carbapenem resistant) Test Not Applicable Not Detected   Urinalysis, Reflex to Urine Culture Urine, Clean Catch    Collection Time: 11/12/23  8:48 AM    Specimen: Urine, Clean Catch   Result Value Ref Range    Specimen UA Urine, Clean Catch     Color, UA Orange (A) Yellow, Straw, Oliva    Appearance, UA Cloudy (A) Clear    pH, UA 6.0 5.0 - 8.0    Specific Gravity, UA >=1.030 (A) 1.005 - 1.030    Protein, UA 1+ (A) Negative    Glucose, UA Negative Negative    Ketones, UA Negative Negative    Bilirubin (UA) 1+ (A) Negative    Occult Blood UA Trace (A) Negative    Nitrite, UA Positive (A) Negative    Urobilinogen, UA 1.0 <2.0 EU/dL    Leukocytes, UA Trace (A) Negative   Urinalysis Microscopic    Collection Time: 11/12/23  8:48 AM   Result Value Ref Range    RBC, UA 4 0 - 4 /hpf    WBC, UA 10 (H) 0 - 5 /hpf    Bacteria Negative None-Occ /hpf    Squam Epithel, UA 4 /hpf    Hyaline Casts, UA 0 0-1/lpf /lpf    Granular Casts, UA 1 (A) None /lpf    Microscopic Comment SEE COMMENT    POCT COVID-19 Rapid Screening    Collection Time: 11/12/23  8:48 AM   Result Value Ref Range    POC Rapid COVID Negative Negative     Acceptable Yes    POCT ARTERIAL BLOOD GAS    Collection Time: 11/12/23  8:52 AM   Result Value Ref Range    POC PH 7.328 (L) 7.345 - 7.450    POC PCO2 29.7 (L) 35.0 - 45.0 mmHg    POC PO2 61.6 (L) 80.0 - 100 mmHg    POC SATURATED O2 93.7 (L) 95.0 - 100.0 %    POC HCO3 17.2 (L) 24.0 - 28.0 mmol/l    Base Deficit -10.4 (L) -2.0 - 2.0 mmol/l    POC Temp 37.0 C    Specimen source Arterial     Performed By: MARIO Young     MODIFIED HARRIETT'S TEST +     FiO2 28.0 %    O2DEVICE Nasal Cannula     LPM 2.0    Lactic acid, plasma #2    Collection Time: 11/12/23 12:30 PM   Result Value Ref Range    Lactate (Lactic Acid) 6.7 (HH) 0.5 - 2.2 mmol/L   Lactic Acid, Plasma    Collection Time: 11/12/23  9:05 PM   Result Value Ref Range    Lactate (Lactic Acid) 5.5 (HH) 0.5 - 2.2 mmol/L   Lactic Acid,  Plasma    Collection Time: 11/13/23  1:30 AM   Result Value Ref Range    Lactate (Lactic Acid) 6.0 (HH) 0.5 - 2.2 mmol/L   Vancomycin, Random    Collection Time: 11/13/23  5:26 AM   Result Value Ref Range    Vancomycin, Random 8.2 Not established ug/mL   Comprehensive metabolic panel    Collection Time: 11/13/23  5:26 AM   Result Value Ref Range    Sodium 142 136 - 145 mmol/L    Potassium 4.2 3.5 - 5.1 mmol/L    Chloride 113 (H) 95 - 110 mmol/L    CO2 17 (L) 23 - 29 mmol/L    Glucose 155 (H) 70 - 110 mg/dL    BUN 48 (H) 8 - 23 mg/dL    Creatinine 2.1 (H) 0.5 - 1.4 mg/dL    Calcium 7.9 (L) 8.7 - 10.5 mg/dL    Total Protein 7.3 6.0 - 8.4 g/dL    Albumin 3.3 (L) 3.5 - 5.2 g/dL    Total Bilirubin 4.3 (H) 0.1 - 1.0 mg/dL    Alkaline Phosphatase 34 (L) 55 - 135 U/L     (H) 10 - 40 U/L    ALT 72 (H) 10 - 44 U/L    eGFR 35.2 (A) >60 mL/min/1.73 m^2    Anion Gap 12 (H) 3 - 11 mmol/L   CBC auto differential    Collection Time: 11/13/23  5:27 AM   Result Value Ref Range    WBC 13.21 (H) 3.90 - 12.70 K/uL    RBC 3.95 (L) 4.60 - 6.20 M/uL    Hemoglobin 13.6 (L) 14.0 - 18.0 g/dL    Hematocrit 39.2 (L) 40.0 - 54.0 %    MCV 99 (H) 82 - 98 fL    MCH 34.4 (H) 27.0 - 31.0 pg    MCHC 34.7 32.0 - 36.0 g/dL    RDW 15.0 (H) 11.5 - 14.5 %    Platelets 88 (L) 150 - 450 K/uL    MPV 11.3 9.2 - 12.9 fL    Immature Granulocytes Test Not Performed 0.0 - 0.5 %    Immature Grans (Abs) Test Not Performed 0.00 - 0.04 K/uL    nRBC 0 0 /100 WBC    Gran % 72.0 38.0 - 73.0 %    Lymph % 16.0 (L) 18.0 - 48.0 %    Mono % 7.0 4.0 - 15.0 %    Eosinophil % 0.0 0.0 - 8.0 %    Basophil % 0.0 0.0 - 1.9 %    Bands 5.0 %    Poly Occasional     Dohle Bodies Present     Vacuolated Granulocytes Present     Differential Method Manual    Protime-INR    Collection Time: 11/13/23  6:20 AM   Result Value Ref Range    Prothrombin Time 25.4 (H) 9.0 - 12.5 sec    INR 2.8 (H) 0.8 - 1.2   Lactic Acid, Plasma    Collection Time: 11/13/23  6:20 AM   Result Value Ref Range     Lactate (Lactic Acid) 6.9 (HH) 0.5 - 2.2 mmol/L   Lactic Acid, Plasma    Collection Time: 11/13/23  4:21 PM   Result Value Ref Range    Lactate (Lactic Acid) 3.7 (HH) 0.5 - 2.2 mmol/L   Lactic Acid, Plasma    Collection Time: 11/13/23  8:12 PM   Result Value Ref Range    Lactate (Lactic Acid) 4.6 (HH) 0.5 - 2.2 mmol/L   Protime-INR    Collection Time: 11/14/23  5:05 AM   Result Value Ref Range    Prothrombin Time 23.9 (H) 9.0 - 12.5 sec    INR 2.6 (H) 0.8 - 1.2   Comprehensive Metabolic Panel    Collection Time: 11/14/23  5:05 AM   Result Value Ref Range    Sodium 140 136 - 145 mmol/L    Potassium 3.6 3.5 - 5.1 mmol/L    Chloride 110 95 - 110 mmol/L    CO2 25 23 - 29 mmol/L    Glucose 230 (H) 70 - 110 mg/dL    BUN 47 (H) 8 - 23 mg/dL    Creatinine 1.5 (H) 0.5 - 1.4 mg/dL    Calcium 8.2 (L) 8.7 - 10.5 mg/dL    Total Protein 6.8 6.0 - 8.4 g/dL    Albumin 3.0 (L) 3.5 - 5.2 g/dL    Total Bilirubin 3.5 (H) 0.1 - 1.0 mg/dL    Alkaline Phosphatase 47 (L) 55 - 135 U/L    AST 89 (H) 10 - 40 U/L    ALT 62 (H) 10 - 44 U/L    eGFR 52.6 (A) >60 mL/min/1.73 m^2    Anion Gap 5 3 - 11 mmol/L   CBC Auto Differential    Collection Time: 11/14/23  5:05 AM   Result Value Ref Range    WBC 8.37 3.90 - 12.70 K/uL    RBC 3.46 (L) 4.60 - 6.20 M/uL    Hemoglobin 11.9 (L) 14.0 - 18.0 g/dL    Hematocrit 33.9 (L) 40.0 - 54.0 %    MCV 98 82 - 98 fL    MCH 34.4 (H) 27.0 - 31.0 pg    MCHC 35.1 32.0 - 36.0 g/dL    RDW 15.0 (H) 11.5 - 14.5 %    Platelets 73 (L) 150 - 450 K/uL    MPV 11.1 9.2 - 12.9 fL    Immature Granulocytes CANCELED 0.0 - 0.5 %    Immature Grans (Abs) CANCELED 0.00 - 0.04 K/uL    Lymph # CANCELED 1.0 - 4.8 K/uL    Mono # CANCELED 0.3 - 1.0 K/uL    Eos # CANCELED 0.0 - 0.5 K/uL    Baso # CANCELED 0.00 - 0.20 K/uL    nRBC 0 0 /100 WBC    Gran % 66.0 38.0 - 73.0 %    Lymph % 16.0 (L) 18.0 - 48.0 %    Mono % 7.0 4.0 - 15.0 %    Eosinophil % 0.0 0.0 - 8.0 %    Basophil % 0.0 0.0 - 1.9 %    Bands 11.0 %    Differential Method Manual     Echo    Collection Time: 11/14/23  6:49 PM   Result Value Ref Range    BSA 2.68 m2    LVOT stroke volume 56.70 cm3    LVIDd 6.89 (A) 3.5 - 6.0 cm    LV Systolic Volume 51.27 mL    LV Systolic Volume Index 19.8 mL/m2    LVIDs 3.51 2.1 - 4.0 cm    LV Diastolic Volume 246.14 mL    LV Diastolic Volume Index 95.03 mL/m2    IVS 1.03 0.6 - 1.1 cm    LVOT diameter 2.02 cm    LVOT area 3.2 cm2    FS 49 (A) 28 - 44 %    Left Ventricle Relative Wall Thickness 0.25 cm    Posterior Wall 0.85 0.6 - 1.1 cm    LV mass 289.67 g    LV Mass Index 112 g/m2    MV Peak E Andres 1.09 m/s    TDI LATERAL 0.11 m/s    TDI SEPTAL 0.11 m/s    E/E' ratio 9.91 m/s    MV Peak A Andres 0.01 m/s    TR Max Andres 1.75 m/s    E/A ratio 109.00     E wave deceleration time 181.51 msec    LV SEPTAL E/E' RATIO 9.91 m/s    LV LATERAL E/E' RATIO 9.91 m/s    PV Peak S Andres 0.33 m/s    PV Peak D Andres 0.35 m/s    Pulm vein S/D ratio 0.94     LVOT peak andres 0.99 m/s    Left Ventricular Outflow Tract Mean Velocity 0.73 cm/s    Left Ventricular Outflow Tract Mean Gradient 2.39 mmHg    RVDD 1.93 cm    RV S' 9.77 cm/s    LA size 4.14 cm    Left Atrium Major Axis 5.63 cm    LA volume (mod) 80.18 cm3    LA Volume Index (Mod) 31.0 mL/m2    RA Major Axis 6.20 cm    AV mean gradient 8 mmHg    AV peak gradient 16 mmHg    Ao peak andres 1.97 m/s    Ao VTI 30.00 cm    LVOT peak VTI 17.70 cm    AV valve area 1.89 cm²    AV Velocity Ratio 0.50     AV index (prosthetic) 0.59     DEXTER by Velocity Ratio 1.61 cm²    MV stenosis pressure 1/2 time 52.64 ms    MV valve area p 1/2 method 4.18 cm2    Triscuspid Valve Regurgitation Peak Gradient 12 mmHg    PV PEAK VELOCITY 0.97 m/s    PV peak gradient 4 mmHg    Pulmonary Valve Mean Velocity 0.67 m/s    IVC diameter 1.66 cm    Mean e' 0.11 m/s    ZLVIDS -8.68     ZLVIDD -9.56    Protime-INR    Collection Time: 11/15/23  3:39 AM   Result Value Ref Range    Prothrombin Time 23.6 (H) 9.0 - 12.5 sec    INR 2.6 (H) 0.8 - 1.2   Comprehensive Metabolic  Panel    Collection Time: 11/15/23  3:39 AM   Result Value Ref Range    Sodium 141 136 - 145 mmol/L    Potassium 3.5 3.5 - 5.1 mmol/L    Chloride 110 95 - 110 mmol/L    CO2 27 23 - 29 mmol/L    Glucose 198 (H) 70 - 110 mg/dL    BUN 35 (H) 8 - 23 mg/dL    Creatinine 1.1 0.5 - 1.4 mg/dL    Calcium 8.2 (L) 8.7 - 10.5 mg/dL    Total Protein 6.5 6.0 - 8.4 g/dL    Albumin 2.9 (L) 3.5 - 5.2 g/dL    Total Bilirubin 2.2 (H) 0.1 - 1.0 mg/dL    Alkaline Phosphatase 67 55 - 135 U/L    AST 61 (H) 10 - 40 U/L    ALT 55 (H) 10 - 44 U/L    eGFR >60.0 >60 mL/min/1.73 m^2    Anion Gap 4 3 - 11 mmol/L   CBC Auto Differential    Collection Time: 11/15/23  3:39 AM   Result Value Ref Range    WBC 9.45 3.90 - 12.70 K/uL    RBC 3.47 (L) 4.60 - 6.20 M/uL    Hemoglobin 11.7 (L) 14.0 - 18.0 g/dL    Hematocrit 33.8 (L) 40.0 - 54.0 %    MCV 97 82 - 98 fL    MCH 33.7 (H) 27.0 - 31.0 pg    MCHC 34.6 32.0 - 36.0 g/dL    RDW 14.7 (H) 11.5 - 14.5 %    Platelets 71 (L) 150 - 450 K/uL    MPV 11.1 9.2 - 12.9 fL    Immature Granulocytes 0.6 (H) 0.0 - 0.5 %    Gran # (ANC) 6.7 1.8 - 7.7 K/uL    Immature Grans (Abs) 0.06 (H) 0.00 - 0.04 K/uL    Lymph # 1.9 1.0 - 4.8 K/uL    Mono # 0.7 0.3 - 1.0 K/uL    Eos # 0.0 0.0 - 0.5 K/uL    Baso # 0.03 0.00 - 0.20 K/uL    nRBC 0 0 /100 WBC    Gran % 71.1 38.0 - 73.0 %    Lymph % 20.2 18.0 - 48.0 %    Mono % 7.8 4.0 - 15.0 %    Eosinophil % 0.0 0.0 - 8.0 %    Basophil % 0.3 0.0 - 1.9 %    Differential Method Automated    Protime-INR    Collection Time: 11/16/23  4:33 AM   Result Value Ref Range    Prothrombin Time 32.9 (H) 9.0 - 12.5 sec    INR 3.6 (H) 0.8 - 1.2   Comprehensive Metabolic Panel    Collection Time: 11/16/23  4:33 AM   Result Value Ref Range    Sodium 138 136 - 145 mmol/L    Potassium 4.9 3.5 - 5.1 mmol/L    Chloride 109 95 - 110 mmol/L    CO2 28 23 - 29 mmol/L    Glucose 182 (H) 70 - 110 mg/dL    BUN 20 8 - 23 mg/dL    Creatinine 1.0 0.5 - 1.4 mg/dL    Calcium 7.9 (L) 8.7 - 10.5 mg/dL    Total Protein  6.4 6.0 - 8.4 g/dL    Albumin 2.5 (L) 3.5 - 5.2 g/dL    Total Bilirubin 1.6 (H) 0.1 - 1.0 mg/dL    Alkaline Phosphatase 104 55 - 135 U/L     (H) 10 - 40 U/L    ALT 60 (H) 10 - 44 U/L    eGFR >60.0 >60 mL/min/1.73 m^2    Anion Gap 1 (L) 3 - 11 mmol/L   CBC Auto Differential    Collection Time: 11/16/23  4:33 AM   Result Value Ref Range    WBC 9.94 3.90 - 12.70 K/uL    RBC 3.50 (L) 4.60 - 6.20 M/uL    Hemoglobin 11.9 (L) 14.0 - 18.0 g/dL    Hematocrit 34.1 (L) 40.0 - 54.0 %    MCV 97 82 - 98 fL    MCH 34.0 (H) 27.0 - 31.0 pg    MCHC 34.9 32.0 - 36.0 g/dL    RDW 14.7 (H) 11.5 - 14.5 %    Platelets 91 (L) 150 - 450 K/uL    MPV 12.1 9.2 - 12.9 fL    Immature Granulocytes 2.6 (H) 0.0 - 0.5 %    Gran # (ANC) 6.0 1.8 - 7.7 K/uL    Immature Grans (Abs) 0.26 (H) 0.00 - 0.04 K/uL    Lymph # 2.5 1.0 - 4.8 K/uL    Mono # 1.1 (H) 0.3 - 1.0 K/uL    Eos # 0.0 0.0 - 0.5 K/uL    Baso # 0.06 0.00 - 0.20 K/uL    nRBC 0 0 /100 WBC    Gran % 60.1 38.0 - 73.0 %    Lymph % 24.9 18.0 - 48.0 %    Mono % 11.4 4.0 - 15.0 %    Eosinophil % 0.4 0.0 - 8.0 %    Basophil % 0.6 0.0 - 1.9 %    Toxic Granulation Present     Differential Method Automated    Hemoglobin A1C    Collection Time: 11/16/23  4:33 AM   Result Value Ref Range    Hemoglobin A1C 6.4 (H) 4.0 - 5.6 %    Estimated Avg Glucose 137 (H) 68 - 131 mg/dL   Blood culture    Collection Time: 11/16/23  8:01 AM    Specimen: Blood   Result Value Ref Range    Blood Culture, Routine No growth after 5 days.    Blood culture    Collection Time: 11/16/23  8:07 AM    Specimen: Blood   Result Value Ref Range    Blood Culture, Routine No growth after 5 days.    Basic metabolic panel (BMP)    Collection Time: 11/17/23  3:36 AM   Result Value Ref Range    Sodium 139 136 - 145 mmol/L    Potassium 3.7 3.5 - 5.1 mmol/L    Chloride 108 95 - 110 mmol/L    CO2 21 (L) 23 - 29 mmol/L    Glucose 179 (H) 74 - 106 mg/dL    BUN 17 9 - 20 mg/dL    Creatinine 0.70 (L) 0.80 - 1.50 mg/dL    Calcium 8.3 (L) 8.4 -  10.2 mg/dL    Anion Gap 10 8 - 16 mmol/L    eGFR >60 >60 mL/min/1.73 m^2   CBC with Auto Differential    Collection Time: 11/17/23  3:36 AM   Result Value Ref Range    WBC 10.90 3.90 - 12.70 K/uL    RBC 3.65 (L) 4.60 - 6.20 M/uL    Hemoglobin 12.6 (L) 14.0 - 18.0 g/dL    Hematocrit 35.9 (L) 40.0 - 54.0 %    MCV 98 82 - 98 fL    MCH 34.4 (H) 27.0 - 31.0 pg    MCHC 35.0 32.0 - 36.0 g/dL    RDW 14.1 11.5 - 14.5 %    Platelets 120 (L) 150 - 450 K/uL    MPV 8.8 7.4 - 10.4 fL    Gran # (ANC) 7.0 1.8 - 7.7 K/uL    Lymph # 2.9 1.0 - 4.8 K/uL    Mono # 1.0 0.3 - 1.0 K/uL    Eos # 0.0 0.0 - 0.5 K/uL    Baso # 0.00 0.00 - 0.20 K/uL    nRBC 0 0 /100 WBC    Gran % 63.7 38.0 - 73.0 %    Lymph % 26.2 18.0 - 48.0 %    Mono % 9.4 4.0 - 15.0 %    Eosinophil % 0.3 0.0 - 8.0 %    Basophil % 0.4 0.0 - 1.9 %    Differential Method Automated    Protime-INR (PT/INR)    Collection Time: 11/17/23  3:36 AM   Result Value Ref Range    PT 32.5 (H) 12.2 - 14.6 sec    INR 3.2 (H) <1.2   CBC Auto Differential    Collection Time: 11/17/23  3:36 AM   Result Value Ref Range    WBC 10.90 3.90 - 12.70 K/uL    RBC 3.65 (L) 4.60 - 6.20 M/uL    Hemoglobin 12.6 (L) 14.0 - 18.0 g/dL    Hematocrit 35.9 (L) 40.0 - 54.0 %    MCV 98 82 - 98 fL    MCH 34.4 (H) 27.0 - 31.0 pg    MCHC 35.0 32.0 - 36.0 g/dL    RDW 14.1 11.5 - 14.5 %    Platelets 120 (L) 150 - 450 K/uL    MPV 8.8 7.4 - 10.4 fL    Gran # (ANC) 7.0 1.8 - 7.7 K/uL    Lymph # 2.9 1.0 - 4.8 K/uL    Mono # 1.0 0.3 - 1.0 K/uL    Eos # 0.0 0.0 - 0.5 K/uL    Baso # 0.00 0.00 - 0.20 K/uL    nRBC 0 0 /100 WBC    Gran % 63.7 38.0 - 73.0 %    Lymph % 26.2 18.0 - 48.0 %    Mono % 9.4 4.0 - 15.0 %    Eosinophil % 0.3 0.0 - 8.0 %    Basophil % 0.4 0.0 - 1.9 %    Differential Method Automated    Protime-INR    Collection Time: 11/17/23  3:36 AM   Result Value Ref Range    PT 32.5 (H) 12.2 - 14.6 sec    INR 3.2 (H) <1.2   Comprehensive Metabolic Panel    Collection Time: 11/17/23  3:36 AM   Result Value Ref Range     Sodium 139 136 - 145 mmol/L    Potassium 3.7 3.5 - 5.1 mmol/L    Chloride 108 95 - 110 mmol/L    CO2 21 (L) 23 - 29 mmol/L    Glucose 179 (H) 74 - 106 mg/dL    BUN 17 9 - 20 mg/dL    Creatinine 0.70 (L) 0.80 - 1.50 mg/dL    Calcium 8.3 (L) 8.4 - 10.2 mg/dL    Total Protein 6.5 6.3 - 8.2 g/dL    Albumin 3.0 (L) 3.5 - 5.2 g/dL    Total Bilirubin 2.5 (H) 0.2 - 1.3 mg/dL    Alkaline Phosphatase 123 38 - 145 U/L    AST 63 (H) 17 - 59 U/L    ALT 57 (H) 10 - 44 U/L    Anion Gap 10 8 - 16 mmol/L    eGFR >60 >60 mL/min/1.73 m^2   Magnesium    Collection Time: 11/17/23  3:36 AM   Result Value Ref Range    Magnesium 1.8 1.6 - 2.6 mg/dL   Protime-INR    Collection Time: 11/18/23  4:28 AM   Result Value Ref Range    PT 29.0 (H) 12.2 - 14.6 sec    INR 2.7 (H) <1.2   Comprehensive Metabolic Panel    Collection Time: 11/18/23  4:28 AM   Result Value Ref Range    Sodium 136 136 - 145 mmol/L    Potassium 3.9 3.5 - 5.1 mmol/L    Chloride 108 95 - 110 mmol/L    CO2 23 23 - 29 mmol/L    Glucose 185 (H) 74 - 106 mg/dL    BUN 18 9 - 20 mg/dL    Creatinine 0.79 (L) 0.80 - 1.50 mg/dL    Calcium 8.2 (L) 8.4 - 10.2 mg/dL    Total Protein 7.5 6.3 - 8.2 g/dL    Albumin 3.4 (L) 3.5 - 5.2 g/dL    Total Bilirubin 2.7 (H) 0.2 - 1.3 mg/dL    Alkaline Phosphatase 151 (H) 38 - 145 U/L    AST 61 (H) 17 - 59 U/L    ALT 51 (H) 10 - 44 U/L    Anion Gap 5 (L) 8 - 16 mmol/L    eGFR >60 >60 mL/min/1.73 m^2   CBC Auto Differential    Collection Time: 11/18/23  4:28 AM   Result Value Ref Range    WBC 12.80 (H) 3.90 - 12.70 K/uL    RBC 3.63 (L) 4.60 - 6.20 M/uL    Hemoglobin 12.3 (L) 14.0 - 18.0 g/dL    Hematocrit 35.6 (L) 40.0 - 54.0 %    MCV 98 82 - 98 fL    MCH 33.8 (H) 27.0 - 31.0 pg    MCHC 34.4 32.0 - 36.0 g/dL    RDW 14.4 11.5 - 14.5 %    Platelets 196 150 - 450 K/uL    MPV 8.7 7.4 - 10.4 fL    Gran # (ANC) 8.9 (H) 1.8 - 7.7 K/uL    Lymph # 2.6 1.0 - 4.8 K/uL    Mono # 1.1 (H) 0.3 - 1.0 K/uL    Eos # 0.0 0.0 - 0.5 K/uL    Baso # 0.00 0.00 - 0.20 K/uL     nRBC 0 0 /100 WBC    Gran % 70.0 38.0 - 73.0 %    Lymph % 20.6 18.0 - 48.0 %    Mono % 8.8 4.0 - 15.0 %    Eosinophil % 0.3 0.0 - 8.0 %    Basophil % 0.3 0.0 - 1.9 %    Platelet Estimate Appears normal     Differential Method Automated    Protime-INR    Collection Time: 11/19/23  6:09 AM   Result Value Ref Range    PT 23.9 (H) 12.2 - 14.6 sec    INR 2.1 (H) <1.2   Comprehensive Metabolic Panel    Collection Time: 11/19/23  6:09 AM   Result Value Ref Range    Sodium 137 136 - 145 mmol/L    Potassium 3.8 3.5 - 5.1 mmol/L    Chloride 109 95 - 110 mmol/L    CO2 21 (L) 23 - 29 mmol/L    Glucose 152 (H) 74 - 106 mg/dL    BUN 16 9 - 20 mg/dL    Creatinine 0.81 0.80 - 1.50 mg/dL    Calcium 8.1 (L) 8.4 - 10.2 mg/dL    Total Protein 7.1 6.3 - 8.2 g/dL    Albumin 3.0 (L) 3.5 - 5.2 g/dL    Total Bilirubin 2.5 (H) 0.2 - 1.3 mg/dL    Alkaline Phosphatase 136 38 - 145 U/L    AST 54 17 - 59 U/L    ALT 43 10 - 44 U/L    Anion Gap 7 (L) 8 - 16 mmol/L    eGFR >60 >60 mL/min/1.73 m^2   CBC Auto Differential    Collection Time: 11/19/23  6:09 AM   Result Value Ref Range    WBC 12.80 (H) 3.90 - 12.70 K/uL    RBC 3.55 (L) 4.60 - 6.20 M/uL    Hemoglobin 12.1 (L) 14.0 - 18.0 g/dL    Hematocrit 35.0 (L) 40.0 - 54.0 %    MCV 99 (H) 82 - 98 fL    MCH 34.1 (H) 27.0 - 31.0 pg    MCHC 34.6 32.0 - 36.0 g/dL    RDW 14.2 11.5 - 14.5 %    Platelets 235 150 - 450 K/uL    MPV 8.0 7.4 - 10.4 fL    Gran # (ANC) 9.2 (H) 1.8 - 7.7 K/uL    Lymph # 2.5 1.0 - 4.8 K/uL    Mono # 1.0 0.3 - 1.0 K/uL    Eos # 0.1 0.0 - 0.5 K/uL    Baso # 0.10 0.00 - 0.20 K/uL    nRBC 0 0 /100 WBC    Gran % 72.0 38.0 - 73.0 %    Lymph % 19.5 18.0 - 48.0 %    Mono % 7.5 4.0 - 15.0 %    Eosinophil % 0.5 0.0 - 8.0 %    Basophil % 0.5 0.0 - 1.9 %    Differential Method Automated    C-Reactive Protein    Collection Time: 11/19/23  6:09 AM   Result Value Ref Range    CRP 71.4 (H) 0.0 - 10.0 mg/L   Procalcitonin    Collection Time: 11/19/23  6:09 AM   Result Value Ref Range     Procalcitonin 1.40 (H) <=0.25 ng/mL   Throat culture    Collection Time: 11/19/23 11:58 AM    Specimen: Throat   Result Value Ref Range    RESPIRATORY CULTURE - THROAT Normal respiratory olena    Protime-INR    Collection Time: 11/20/23  6:17 AM   Result Value Ref Range    PT 25.6 (H) 12.2 - 14.6 sec    INR 2.3 (H) <1.2   Comprehensive Metabolic Panel    Collection Time: 11/20/23  6:17 AM   Result Value Ref Range    Sodium 136 136 - 145 mmol/L    Potassium 4.0 3.5 - 5.1 mmol/L    Chloride 108 95 - 110 mmol/L    CO2 23 23 - 29 mmol/L    Glucose 138 (H) 74 - 106 mg/dL    BUN 14 9 - 20 mg/dL    Creatinine 0.80 0.80 - 1.50 mg/dL    Calcium 7.8 (L) 8.4 - 10.2 mg/dL    Total Protein 7.8 6.3 - 8.2 g/dL    Albumin 3.3 (L) 3.5 - 5.2 g/dL    Total Bilirubin 2.4 (H) 0.2 - 1.3 mg/dL    Alkaline Phosphatase 140 38 - 145 U/L    AST 57 17 - 59 U/L    ALT 38 10 - 44 U/L    Anion Gap 5 (L) 8 - 16 mmol/L    eGFR >60 >60 mL/min/1.73 m^2   CBC Auto Differential    Collection Time: 11/20/23  6:17 AM   Result Value Ref Range    WBC 12.70 3.90 - 12.70 K/uL    RBC 3.45 (L) 4.60 - 6.20 M/uL    Hemoglobin 11.7 (L) 14.0 - 18.0 g/dL    Hematocrit 33.6 (L) 40.0 - 54.0 %    MCV 97 82 - 98 fL    MCH 33.8 (H) 27.0 - 31.0 pg    MCHC 34.7 32.0 - 36.0 g/dL    RDW 14.0 11.5 - 14.5 %    Platelets 259 150 - 450 K/uL    MPV 7.6 7.4 - 10.4 fL    Gran # (ANC) 9.5 (H) 1.8 - 7.7 K/uL    Lymph # 2.2 1.0 - 4.8 K/uL    Mono # 0.9 0.3 - 1.0 K/uL    Eos # 0.0 0.0 - 0.5 K/uL    Baso # 0.00 0.00 - 0.20 K/uL    nRBC 0 0 /100 WBC    Gran % 75.0 (H) 38.0 - 73.0 %    Lymph % 17.2 (L) 18.0 - 48.0 %    Mono % 7.2 4.0 - 15.0 %    Eosinophil % 0.3 0.0 - 8.0 %    Basophil % 0.3 0.0 - 1.9 %    Differential Method Automated    Sedimentation rate    Collection Time: 11/20/23  6:17 AM   Result Value Ref Range    Sed Rate 55 (H) 0 - 20 mm/Hr   Protime-INR    Collection Time: 11/21/23  6:20 AM   Result Value Ref Range    PT 30.2 (H) 12.2 - 14.6 sec    INR 2.9 (H) <1.2    Comprehensive Metabolic Panel    Collection Time: 11/21/23  6:20 AM   Result Value Ref Range    Sodium 136 136 - 145 mmol/L    Potassium 4.0 3.5 - 5.1 mmol/L    Chloride 108 95 - 110 mmol/L    CO2 22 (L) 23 - 29 mmol/L    Glucose 135 (H) 74 - 106 mg/dL    BUN 13 9 - 20 mg/dL    Creatinine 0.82 0.80 - 1.50 mg/dL    Calcium 7.8 (L) 8.4 - 10.2 mg/dL    Total Protein 8.2 6.3 - 8.2 g/dL    Albumin 3.3 (L) 3.5 - 5.2 g/dL    Total Bilirubin 2.1 (H) 0.2 - 1.3 mg/dL    Alkaline Phosphatase 145 38 - 145 U/L    AST 52 17 - 59 U/L    ALT 37 10 - 44 U/L    Anion Gap 6 (L) 8 - 16 mmol/L    eGFR >60 >60 mL/min/1.73 m^2   CBC Auto Differential    Collection Time: 11/21/23  6:20 AM   Result Value Ref Range    WBC 11.80 3.90 - 12.70 K/uL    RBC 3.46 (L) 4.60 - 6.20 M/uL    Hemoglobin 11.9 (L) 14.0 - 18.0 g/dL    Hematocrit 33.5 (L) 40.0 - 54.0 %    MCV 97 82 - 98 fL    MCH 34.4 (H) 27.0 - 31.0 pg    MCHC 35.5 32.0 - 36.0 g/dL    RDW 14.0 11.5 - 14.5 %    Platelets 274 150 - 450 K/uL    MPV 7.5 7.4 - 10.4 fL    Gran # (ANC) 8.6 (H) 1.8 - 7.7 K/uL    Lymph # 2.2 1.0 - 4.8 K/uL    Mono # 0.9 0.3 - 1.0 K/uL    Eos # 0.0 0.0 - 0.5 K/uL    Baso # 0.00 0.00 - 0.20 K/uL    nRBC 0 0 /100 WBC    Gran % 72.7 38.0 - 73.0 %    Lymph % 18.7 18.0 - 48.0 %    Mono % 8.0 4.0 - 15.0 %    Eosinophil % 0.3 0.0 - 8.0 %    Basophil % 0.3 0.0 - 1.9 %    Differential Method Automated    Protime-INR    Collection Time: 11/22/23  5:52 AM   Result Value Ref Range    PT 33.7 (H) 12.2 - 14.6 sec    INR 3.3 (H) <1.2   Comprehensive Metabolic Panel    Collection Time: 11/22/23  5:52 AM   Result Value Ref Range    Sodium 136 136 - 145 mmol/L    Potassium 4.0 3.5 - 5.1 mmol/L    Chloride 108 95 - 110 mmol/L    CO2 21 (L) 23 - 29 mmol/L    Glucose 130 (H) 74 - 106 mg/dL    BUN 14 9 - 20 mg/dL    Creatinine 0.80 0.80 - 1.50 mg/dL    Calcium 7.9 (L) 8.4 - 10.2 mg/dL    Total Protein 8.2 6.3 - 8.2 g/dL    Albumin 3.1 (L) 3.5 - 5.2 g/dL    Total Bilirubin 1.9 (H) 0.2 -  1.3 mg/dL    Alkaline Phosphatase 158 (H) 38 - 145 U/L    AST 67 (H) 17 - 59 U/L    ALT 37 10 - 44 U/L    Anion Gap 7 (L) 8 - 16 mmol/L    eGFR >60 >60 mL/min/1.73 m^2   CBC Auto Differential    Collection Time: 11/22/23  5:52 AM   Result Value Ref Range    WBC 11.70 3.90 - 12.70 K/uL    RBC 3.56 (L) 4.60 - 6.20 M/uL    Hemoglobin 12.0 (L) 14.0 - 18.0 g/dL    Hematocrit 34.6 (L) 40.0 - 54.0 %    MCV 97 82 - 98 fL    MCH 33.7 (H) 27.0 - 31.0 pg    MCHC 34.7 32.0 - 36.0 g/dL    RDW 14.0 11.5 - 14.5 %    Platelets 295 150 - 450 K/uL    MPV 7.4 7.4 - 10.4 fL    Gran # (ANC) 8.4 (H) 1.8 - 7.7 K/uL    Lymph # 2.3 1.0 - 4.8 K/uL    Mono # 1.0 0.3 - 1.0 K/uL    Eos # 0.0 0.0 - 0.5 K/uL    Baso # 0.00 0.00 - 0.20 K/uL    nRBC 0 0 /100 WBC    Gran % 71.9 38.0 - 73.0 %    Lymph % 19.4 18.0 - 48.0 %    Mono % 8.3 4.0 - 15.0 %    Eosinophil % 0.1 0.0 - 8.0 %    Basophil % 0.3 0.0 - 1.9 %    Differential Method Automated    C-Reactive Protein    Collection Time: 11/29/23  9:06 AM   Result Value Ref Range    CRP 3.97 (H) 0.00 - 0.75 mg/dL   Sedimentation rate    Collection Time: 11/29/23  9:06 AM   Result Value Ref Range    Sed Rate 85 (H) 0 - 10 mm/Hr   C-Reactive Protein    Collection Time: 11/29/23  9:06 AM   Result Value Ref Range    CRP 3.97 (H) 0.00 - 0.75 mg/dL   Sedimentation rate    Collection Time: 11/29/23  9:06 AM   Result Value Ref Range    Sed Rate 85 (H) 0 - 10 mm/Hr   C-Reactive Protein    Collection Time: 11/29/23  9:06 AM   Result Value Ref Range    CRP 3.97 (H) 0.00 - 0.75 mg/dL   Sedimentation rate    Collection Time: 11/29/23  9:06 AM   Result Value Ref Range    Sed Rate 85 (H) 0 - 10 mm/Hr   C-Reactive Protein    Collection Time: 11/29/23  9:06 AM   Result Value Ref Range    CRP 3.97 (H) 0.00 - 0.75 mg/dL   Sedimentation rate    Collection Time: 11/29/23  9:06 AM   Result Value Ref Range    Sed Rate 85 (H) 0 - 10 mm/Hr     Results for orders placed during the hospital encounter of  11/12/23    Echo    Interpretation Summary    Atrial fibrillation with rvr is noted during this study.    Left Ventricle: The left ventricle is normal in size. Normal wall thickness. Normal wall motion. There is normal systolic function with a visually estimated ejection fraction of 55 - 70%.    Right Ventricle: Normal right ventricular cavity size. Systolic function is normal.    Left Atrium: Left atrium is mildly dilated.    Aortic Valve: There is a mechanical valve in the aortic position that is well-seated.    No pericardial effusion.    Kazumi G Yoshinaga, DO [facility]

## 2023-12-02 NOTE — ASSESSMENT & PLAN NOTE
Wt Readings from Last 8 Encounters:   11/30/23 125.6 kg (277 lb)   11/22/23 127.1 kg (280 lb 1.6 oz)   11/12/23 (!) 137.6 kg (303 lb 6.4 oz)   07/29/23 (!) 139.7 kg (308 lb)   09/29/18 131.1 kg (289 lb)   09/07/18 130.4 kg (287 lb 7.7 oz)   Recommendations:   Stay physically active. As tolerated alternate resistance training with stretching and cardio. Goal of 150 minutes per week of moderate intensity activity or 7,500 - 10,000 steps per day. Follow the Mediterranean Diet. Include whole fresh fruits, vegetables, olive oil, seeds, nuts, whole grains, cold water fish, salmon, mackerel and lean cuts of meat.  Do not drink sugary/diet carbonated beverages. Decrease portion sizes slightly which will result in an approximately 500-calorie deficit. Avoid fast or fried and processed food, especially canned foods. Avoid refined carbohydrates, white starchy foods, flour, white potato, bread, muffins, and cakes. Consider substituting one meal a day with a meal replacement such as Slim fast, lean cuisine, or weight watcher's. Follow a healthy diet that includes enough calcium, vitamin D and proteins for bone health.

## 2023-12-02 NOTE — ASSESSMENT & PLAN NOTE
S/p successful cardioversion on 11/17/23. HR 85. Denies symptoms.   Current meds include,   - amiodarone 200 mg daily  - metoprolol succinate 25 mg daily  - f/u primary cardiology notes

## 2023-12-02 NOTE — ASSESSMENT & PLAN NOTE
Current regimen includes;   - fenofibrate 135 mg with breakfast  - counseled on increasing dietary intake of natural fibers through whole vegetables and fruits  - avoid canned and processed food items  - f/u lipid panel  - f/u [facility] cardiology notes

## 2023-12-06 PROBLEM — Z86.79 HISTORY OF ATRIAL FIBRILLATION: Status: ACTIVE | Noted: 2023-11-14

## 2023-12-27 ENCOUNTER — LAB VISIT (OUTPATIENT)
Dept: LAB | Facility: HOSPITAL | Age: 63
End: 2023-12-27
Attending: INTERNAL MEDICINE
Payer: COMMERCIAL

## 2023-12-27 DIAGNOSIS — R78.81 STREPTOCOCCAL BACTEREMIA: ICD-10-CM

## 2023-12-27 DIAGNOSIS — B95.5 STREPTOCOCCAL BACTEREMIA: ICD-10-CM

## 2023-12-27 LAB
CRP SERPL-MCNC: 1.41 MG/DL (ref 0–0.75)
ERYTHROCYTE [SEDIMENTATION RATE] IN BLOOD: 38 MM/HR (ref 0–10)

## 2023-12-27 PROCEDURE — 36415 COLL VENOUS BLD VENIPUNCTURE: CPT | Performed by: INTERNAL MEDICINE

## 2023-12-27 PROCEDURE — 87040 BLOOD CULTURE FOR BACTERIA: CPT | Performed by: INTERNAL MEDICINE

## 2023-12-27 PROCEDURE — 86140 C-REACTIVE PROTEIN: CPT | Performed by: INTERNAL MEDICINE

## 2023-12-27 PROCEDURE — 85651 RBC SED RATE NONAUTOMATED: CPT | Performed by: INTERNAL MEDICINE

## 2024-01-01 LAB — BACTERIA BLD CULT: NORMAL

## 2024-01-09 ENCOUNTER — LAB VISIT (OUTPATIENT)
Dept: LAB | Facility: HOSPITAL | Age: 64
End: 2024-01-09
Attending: INTERNAL MEDICINE
Payer: COMMERCIAL

## 2024-01-09 DIAGNOSIS — B95.5 STREPTOCOCCAL BACTEREMIA: ICD-10-CM

## 2024-01-09 DIAGNOSIS — R78.81 STREPTOCOCCAL BACTEREMIA: ICD-10-CM

## 2024-01-09 LAB
CRP SERPL-MCNC: 1.36 MG/DL (ref 0–0.75)
ERYTHROCYTE [SEDIMENTATION RATE] IN BLOOD: 26 MM/HR (ref 0–10)

## 2024-01-09 PROCEDURE — 87040 BLOOD CULTURE FOR BACTERIA: CPT | Performed by: INTERNAL MEDICINE

## 2024-01-09 PROCEDURE — 36415 COLL VENOUS BLD VENIPUNCTURE: CPT | Performed by: INTERNAL MEDICINE

## 2024-01-09 PROCEDURE — 86140 C-REACTIVE PROTEIN: CPT | Performed by: INTERNAL MEDICINE

## 2024-01-09 PROCEDURE — 85651 RBC SED RATE NONAUTOMATED: CPT | Performed by: INTERNAL MEDICINE

## 2024-01-14 LAB — BACTERIA BLD CULT: NORMAL

## 2024-01-22 PROBLEM — R65.20 SEVERE SEPSIS: Status: RESOLVED | Noted: 2018-08-30 | Resolved: 2024-01-22

## 2024-01-22 PROBLEM — A41.9 SEVERE SEPSIS: Status: RESOLVED | Noted: 2018-08-30 | Resolved: 2024-01-22

## 2024-01-22 PROBLEM — N17.9 AKI (ACUTE KIDNEY INJURY): Status: RESOLVED | Noted: 2023-11-13 | Resolved: 2024-01-22

## 2024-03-20 ENCOUNTER — LAB VISIT (OUTPATIENT)
Dept: LAB | Facility: HOSPITAL | Age: 64
End: 2024-03-20
Payer: COMMERCIAL

## 2024-03-20 DIAGNOSIS — I48.91 ATRIAL FIBRILLATION: Primary | ICD-10-CM

## 2024-03-20 LAB
INR PPP: 2.4 (ref 0.8–1.2)
PROTHROMBIN TIME: 23.9 SEC (ref 9–12.5)

## 2024-03-20 PROCEDURE — 85610 PROTHROMBIN TIME: CPT

## 2024-03-20 PROCEDURE — 36415 COLL VENOUS BLD VENIPUNCTURE: CPT

## 2024-03-21 PROBLEM — R94.31 PROLONGED Q-T INTERVAL ON ECG: Status: ACTIVE | Noted: 2024-03-21

## 2024-03-21 PROBLEM — E87.6 HYPOKALEMIA: Status: ACTIVE | Noted: 2024-03-21

## 2024-03-22 PROBLEM — E80.6 HYPERBILIRUBINEMIA: Status: ACTIVE | Noted: 2024-03-22

## 2024-04-01 PROBLEM — I48.19 PERSISTENT ATRIAL FIBRILLATION: Status: ACTIVE | Noted: 2024-04-01

## 2024-12-06 ENCOUNTER — HOSPITAL ENCOUNTER (INPATIENT)
Facility: HOSPITAL | Age: 64
LOS: 4 days | Discharge: HOME | DRG: 871 | End: 2024-12-10
Attending: INTERNAL MEDICINE | Admitting: STUDENT IN AN ORGANIZED HEALTH CARE EDUCATION/TRAINING PROGRAM
Payer: COMMERCIAL

## 2024-12-06 DIAGNOSIS — G93.41 SEPSIS WITH ENCEPHALOPATHY WITHOUT SEPTIC SHOCK: ICD-10-CM

## 2024-12-06 DIAGNOSIS — R65.20 SEPSIS WITH ENCEPHALOPATHY WITHOUT SEPTIC SHOCK: ICD-10-CM

## 2024-12-06 DIAGNOSIS — R41.82 ALTERED MENTAL STATUS, UNSPECIFIED ALTERED MENTAL STATUS TYPE: Primary | ICD-10-CM

## 2024-12-06 DIAGNOSIS — R73.9 HYPERGLYCEMIA: ICD-10-CM

## 2024-12-06 DIAGNOSIS — R41.89 UNRESPONSIVE: ICD-10-CM

## 2024-12-06 DIAGNOSIS — J18.9 PNEUMONIA OF LEFT LOWER LOBE DUE TO INFECTIOUS ORGANISM: ICD-10-CM

## 2024-12-06 DIAGNOSIS — R56.9 NEW ONSET SEIZURE: ICD-10-CM

## 2024-12-06 DIAGNOSIS — I48.19 PERSISTENT ATRIAL FIBRILLATION: ICD-10-CM

## 2024-12-06 DIAGNOSIS — I49.9 ABNORMAL HEART RHYTHM: ICD-10-CM

## 2024-12-06 DIAGNOSIS — I48.91 ATRIAL FIBRILLATION WITH RVR: ICD-10-CM

## 2024-12-06 DIAGNOSIS — R07.9 CHEST PAIN: ICD-10-CM

## 2024-12-06 DIAGNOSIS — Z95.2 MECHANICAL HEART VALVE PRESENT: ICD-10-CM

## 2024-12-06 DIAGNOSIS — A41.9 SEPSIS WITH ENCEPHALOPATHY WITHOUT SEPTIC SHOCK: ICD-10-CM

## 2024-12-06 DIAGNOSIS — Z97.8 ENDOTRACHEAL TUBE PRESENT: ICD-10-CM

## 2024-12-06 DIAGNOSIS — A41.9 SEPSIS, DUE TO UNSPECIFIED ORGANISM, UNSPECIFIED WHETHER ACUTE ORGAN DYSFUNCTION PRESENT: ICD-10-CM

## 2024-12-06 LAB
AC: 14
ACETONE BLD-MCNC: NEGATIVE MG/DL
ALBUMIN SERPL BCP-MCNC: 3.8 G/DL (ref 3.5–5.2)
ALP SERPL-CCNC: 103 U/L (ref 55–135)
ALT SERPL W/O P-5'-P-CCNC: 37 U/L (ref 10–44)
AMORPH CRY UR QL COMP ASSIST: ABNORMAL
AMPHET+METHAMPHET UR QL: NEGATIVE
ANION GAP SERPL CALC-SCNC: 25 MMOL/L (ref 3–11)
APTT PPP: 39 SEC (ref 21–32)
AST SERPL-CCNC: 31 U/L (ref 10–40)
BACTERIA #/AREA URNS HPF: NEGATIVE /HPF
BACTERIA #/AREA URNS HPF: NEGATIVE /HPF
BARBITURATES UR QL SCN>200 NG/ML: NEGATIVE
BASOPHILS # BLD AUTO: 0.11 K/UL (ref 0–0.2)
BASOPHILS NFR BLD: 0.5 % (ref 0–1.9)
BENZODIAZ UR QL SCN>200 NG/ML: NEGATIVE
BILIRUB SERPL-MCNC: 1.4 MG/DL (ref 0.1–1)
BILIRUB UR QL STRIP: NEGATIVE
BILIRUB UR QL STRIP: NEGATIVE
BUN SERPL-MCNC: 18 MG/DL (ref 8–23)
BZE UR QL SCN: NEGATIVE
CALCIUM SERPL-MCNC: 9.4 MG/DL (ref 8.7–10.5)
CANNABINOIDS UR QL SCN: NEGATIVE
CHLORIDE SERPL-SCNC: 102 MMOL/L (ref 95–110)
CLARITY UR: ABNORMAL
CLARITY UR: CLEAR
CO2 SERPL-SCNC: 12 MMOL/L (ref 23–29)
COLOR UR: ABNORMAL
COLOR UR: YELLOW
CREAT SERPL-MCNC: 2 MG/DL (ref 0.5–1.4)
CREAT UR-MCNC: 43.4 MG/DL (ref 23–375)
CREAT UR-MCNC: 44.2 MG/DL (ref 23–375)
CTP QC/QA: YES
CTP QC/QA: YES
DIFFERENTIAL METHOD BLD: ABNORMAL
EOSINOPHIL # BLD AUTO: 0 K/UL (ref 0–0.5)
EOSINOPHIL NFR BLD: 0.1 % (ref 0–8)
ERYTHROCYTE [DISTWIDTH] IN BLOOD BY AUTOMATED COUNT: 13.8 % (ref 11.5–14.5)
EST. GFR  (NO RACE VARIABLE): 37 ML/MIN/1.73 M^2
ESTIMATED AVG GLUCOSE: 186 MG/DL (ref 68–131)
FIO2: 100 %
GLUCOSE SERPL-MCNC: 474 MG/DL (ref 70–110)
GLUCOSE UR QL STRIP: ABNORMAL
GLUCOSE UR QL STRIP: ABNORMAL
HBA1C MFR BLD: 8.1 % (ref 4–5.6)
HCT VFR BLD AUTO: 46.9 % (ref 40–54)
HGB BLD-MCNC: 16.3 G/DL (ref 14–18)
HGB UR QL STRIP: ABNORMAL
HGB UR QL STRIP: ABNORMAL
HYALINE CASTS #/AREA URNS LPF: 0.7 /LPF
HYALINE CASTS #/AREA URNS LPF: 1.5 /LPF
IMM GRANULOCYTES # BLD AUTO: 0.28 K/UL (ref 0–0.04)
IMM GRANULOCYTES NFR BLD AUTO: 1.4 % (ref 0–0.5)
INR PPP: 2.7 (ref 0.8–1.2)
KETONES UR QL STRIP: ABNORMAL
KETONES UR QL STRIP: NEGATIVE
LACTATE SERPL-SCNC: 1.9 MMOL/L (ref 0.5–2.2)
LACTATE SERPL-SCNC: 2.5 MMOL/L (ref 0.5–2.2)
LACTATE SERPL-SCNC: 9.2 MMOL/L (ref 0.5–2.2)
LEUKOCYTE ESTERASE UR QL STRIP: NEGATIVE
LEUKOCYTE ESTERASE UR QL STRIP: NEGATIVE
LYMPHOCYTES # BLD AUTO: 4.7 K/UL (ref 1–4.8)
LYMPHOCYTES NFR BLD: 23.5 % (ref 18–48)
Lab: ABNORMAL
MCH RBC QN AUTO: 36 PG (ref 27–31)
MCHC RBC AUTO-ENTMCNC: 34.8 G/DL (ref 32–36)
MCV RBC AUTO: 104 FL (ref 82–98)
METHADONE UR QL SCN>300 NG/ML: NEGATIVE
MICROSCOPIC COMMENT: ABNORMAL
MICROSCOPIC COMMENT: ABNORMAL
MODIFIED ALLEN'S TEST: ABNORMAL
MONOCYTES # BLD AUTO: 1.9 K/UL (ref 0.3–1)
MONOCYTES NFR BLD: 9.7 % (ref 4–15)
NEUTROPHILS # BLD AUTO: 12.9 K/UL (ref 1.8–7.7)
NEUTROPHILS NFR BLD: 64.8 % (ref 38–73)
NITRITE UR QL STRIP: NEGATIVE
NITRITE UR QL STRIP: NEGATIVE
NOTIFIED BY: ABNORMAL
NRBC BLD-RTO: 0 /100 WBC
NT-PROBNP SERPL-MCNC: 1357 PG/ML (ref 5–900)
O2DEVICE: ABNORMAL
OHS QRS DURATION: 106 MS
OHS QTC CALCULATION: 482 MS
OPIATES UR QL SCN: NEGATIVE
PCO2 BLDA: 40.1 MMHG (ref 35–45)
PCP UR QL SCN>25 NG/ML: NEGATIVE
PEAK FLOW: 60
PEEP: 5
PH SMN: 7.2 [PH] (ref 7.34–7.45)
PH UR STRIP: 5 [PH] (ref 5–8)
PH UR STRIP: 5 [PH] (ref 5–8)
PLATELET # BLD AUTO: 193 K/UL (ref 150–450)
PMV BLD AUTO: 9.6 FL (ref 9.2–12.9)
PO2 BLDA: 128 MMHG (ref 80–100)
POC BASE DEFICIT: -12.3 MMOL/L (ref -2–2)
POC HCO3: 15.7 MMOL/L (ref 24–28)
POC MOLECULAR INFLUENZA A AGN: NEGATIVE
POC MOLECULAR INFLUENZA B AGN: NEGATIVE
POC NOTIFIED NOTE: ABNORMAL
POC PERFORMED BY: ABNORMAL
POC SATURATED O2: 99.4 % (ref 95–100)
POC TEMPERATURE: 37 C
POCT GLUCOSE: 260 MG/DL (ref 70–110)
POCT GLUCOSE: 295 MG/DL (ref 70–110)
POCT GLUCOSE: 364 MG/DL (ref 70–110)
POTASSIUM SERPL-SCNC: 4.3 MMOL/L (ref 3.5–5.1)
PROT SERPL-MCNC: 8.5 G/DL (ref 6–8.4)
PROT UR QL STRIP: ABNORMAL
PROT UR QL STRIP: ABNORMAL
PROT UR-MCNC: 18.6 MG/DL (ref 0–15)
PROT/CREAT UR: 0.42 MG/G{CREAT} (ref 0–0.2)
PROTHROMBIN TIME: 27.2 SEC (ref 9–12.5)
PROVIDER NOTIFIED: ABNORMAL
RBC # BLD AUTO: 4.53 M/UL (ref 4.6–6.2)
RBC #/AREA URNS HPF: 2 /HPF (ref 0–4)
RBC #/AREA URNS HPF: 31 /HPF (ref 0–4)
SARS-COV-2 RDRP RESP QL NAA+PROBE: NEGATIVE
SODIUM SERPL-SCNC: 139 MMOL/L (ref 136–145)
SP GR UR STRIP: >=1.03 (ref 1–1.03)
SP GR UR STRIP: >=1.03 (ref 1–1.03)
SPECIMEN SOURCE: ABNORMAL
SQUAMOUS #/AREA URNS HPF: 1 /HPF
SQUAMOUS #/AREA URNS HPF: 1 /HPF
TOXICOLOGY INFORMATION: NORMAL
TROPONIN I SERPL DL<=0.01 NG/ML-MCNC: 15.6 PG/ML (ref 0–60)
URN SPEC COLLECT METH UR: ABNORMAL
URN SPEC COLLECT METH UR: ABNORMAL
UROBILINOGEN UR STRIP-ACNC: NEGATIVE EU/DL
UROBILINOGEN UR STRIP-ACNC: NEGATIVE EU/DL
VT: 600
WBC # BLD AUTO: 20.01 K/UL (ref 3.9–12.7)
WBC #/AREA URNS HPF: 1 /HPF (ref 0–5)
WBC #/AREA URNS HPF: 3 /HPF (ref 0–5)
YEAST URNS QL MICRO: ABNORMAL
YEAST URNS QL MICRO: ABNORMAL

## 2024-12-06 PROCEDURE — 63600175 PHARM REV CODE 636 W HCPCS: Performed by: INTERNAL MEDICINE

## 2024-12-06 PROCEDURE — 25000003 PHARM REV CODE 250: Performed by: INTERNAL MEDICINE

## 2024-12-06 PROCEDURE — 99900026 HC AIRWAY MAINTENANCE (STAT)

## 2024-12-06 PROCEDURE — 81000 URINALYSIS NONAUTO W/SCOPE: CPT | Mod: 91,59 | Performed by: INTERNAL MEDICINE

## 2024-12-06 PROCEDURE — 96365 THER/PROPH/DIAG IV INF INIT: CPT

## 2024-12-06 PROCEDURE — 0BH17EZ INSERTION OF ENDOTRACHEAL AIRWAY INTO TRACHEA, VIA NATURAL OR ARTIFICIAL OPENING: ICD-10-PCS | Performed by: INTERNAL MEDICINE

## 2024-12-06 PROCEDURE — 99900035 HC TECH TIME PER 15 MIN (STAT)

## 2024-12-06 PROCEDURE — 96375 TX/PRO/DX INJ NEW DRUG ADDON: CPT

## 2024-12-06 PROCEDURE — 20000000 HC ICU ROOM

## 2024-12-06 PROCEDURE — 93010 ELECTROCARDIOGRAM REPORT: CPT | Mod: ,,, | Performed by: INTERNAL MEDICINE

## 2024-12-06 PROCEDURE — 94761 N-INVAS EAR/PLS OXIMETRY MLT: CPT

## 2024-12-06 PROCEDURE — 87635 SARS-COV-2 COVID-19 AMP PRB: CPT | Performed by: INTERNAL MEDICINE

## 2024-12-06 PROCEDURE — 36600 WITHDRAWAL OF ARTERIAL BLOOD: CPT

## 2024-12-06 PROCEDURE — 82803 BLOOD GASES ANY COMBINATION: CPT

## 2024-12-06 PROCEDURE — 27100108

## 2024-12-06 PROCEDURE — 82570 ASSAY OF URINE CREATININE: CPT | Performed by: INTERNAL MEDICINE

## 2024-12-06 PROCEDURE — 99900031 HC PATIENT EDUCATION (STAT)

## 2024-12-06 PROCEDURE — 82009 KETONE BODYS QUAL: CPT | Performed by: INTERNAL MEDICINE

## 2024-12-06 PROCEDURE — 87040 BLOOD CULTURE FOR BACTERIA: CPT | Performed by: INTERNAL MEDICINE

## 2024-12-06 PROCEDURE — 94799 UNLISTED PULMONARY SVC/PX: CPT

## 2024-12-06 PROCEDURE — 63600175 PHARM REV CODE 636 W HCPCS: Performed by: STUDENT IN AN ORGANIZED HEALTH CARE EDUCATION/TRAINING PROGRAM

## 2024-12-06 PROCEDURE — 99223 1ST HOSP IP/OBS HIGH 75: CPT | Mod: ,,, | Performed by: STUDENT IN AN ORGANIZED HEALTH CARE EDUCATION/TRAINING PROGRAM

## 2024-12-06 PROCEDURE — 87070 CULTURE OTHR SPECIMN AEROBIC: CPT | Performed by: INTERNAL MEDICINE

## 2024-12-06 PROCEDURE — 85610 PROTHROMBIN TIME: CPT | Performed by: INTERNAL MEDICINE

## 2024-12-06 PROCEDURE — 80053 COMPREHEN METABOLIC PANEL: CPT | Performed by: INTERNAL MEDICINE

## 2024-12-06 PROCEDURE — 87205 SMEAR GRAM STAIN: CPT | Performed by: INTERNAL MEDICINE

## 2024-12-06 PROCEDURE — 81000 URINALYSIS NONAUTO W/SCOPE: CPT | Performed by: INTERNAL MEDICINE

## 2024-12-06 PROCEDURE — 27100171 HC OXYGEN HIGH FLOW UP TO 24 HOURS

## 2024-12-06 PROCEDURE — 87077 CULTURE AEROBIC IDENTIFY: CPT | Performed by: INTERNAL MEDICINE

## 2024-12-06 PROCEDURE — 99285 EMERGENCY DEPT VISIT HI MDM: CPT | Mod: 25

## 2024-12-06 PROCEDURE — 84484 ASSAY OF TROPONIN QUANT: CPT | Performed by: INTERNAL MEDICINE

## 2024-12-06 PROCEDURE — 83036 HEMOGLOBIN GLYCOSYLATED A1C: CPT | Performed by: STUDENT IN AN ORGANIZED HEALTH CARE EDUCATION/TRAINING PROGRAM

## 2024-12-06 PROCEDURE — 87186 SC STD MICRODIL/AGAR DIL: CPT | Performed by: INTERNAL MEDICINE

## 2024-12-06 PROCEDURE — 27200966 HC CLOSED SUCTION SYSTEM

## 2024-12-06 PROCEDURE — 83605 ASSAY OF LACTIC ACID: CPT | Performed by: INTERNAL MEDICINE

## 2024-12-06 PROCEDURE — 85730 THROMBOPLASTIN TIME PARTIAL: CPT | Performed by: INTERNAL MEDICINE

## 2024-12-06 PROCEDURE — 87502 INFLUENZA DNA AMP PROBE: CPT

## 2024-12-06 PROCEDURE — 36415 COLL VENOUS BLD VENIPUNCTURE: CPT | Mod: XB | Performed by: STUDENT IN AN ORGANIZED HEALTH CARE EDUCATION/TRAINING PROGRAM

## 2024-12-06 PROCEDURE — 93005 ELECTROCARDIOGRAM TRACING: CPT

## 2024-12-06 PROCEDURE — 5A1945Z RESPIRATORY VENTILATION, 24-96 CONSECUTIVE HOURS: ICD-10-PCS | Performed by: STUDENT IN AN ORGANIZED HEALTH CARE EDUCATION/TRAINING PROGRAM

## 2024-12-06 PROCEDURE — 80307 DRUG TEST PRSMV CHEM ANLYZR: CPT | Performed by: INTERNAL MEDICINE

## 2024-12-06 PROCEDURE — 94002 VENT MGMT INPAT INIT DAY: CPT

## 2024-12-06 PROCEDURE — 83605 ASSAY OF LACTIC ACID: CPT | Mod: 91 | Performed by: STUDENT IN AN ORGANIZED HEALTH CARE EDUCATION/TRAINING PROGRAM

## 2024-12-06 PROCEDURE — 83880 ASSAY OF NATRIURETIC PEPTIDE: CPT | Performed by: STUDENT IN AN ORGANIZED HEALTH CARE EDUCATION/TRAINING PROGRAM

## 2024-12-06 PROCEDURE — 36415 COLL VENOUS BLD VENIPUNCTURE: CPT | Performed by: INTERNAL MEDICINE

## 2024-12-06 PROCEDURE — 85025 COMPLETE CBC W/AUTO DIFF WBC: CPT | Performed by: INTERNAL MEDICINE

## 2024-12-06 RX ORDER — DILTIAZEM HCL 1 MG/ML
5 INJECTION, SOLUTION INTRAVENOUS
Status: COMPLETED | OUTPATIENT
Start: 2024-12-06 | End: 2024-12-06

## 2024-12-06 RX ORDER — MUPIROCIN 20 MG/G
OINTMENT TOPICAL 2 TIMES DAILY
Status: DISCONTINUED | OUTPATIENT
Start: 2024-12-06 | End: 2024-12-10 | Stop reason: HOSPADM

## 2024-12-06 RX ORDER — CEFTRIAXONE 1 G/1
1 INJECTION, POWDER, FOR SOLUTION INTRAMUSCULAR; INTRAVENOUS
Status: DISCONTINUED | OUTPATIENT
Start: 2024-12-06 | End: 2024-12-06

## 2024-12-06 RX ORDER — DILTIAZEM HYDROCHLORIDE 5 MG/ML
25 INJECTION INTRAVENOUS
Status: COMPLETED | OUTPATIENT
Start: 2024-12-06 | End: 2024-12-06

## 2024-12-06 RX ORDER — LEVETIRACETAM 500 MG/5ML
500 INJECTION, SOLUTION, CONCENTRATE INTRAVENOUS EVERY 12 HOURS
Status: DISCONTINUED | OUTPATIENT
Start: 2024-12-06 | End: 2024-12-08

## 2024-12-06 RX ORDER — VANCOMYCIN 2 GRAM/500 ML IN 0.9 % SODIUM CHLORIDE INTRAVENOUS
2000
Status: COMPLETED | OUTPATIENT
Start: 2024-12-06 | End: 2024-12-06

## 2024-12-06 RX ORDER — ETOMIDATE 2 MG/ML
INJECTION INTRAVENOUS CODE/TRAUMA/SEDATION MEDICATION
Status: DISCONTINUED | OUTPATIENT
Start: 2024-12-06 | End: 2024-12-10 | Stop reason: HOSPADM

## 2024-12-06 RX ORDER — ROCURONIUM BROMIDE 10 MG/ML
INJECTION, SOLUTION INTRAVENOUS CODE/TRAUMA/SEDATION MEDICATION
Status: DISCONTINUED | OUTPATIENT
Start: 2024-12-06 | End: 2024-12-10 | Stop reason: HOSPADM

## 2024-12-06 RX ORDER — LEVETIRACETAM 500 MG/5ML
1500 INJECTION, SOLUTION, CONCENTRATE INTRAVENOUS
Status: COMPLETED | OUTPATIENT
Start: 2024-12-06 | End: 2024-12-06

## 2024-12-06 RX ORDER — DILTIAZEM HCL 1 MG/ML
5 INJECTION, SOLUTION INTRAVENOUS CONTINUOUS
Status: DISCONTINUED | OUTPATIENT
Start: 2024-12-06 | End: 2024-12-08

## 2024-12-06 RX ORDER — PROPOFOL 10 MG/ML
20 INJECTION, EMULSION INTRAVENOUS
Status: COMPLETED | OUTPATIENT
Start: 2024-12-06 | End: 2024-12-06

## 2024-12-06 RX ORDER — PROPOFOL 10 MG/ML
INJECTION, EMULSION INTRAVENOUS
Status: DISPENSED
Start: 2024-12-06 | End: 2024-12-07

## 2024-12-06 RX ORDER — ROCURONIUM BROMIDE 10 MG/ML
INJECTION, SOLUTION INTRAVENOUS
Status: DISPENSED
Start: 2024-12-06 | End: 2024-12-06

## 2024-12-06 RX ORDER — DIGOXIN 0.25 MG/ML
125 INJECTION INTRAMUSCULAR; INTRAVENOUS ONCE AS NEEDED
Status: COMPLETED | OUTPATIENT
Start: 2024-12-06 | End: 2024-12-07

## 2024-12-06 RX ORDER — ETOMIDATE 2 MG/ML
INJECTION INTRAVENOUS
Status: DISPENSED
Start: 2024-12-06 | End: 2024-12-06

## 2024-12-06 RX ORDER — PROPOFOL 10 MG/ML
20 INJECTION, EMULSION INTRAVENOUS CONTINUOUS
Status: DISCONTINUED | OUTPATIENT
Start: 2024-12-06 | End: 2024-12-07

## 2024-12-06 RX ORDER — GLUCAGON 1 MG
1 KIT INJECTION
Status: DISCONTINUED | OUTPATIENT
Start: 2024-12-06 | End: 2024-12-10 | Stop reason: HOSPADM

## 2024-12-06 RX ORDER — SODIUM CHLORIDE 0.9 % (FLUSH) 0.9 %
10 SYRINGE (ML) INJECTION
Status: DISCONTINUED | OUTPATIENT
Start: 2024-12-06 | End: 2024-12-10 | Stop reason: HOSPADM

## 2024-12-06 RX ORDER — INSULIN ASPART 100 [IU]/ML
0-5 INJECTION, SOLUTION INTRAVENOUS; SUBCUTANEOUS EVERY 6 HOURS PRN
Status: DISCONTINUED | OUTPATIENT
Start: 2024-12-06 | End: 2024-12-10 | Stop reason: HOSPADM

## 2024-12-06 RX ORDER — SODIUM CHLORIDE, SODIUM LACTATE, POTASSIUM CHLORIDE, CALCIUM CHLORIDE 600; 310; 30; 20 MG/100ML; MG/100ML; MG/100ML; MG/100ML
INJECTION, SOLUTION INTRAVENOUS CONTINUOUS
Status: DISCONTINUED | OUTPATIENT
Start: 2024-12-06 | End: 2024-12-08

## 2024-12-06 RX ORDER — CEFEPIME HYDROCHLORIDE 2 G/1
2 INJECTION, POWDER, FOR SOLUTION INTRAVENOUS
Status: DISCONTINUED | OUTPATIENT
Start: 2024-12-06 | End: 2024-12-10 | Stop reason: HOSPADM

## 2024-12-06 RX ORDER — ACETAMINOPHEN 650 MG/1
650 SUPPOSITORY RECTAL EVERY 6 HOURS PRN
Status: DISCONTINUED | OUTPATIENT
Start: 2024-12-06 | End: 2024-12-08

## 2024-12-06 RX ORDER — ONDANSETRON HYDROCHLORIDE 2 MG/ML
4 INJECTION, SOLUTION INTRAVENOUS EVERY 8 HOURS PRN
Status: DISCONTINUED | OUTPATIENT
Start: 2024-12-06 | End: 2024-12-10 | Stop reason: HOSPADM

## 2024-12-06 RX ADMIN — CEFEPIME 2 G: 2 INJECTION, POWDER, FOR SOLUTION INTRAVENOUS at 08:12

## 2024-12-06 RX ADMIN — LEVETIRACETAM 1500 MG: 100 INJECTION, SOLUTION INTRAVENOUS at 09:12

## 2024-12-06 RX ADMIN — Medication 2000 MG: at 02:12

## 2024-12-06 RX ADMIN — LEVETIRACETAM 500 MG: 100 INJECTION, SOLUTION INTRAVENOUS at 10:12

## 2024-12-06 RX ADMIN — DILTIAZEM HYDROCHLORIDE 25 MG: 5 INJECTION, SOLUTION INTRAVENOUS at 10:12

## 2024-12-06 RX ADMIN — PROPOFOL 50 MCG/KG/MIN: 10 INJECTION, EMULSION INTRAVENOUS at 05:12

## 2024-12-06 RX ADMIN — PROPOFOL 50 MCG/KG/MIN: 10 INJECTION, EMULSION INTRAVENOUS at 01:12

## 2024-12-06 RX ADMIN — PROPOFOL 20 MCG/KG/MIN: 10 INJECTION, EMULSION INTRAVENOUS at 10:12

## 2024-12-06 RX ADMIN — DILTIAZEM HYDROCHLORIDE 5 MG/HR: 5 INJECTION INTRAVENOUS at 08:12

## 2024-12-06 RX ADMIN — DILTIAZEM HYDROCHLORIDE 5 MG/HR: 5 INJECTION INTRAVENOUS at 10:12

## 2024-12-06 RX ADMIN — PROPOFOL 50 MCG/KG/MIN: 10 INJECTION, EMULSION INTRAVENOUS at 08:12

## 2024-12-06 RX ADMIN — SODIUM CHLORIDE 1000 ML: 9 INJECTION, SOLUTION INTRAVENOUS at 11:12

## 2024-12-06 RX ADMIN — INSULIN HUMAN 14 UNITS: 100 INJECTION, SOLUTION PARENTERAL at 10:12

## 2024-12-06 RX ADMIN — SODIUM CHLORIDE 1500 ML: 9 INJECTION, SOLUTION INTRAVENOUS at 12:12

## 2024-12-06 RX ADMIN — PROPOFOL 40 MCG/KG/MIN: 10 INJECTION, EMULSION INTRAVENOUS at 11:12

## 2024-12-06 RX ADMIN — DILTIAZEM HYDROCHLORIDE 5 MG/HR: 5 INJECTION INTRAVENOUS at 02:12

## 2024-12-06 RX ADMIN — PROPOFOL 50 MCG/KG/MIN: 10 INJECTION, EMULSION INTRAVENOUS at 03:12

## 2024-12-06 RX ADMIN — ROCURONIUM BROMIDE 100 MG: 10 INJECTION, SOLUTION INTRAVENOUS at 09:12

## 2024-12-06 RX ADMIN — ETOMIDATE 20 MG: 2 INJECTION, SOLUTION INTRAVENOUS at 09:12

## 2024-12-06 RX ADMIN — SODIUM CHLORIDE, POTASSIUM CHLORIDE, SODIUM LACTATE AND CALCIUM CHLORIDE: 600; 310; 30; 20 INJECTION, SOLUTION INTRAVENOUS at 12:12

## 2024-12-06 RX ADMIN — PIPERACILLIN AND TAZOBACTAM 4.5 G: 4; .5 INJECTION, POWDER, LYOPHILIZED, FOR SOLUTION INTRAVENOUS; PARENTERAL at 06:12

## 2024-12-06 RX ADMIN — INSULIN ASPART 3 UNITS: 100 INJECTION, SOLUTION INTRAVENOUS; SUBCUTANEOUS at 04:12

## 2024-12-06 RX ADMIN — MUPIROCIN: 20 OINTMENT TOPICAL at 08:12

## 2024-12-06 RX ADMIN — MUPIROCIN: 20 OINTMENT TOPICAL at 04:12

## 2024-12-06 RX ADMIN — AZITHROMYCIN MONOHYDRATE 500 MG: 500 INJECTION, POWDER, LYOPHILIZED, FOR SOLUTION INTRAVENOUS at 10:12

## 2024-12-06 RX ADMIN — INSULIN ASPART 5 UNITS: 100 INJECTION, SOLUTION INTRAVENOUS; SUBCUTANEOUS at 11:12

## 2024-12-06 RX ADMIN — CEFTRIAXONE SODIUM 1 G: 1 INJECTION, POWDER, FOR SOLUTION INTRAMUSCULAR; INTRAVENOUS at 10:12

## 2024-12-06 NOTE — LETTER
This communication is flagged as high priority.    Good morning,     This patient ws admitted 12/6/24. Discharged 12/10/24. DC Summary attached for review.     Thank you,     Stone Werner RN Case Manager  Ph. [phone]  . [phone]  Email. [email or web address]

## 2024-12-06 NOTE — ED PROVIDER NOTES
"12/06/2024         9:18 AM    Source of History:  History obtained from EMS.     Chief complaint:  From Nurse Triage:  Unresponsive (EMS reports, "Unresponsive with pinpoint pupils upon arrival. Fire depart gave 2 mg of Narcan IN, no improvement. Last seen normal last night at 2300. 22 gauge left thumb. Narcan 2 mg given IV, started responding to voice. Uncooperative and flailing. History of sepsis. Had seizure en route, became apneic, started bagging. ." )    HISTORY OF PRESENT ILLNES:  Chapo Rosario is a 64 y.o. male  has a past medical history of LEEANN (acute kidney injury) (11/13/2023), Anticoagulant long-term use, Aortic aneurysm, Atrial fibrillation, COVID-19 vaccine administered, Group D streptococcal infection, Hyperlipidemia, Hypertension, Pneumonia, unspecified organism, Severe sepsis (08/30/2018), Sleep apnea, Thrombocytopenia (11/13/2023), and VHD (valvular heart disease). presenting with Unresponsive (EMS reports, "Unresponsive with pinpoint pupils upon arrival. Fire depart gave 2 mg of Narcan IN, no improvement. Last seen normal last night at 2300. 22 gauge left thumb. Narcan 2 mg given IV, started responding to voice. Uncooperative and flailing. History of sepsis. Had seizure en route, became apneic, started bagging. ." )  Patient was found by family unresponsive last known well was last night at 11:00 p.m. patient had no complaints prior to going to sleep.    REVIEW OF SYSTEMS:   Constitutional symptoms:     Skin symptoms:      Eye symptoms:     ENMT symptoms:      Respiratory symptoms:      Cardiovascular symptoms:     Gastrointestinal symptoms:      Genitourinary symptoms:     Musculoskeletal symptoms:      Neurologic symptoms:      Psychiatric symptoms:               Additional review of systems information: Patient Denies Any Other Complaints.    All Other Systems Reviewed With Patient And Negative.    ALLEGIES:  Review of patient's allergies indicates:  No Known " Allergies    MEDICINE LIST:  Current Outpatient Medications   Medication Instructions    amiodarone (PACERONE) 100 mg, Oral, Daily    aspirin (ECOTRIN) 81 mg, Daily    cholecalciferol (vitamin D3) (VITAMIN D3) 5,000 Units, Daily    famotidine (PEPCID) 20 mg, Oral, 2 times daily    fenofibrate micronized (LOFIBRA) 134 mg, With breakfast    furosemide (LASIX) 40 MG tablet If increased SOB or weight gain greater or equal to 3lbs in 24 hours increase lasix to 40mg daily x 3 days then resume 20mg daily    magnesium oxide (MAG-OX) 400 mg, Oral, 2 times daily    metoprolol succinate (TOPROL-XL) 50 mg, Oral, 2 times daily    pantoprazole (PROTONIX) 40 MG tablet Take 1 tablet twice daily 30 min before meals for 2 weeks then Once daily before breakfast for 4 weeks    potassium chloride SA (K-DUR,KLOR-CON) 20 MEQ tablet 40 mEq, Oral, Daily    senna-docusate 8.6-50 mg (PERICOLACE) 8.6-50 mg per tablet 2 tablets, Oral, 2 times daily PRN    spironolactone (ALDACTONE) 25 mg, Oral, 2 times daily    tadalafiL (CIALIS) 5 mg, Oral, Daily PRN    warfarin (COUMADIN) 4 mg, Oral, Daily, Take 4 mg orally once daily except 6 mg orally on Mondays only or as directed by [facility].        PMH:  As per HPI and below:    Reviewed and updated in chart.    PAST MEDICAL HISTORY:  Past Medical History:   Diagnosis Date    LEEANN (acute kidney injury) 11/13/2023    Anticoagulant long-term use     Aortic aneurysm     Atrial fibrillation     COVID-19 vaccine administered     Group D streptococcal infection     Hyperlipidemia     Hypertension     Pneumonia, unspecified organism     Severe sepsis 08/30/2018    Sleep apnea     Thrombocytopenia 11/13/2023    VHD (valvular heart disease)         PAST SURGICAL HISTORY:  Past Surgical History:   Procedure Laterality Date    ABLATION N/A 4/1/2024    Procedure: Afib Ablation;  Surgeon: Hung Stacy MD;  Location: [facility];  Service: Cardiology;  Laterality: N/A;  ops # 5    aortic valve replacement       APPENDECTOMY      CARDIAC SURGERY  2013    MECHANICAL AVR    CHOLECYSTECTOMY      COLONOSCOPY N/A 2/14/2024    Procedure: COLONOSCOPY;  Surgeon: Ta Flowers MD;  Location: [facility];  Service: Endoscopy;  Laterality: N/A;    CORONARY ANGIOGRAPHY      ECHOCARDIOGRAM, TRANSESOPHAGEAL      ESOPHAGOGASTRODUODENOSCOPY N/A 2/14/2024    Procedure: EGD (ESOPHAGOGASTRODUODENOSCOPY);  Surgeon: Ta Flowers MD;  Location: [facility] ENDO;  Service: Endoscopy;  Laterality: N/A;    NECK SURGERY      TRANSESOPHAGEAL ECHOCARDIOGRAPHY N/A 11/17/2023    Procedure: ECHOCARDIOGRAM, TRANSESOPHAGEAL;  Surgeon: Antonio Ortiz MD;  Location: [facility] ENDO;  Service: Cardiovascular;  Laterality: N/A;    TREATMENT OF CARDIAC ARRHYTHMIA N/A 11/17/2023    Procedure: Cardioversion or Defibrillation;  Surgeon: Antonio Ortiz MD;  Location: [facility] ENDO;  Service: Cardiovascular;  Laterality: N/A;       SOCIAL HISTORY:  Social History     Tobacco Use    Smoking status: Former     Passive exposure: Never   Substance Use Topics    Alcohol use: Never    Drug use: Never       FAMILY HISTORY:  Family History   Problem Relation Name Age of Onset    No Known Problems Mother      Heart disease Father      Cancer Brother          colon        PROBLEM LIST:  Patient Active Problem List   Diagnosis    Bacteremia due to Streptococcus    Mechanical heart valve present    Knowledge deficit about therapeutic diet    Streptococcal bacteremia Group G    History of atrial fibrillation    Class 2 severe obesity with serious comorbidity and body mass index (BMI) of 35.0 to 35.9 in adult    Primary hypertension    Hyperlipidemia    Abnormal EKG    Hypokalemia    Hyperbilirubinemia    Persistent atrial fibrillation        PHYSICAL EXAM:      ED Triage Vitals [12/06/24 0901]   BP (!) 161/65   Pulse (!) 117   Resp (!) 26   Temp    SpO2 96 %        Vital Signs: Reviewed As In Chart.  General:  Unresponsive No Cardiorespiratory Distress Noted.  Head:  Normocephalic   Eye:  Pupils 3 mm bilaterally equal and sluggish to respond ENT: Mucus membranes are moist.   Neck: Supple  Cardiovascular:  Regular Rate And Rhythm     Respiratory:  Sonorous respiration   Gastrointestinal:  Soft, Non Distended,  Normal Bowel Sounds.  Morbidly obese  Neurological:  Unresponsive, no eye opening, nonverbal, appears to have some  Back: Normal range of motion  Musculoskeletal:  No Gross Deformity Noted.   Genital: deferred    Psychiatric:  Cooperative.  Skin: warm, dry  Lymphatic: No lymphadenopathy      ED WORKUP FOR MEDICAL DECISION MAKING:    ED ORDERS:  Orders Placed This Encounter   Procedures    INTUBATION    Culture, Respiratory with Gram Stain    Blood culture #1 **CANNOT BE ORDERED STAT**    Blood culture #2 **CANNOT BE ORDERED STAT**    CT Head Without Contrast    X-Ray Chest 1 View    X-Ray Chest 1 View    CBC auto differential    Comprehensive metabolic panel    TROPONIN I HIGH SENSITIVITY    APTT    Protime-INR    Acetone    Lactic acid, plasma    Urinalysis, Reflex to Urine Culture Urine, Clean Catch    Urinalysis Microscopic    Diet NPO    Nasogastric/Orogastric tube insertion    Nasogastric/Orogastric tube maintenance    [place] to [place]    Cardiac Monitoring - Adult    Notify Physician - Potential Need of Opioid Reversal    Place sequential compression device    Full code    Cardiology    Pulse Oximetry Continuous    Weaning protocol PRN    SUCTION PRN    POCT ARTERIAL BLOOD GAS Blood Gas    POCT Arterial Blood Gas-Resp    Mechanical ventilation Continuous    POCT COVID-19 Rapid Screening    POCT Influenza A/B Molecular    EKG 12-lead    Insert Saline lock IV    Admit to Inpatient       ED MEDICINES:  Medications   etomidate injection ( Intravenous Canceled Entry 12/6/24 0915)   rocuronium injection ( Intravenous Canceled Entry 12/6/24 0915)   cefTRIAXone injection 1 g (1 g Intravenous Given 12/6/24 1039)   azithromycin (ZITHROMAX) 500 mg in 0.9% NaCl 250 mL IVPB  (admixture device) (500 mg Intravenous New Bag 12/6/24 1051)   sodium chloride 0.9% bolus 1,000 mL 1,000 mL (1,000 mLs Intravenous New Bag 12/6/24 1133)   sodium chloride 0.9% bolus 1,500 mL 1,500 mL (has no administration in time range)   sodium chloride 0.9% flush 10 mL (has no administration in time range)   lactated ringers infusion (has no administration in time range)   ondansetron injection 4 mg (has no administration in time range)   mupirocin 2 % ointment (has no administration in time range)   levETIRAcetam injection 1,500 mg (1,500 mg Intravenous Given 12/6/24 0927)   insulin regular injection 14 Units 0.14 mL (14 Units Intravenous Given 12/6/24 1020)   diltiaZEM injection 25 mg (25 mg Intravenous Given 12/6/24 1035)   diltiaZEM 125 mg in D5W 125 mL infusion (7.5 mg/hr Intravenous Rate/Dose Change 12/6/24 1109)   propofol (DIPRIVAN) 10 mg/mL infusion (50 mcg/kg/min × 133.8 kg Intravenous Rate/Dose Change 12/6/24 1110)                ED LABS ORDERED AND REVIEWED:  Admission on 12/06/2024   Component Date Value Ref Range Status    WBC 12/06/2024 20.01 (H)  3.90 - 12.70 K/uL Final    RBC 12/06/2024 4.53 (L)  4.60 - 6.20 M/uL Final    Hemoglobin 12/06/2024 16.3  14.0 - 18.0 g/dL Final    Hematocrit 12/06/2024 46.9  40.0 - 54.0 % Final    MCV 12/06/2024 104 (H)  82 - 98 fL Final    MCH 12/06/2024 36.0 (H)  27.0 - 31.0 pg Final    MCHC 12/06/2024 34.8  32.0 - 36.0 g/dL Final    RDW 12/06/2024 13.8  11.5 - 14.5 % Final    Platelets 12/06/2024 193  150 - 450 K/uL Final    MPV 12/06/2024 9.6  9.2 - 12.9 fL Final    Immature Granulocytes 12/06/2024 1.4 (H)  0.0 - 0.5 % Final    Gran # (ANC) 12/06/2024 12.9 (H)  1.8 - 7.7 K/uL Final    Immature Grans (Abs) 12/06/2024 0.28 (H)  0.00 - 0.04 K/uL Final    Lymph # 12/06/2024 4.7  1.0 - 4.8 K/uL Final    Mono # 12/06/2024 1.9 (H)  0.3 - 1.0 K/uL Final    Eos # 12/06/2024 0.0  0.0 - 0.5 K/uL Final    Baso # 12/06/2024 0.11  0.00 - 0.20 K/uL Final    nRBC 12/06/2024 0  0  /100 WBC Final    Gran % 12/06/2024 64.8  38.0 - 73.0 % Final    Lymph % 12/06/2024 23.5  18.0 - 48.0 % Final    Mono % 12/06/2024 9.7  4.0 - 15.0 % Final    Eosinophil % 12/06/2024 0.1  0.0 - 8.0 % Final    Basophil % 12/06/2024 0.5  0.0 - 1.9 % Final    Differential Method 12/06/2024 Automated   Final    Sodium 12/06/2024 139  136 - 145 mmol/L Final    Potassium 12/06/2024 4.3  3.5 - 5.1 mmol/L Final    Chloride 12/06/2024 102  95 - 110 mmol/L Final    CO2 12/06/2024 12 (L)  23 - 29 mmol/L Final    Glucose 12/06/2024 474 (HH)  70 - 110 mg/dL Final    BUN 12/06/2024 18  8 - 23 mg/dL Final    Creatinine 12/06/2024 2.0 (H)  0.5 - 1.4 mg/dL Final    Calcium 12/06/2024 9.4  8.7 - 10.5 mg/dL Final    Total Protein 12/06/2024 8.5 (H)  6.0 - 8.4 g/dL Final    Albumin 12/06/2024 3.8  3.5 - 5.2 g/dL Final    Total Bilirubin 12/06/2024 1.4 (H)  0.1 - 1.0 mg/dL Final    Alkaline Phosphatase 12/06/2024 103  55 - 135 U/L Final    AST 12/06/2024 31  10 - 40 U/L Final    ALT 12/06/2024 37  10 - 44 U/L Final    eGFR 12/06/2024 37.0 (A)  >60 mL/min/1.73 m^2 Final    Anion Gap 12/06/2024 25 (H)  3 - 11 mmol/L Final    Troponin I High Sensitivity 12/06/2024 15.6  0.0 - 60.0 pg/mL Final    POC PH 12/06/2024 7.202 (LL)  7.345 - 7.450 Final    POC PCO2 12/06/2024 40.1  35.0 - 45.0 mmHg Final    POC PO2 12/06/2024 128 (H)  80.0 - 100 mmHg Final    POC SATURATED O2 12/06/2024 99.4  95.0 - 100.0 % Final    POC HCO3 12/06/2024 15.7 (L)  24.0 - 28.0 mmol/l Final    Base Deficit 12/06/2024 -12.3 (L)  -2.0 - 2.0 mmol/l Final    POC Temp 12/06/2024 37.0  C Final    Specimen source 12/06/2024 Arterial   Final    Performed By: 12/06/2024 Stadalis   Final    MODIFIED HARRIETT'S TEST 12/06/2024 NA   Final    FiO2 12/06/2024 100.0  % Final    O2DEVICE 12/06/2024 Ventilator   Final    AC 12/06/2024 14   Final    Vt 12/06/2024 600   Final    PEEP 12/06/2024 5.0   Final    PEAK FLOW 12/06/2024 60.0   Final    Provider Notified: 12/06/2024 ADAM OBRIEN    Final    Notified Time 12/06/2024 12/06/2024 09:54   Final    Notified By 12/06/2024 Asaf   Final    Notified Note 12/06/2024 Called and read back by ADAM OBRIEN   Final    Acetone, Bld 12/06/2024 Negative  Negative Final    Lactate (Lactic Acid) 12/06/2024 9.2 (HH)  0.5 - 2.2 mmol/L Final    Specimen UA 12/06/2024 Urine, Catheterized   Final    Color, UA 12/06/2024 Yellow  Yellow, Straw, Oliva Final    Appearance, UA 12/06/2024 Clear  Clear Final    pH, UA 12/06/2024 5.0  5.0 - 8.0 Final    Specific Gravity, UA 12/06/2024 >=1.030 (A)  1.005 - 1.030 Final    Protein, UA 12/06/2024 Trace (A)  Negative Final    Glucose, UA 12/06/2024 4+ (A)  Negative Final    Ketones, UA 12/06/2024 Negative  Negative Final    Bilirubin (UA) 12/06/2024 Negative  Negative Final    Occult Blood UA 12/06/2024 Trace (A)  Negative Final    Nitrite, UA 12/06/2024 Negative  Negative Final    Urobilinogen, UA 12/06/2024 Negative  <2.0 EU/dL Final    Leukocytes, UA 12/06/2024 Negative  Negative Final    RBC, UA 12/06/2024 2  0 - 4 /hpf Final    WBC, UA 12/06/2024 1  0 - 5 /hpf Final    Bacteria 12/06/2024 Negative  None-Occ /hpf Final    Yeast, UA 12/06/2024 None  None Final    Squam Epithel, UA 12/06/2024 1  /hpf Final    Hyaline Casts, UA 12/06/2024 0.7 (A)  0-1/lpf /lpf Final    Microscopic Comment 12/06/2024 SEE COMMENT   Final       RADIOLOGY STUDIES ORDERED AND REVIEWED:  Imaging Results              X-Ray Chest 1 View (Final result)  Result time 12/06/24 11:12:19      Final result by Nury Vargas MD (12/06/24 11:12:19)                   Impression:      ET tube and NG tube in place with sternotomy wires with left basilar infiltrate.      Electronically signed by: Nury Vargas MD  Date:    12/06/2024  Time:    11:12               Narrative:    EXAMINATION:  XR CHEST 1 VIEW    CLINICAL HISTORY:  Presence of other specified devices    TECHNIQUE:  portable chest x-ray    COMPARISON:  12/06/2024 at 09:36.   <Comparisons>    FINDINGS:  There is ET tube and NG tube in place.  There is left basilar infiltrate.                                       X-Ray Chest 1 View (Final result)  Result time 12/06/24 11:20:08      Final result by Nury Vargas MD (12/06/24 11:20:08)                   Impression:      ET tube and NG tube in place with left basilar infiltrate      Electronically signed by: Nury Vargas MD  Date:    12/06/2024  Time:    11:20               Narrative:    EXAMINATION:  XR CHEST 1 VIEW    CLINICAL HISTORY:  Other symptoms and signs involving cognitive functions and awareness    TECHNIQUE:  portable chest x-ray    COMPARISON:  11/17/2023    FINDINGS:  ET tube and NG tube are in place.  There is left basilar infiltrate.                                       CT Head Without Contrast (Final result)  Result time 12/06/24 10:01:08      Final result by Antonio Joiner MD (12/06/24 10:01:08)                   Impression:      No evidence of an acute intracranial abnormality.    Suspected small remote infarcts as above.      Electronically signed by: Antonio Joiner MD  Date:    12/06/2024  Time:    10:01               Narrative:    EXAMINATION:  CT HEAD WITHOUT CONTRAST    CLINICAL HISTORY:  Mental status change, unknown cause;    CT/nuclear cardiac exams in previous 12 months: None    TECHNIQUE:  Axial CT images were obtained. Iterative reconstruction technique was used.    COMPARISON:  None    FINDINGS:  No intracranial hemorrhage, mass, mass effect or recent infarct evident.  Old lacunar infarct suspected of the left basal ganglia.  Small remote infarcts also questioned of the left cerebellar hemisphere and right frontal lobe.  Ventricles are not enlarged.  Visualized paranasal sinuses and mastoid air cells are clear.  Calvarium is intact.                                      MEDICAL DECISION MAKING:    Chapo Rosario is 64 y.o. male who  has a past medical history of LEEANN (acute kidney injury) (11/13/2023),  "Anticoagulant long-term use, Aortic aneurysm, Atrial fibrillation, COVID-19 vaccine administered, Group D streptococcal infection, Hyperlipidemia, Hypertension, Pneumonia, unspecified organism, Severe sepsis (08/30/2018), Sleep apnea, Thrombocytopenia (11/13/2023), and VHD (valvular heart disease). arrives in ER with c/o Unresponsive (EMS reports, "Unresponsive with pinpoint pupils upon arrival. Fire depart gave 2 mg of Narcan IN, no improvement. Last seen normal last night at 2300. 22 gauge left thumb. Narcan 2 mg given IV, started responding to voice. Uncooperative and flailing. History of sepsis. Had seizure en route, became apneic, started bagging. ." )      Reviewed Nurses Note. Reviewed Vital Signs.     Reviewed Pertinent old records, History and updated as necessary.    Vitals:    12/06/24 1048   BP: (!) 107/58   Pulse: (!) 129   Resp: (!) 27   Temp:         Medical Decision Making  Differential diagnosis includes but is not limited to confusion, dementia, CVA, TIA, hypoglycemia, UTI, pneumonia, alcohol intoxication, drug abuse, delirium, dehydration, electrolyte imbalance, depression, anxiety and intracranial hemorrhage.    Amount and/or Complexity of Data Reviewed  Labs: ordered.     Details: Elevated WBC, elevated glucose, elevated lactic acid, low pH, increased anion gap  Radiology: ordered and independent interpretation performed.     Details: ET tube in place, left lower lobe infiltrate  ECG/medicine tests: ordered and independent interpretation performed.     Details: Atrial fibrillation rate of 147, incomplete right bundle-branch block nonspecific ST change  Discussion of management or test interpretation with external provider(s): Discussed with Dr. Romero who recommends vancomycin and Zosyn.  I questioned the family members more about steroids.  Patient was not on steroids anytime recently.    Risk  OTC drugs.  Prescription drug management.                     Patient is overweight.  Fluid " calculation for sepsis is based on his ideal body weight of 82 kg.      PROCEDURES PERFORMED IN ED:  Intubation    Date/Time: 12/6/2024 9:22 AM  Location procedure was performed: [facility] EMERGENCY DEPARTMENT    Performed by: iWng Rowland MD  Authorized by: Wing Rowland MD  Consent Done: Emergent Situation  Indications: airway protection  Intubation method: video-assisted  Patient status: paralyzed (RSI)  Preoxygenation: BVM  Sedatives: etomidate  Paralytic: rocuronium  Laryngoscope size: Glide 4  Tube size: 7.5 mm  Tube type: cuffed  Number of attempts: 1  Cricoid pressure: no  Cords visualized: yes  Post-procedure assessment: chest rise and CO2 detector  Breath sounds: clear  Cuff inflated: yes  ETT to teeth: 23 cm  Tube secured with: ETT de la cruz  Chest x-ray interpreted by me.  Chest x-ray findings: endotracheal tube in appropriate position  Patient tolerance: Patient tolerated the procedure well with no immediate complications  Complications: No  Specimens: No  Implants: No          DIAGNOSTIC IMPRESSION:        ICD-10-CM ICD-9-CM   1. Altered mental status, unspecified altered mental status type  R41.82 780.97   2. Unresponsive  R41.89 780.09   3. Endotracheal tube present  Z97.8 V49.89   4. New onset seizure  R56.9 780.39   5. Hyperglycemia  R73.9 790.29   6. Sepsis, due to unspecified organism, unspecified whether acute organ dysfunction present  A41.9 038.9     995.91   7. Atrial fibrillation with RVR  I48.91 427.31         ED Disposition Condition    Admit Serious               Medication List        ASK your doctor about these medications      amiodarone 200 MG Tab  Commonly known as: PACERONE  Take 0.5 tablets (100 mg total) by mouth once daily.     aspirin 81 MG EC tablet  Commonly known as: ECOTRIN     famotidine 20 MG tablet  Commonly known as: PEPCID  Take 1 tablet (20 mg total) by mouth 2 (two) times daily.     fenofibrate micronized 134 MG Cap  Commonly known as: LOFIBRA     furosemide 40 MG  tablet  Commonly known as: LASIX  If increased SOB or weight gain greater or equal to 3lbs in 24 hours increase lasix to 40mg daily x 3 days then resume 20mg daily     magnesium oxide 400 mg (241.3 mg magnesium) tablet  Commonly known as: MAG-OX  Take 1 tablet (400 mg total) by mouth 2 (two) times daily.     metoprolol succinate 25 MG 24 hr tablet  Commonly known as: TOPROL-XL  Take 2 tablets (50 mg total) by mouth 2 (two) times daily.     pantoprazole 40 MG tablet  Commonly known as: PROTONIX  Take 1 tablet twice daily 30 min before meals for 2 weeks then Once daily before breakfast for 4 weeks     potassium chloride SA 20 MEQ tablet  Commonly known as: K-DUR,KLOR-CON  Take 2 tablets (40 mEq total) by mouth once daily.     senna-docusate 8.6-50 mg 8.6-50 mg per tablet  Commonly known as: PERICOLACE  Take 2 tablets by mouth 2 (two) times daily as needed for Constipation.     spironolactone 25 MG tablet  Commonly known as: ALDACTONE  Take 1 tablet (25 mg total) by mouth 2 (two) times daily.     tadalafiL 5 MG tablet  Commonly known as: CIALIS     VITAMIN D3 125 mcg (5,000 unit) Tab  Generic drug: cholecalciferol (vitamin D3)     warfarin 4 MG tablet  Commonly known as: COUMADIN  Take 1 tablet (4 mg total) by mouth Daily. Take 4 mg orally once daily except 6 mg orally on Mondays only or as directed by [facility].                           Wing Rowland MD  12/06/24 [id number]

## 2024-12-06 NOTE — TELEMEDICINE CONSULT
"[facility]    Cardiology  Consult Note    Patient Name: Chapo Rosario  MRN: [id number]  Admission Date: 12/6/2024  Hospital Length of Stay: 0 days  Code Status: Full Code   Attending Provider: Parveen Salazar Jr., MD   Consulting Provider: Parveen Chance MD  Primary Care Physician: Flynn Delarosa MD  Principal Problem:<principal problem not specified>    Patient information was obtained from chart.     Subjective:     Chief Complaint/Reason for Consult: A-Fib rate 116 BPM    HPI: Chapo Rosario is a 64 y.o. male  has a past medical history of LEEANN (acute kidney injury) (11/13/2023), Anticoagulant long-term use, Aortic aneurysm, Atrial fibrillation, COVID-19 vaccine administered, Group D streptococcal infection, Hyperlipidemia, Hypertension, Pneumonia, unspecified organism, Severe sepsis (08/30/2018), Sleep apnea, Thrombocytopenia (11/13/2023), and VHD (valvular heart disease). presenting with Unresponsive (EMS reports, "Unresponsive with pinpoint pupils upon arrival. Fire depart gave 2 mg of Narcan IN, no improvement. Last seen normal last night at 2300. 22 gauge left thumb. Narcan 2 mg given IV, started responding to voice. Uncooperative and flailing. History of sepsis. Had seizure en route, became apneic, started bagging. ." )  Patient was found by family unresponsive last known well was last night at 11:00 p.m. patient had no complaints prior to going to sleep.  Patient is currently intubated in atrial fibrillation at 116 beats per minute echocardiogram showed normal left ventricular systolic function with normal functioning aortic valve.  Patient is on chronic anticoagulation with warfarin.  Patient has a history of AFib ablation.          Previous Cardiac Diagnostics:     A-fib ablation  AVR         Past Medical History:   Diagnosis Date    LEEANN (acute kidney injury) 11/13/2023    Anticoagulant long-term use     Aortic aneurysm     Atrial fibrillation     COVID-19 vaccine administered     " Group D streptococcal infection     Hyperlipidemia     Hypertension     Pneumonia, unspecified organism     Severe sepsis 08/30/2018    Sleep apnea     Thrombocytopenia 11/13/2023    VHD (valvular heart disease)      Past Surgical History:   Procedure Laterality Date    ABLATION N/A 4/1/2024    Procedure: Afib Ablation;  Surgeon: Hung Stacy MD;  Location: [facility] CATH;  Service: Cardiology;  Laterality: N/A;  ops # 5    aortic valve replacement      APPENDECTOMY      CARDIAC SURGERY  2013    MECHANICAL AVR    CHOLECYSTECTOMY      COLONOSCOPY N/A 2/14/2024    Procedure: COLONOSCOPY;  Surgeon: Ta Flowers MD;  Location: [facility] ENDO;  Service: Endoscopy;  Laterality: N/A;    CORONARY ANGIOGRAPHY      ECHOCARDIOGRAM, TRANSESOPHAGEAL      ESOPHAGOGASTRODUODENOSCOPY N/A 2/14/2024    Procedure: EGD (ESOPHAGOGASTRODUODENOSCOPY);  Surgeon: Ta Flowers MD;  Location: [facility] ENDO;  Service: Endoscopy;  Laterality: N/A;    NECK SURGERY      TRANSESOPHAGEAL ECHOCARDIOGRAPHY N/A 11/17/2023    Procedure: ECHOCARDIOGRAM, TRANSESOPHAGEAL;  Surgeon: Antonio Ortiz MD;  Location: [facility] ENDO;  Service: Cardiovascular;  Laterality: N/A;    TREATMENT OF CARDIAC ARRHYTHMIA N/A 11/17/2023    Procedure: Cardioversion or Defibrillation;  Surgeon: Antonio Ortiz MD;  Location: [facility] ENDO;  Service: Cardiovascular;  Laterality: N/A;     Review of patient's allergies indicates:  No Known Allergies  Current Facility-Administered Medications on File Prior to Encounter   Medication    0.9%  NaCl infusion     Current Outpatient Medications on File Prior to Encounter   Medication Sig    amiodarone (PACERONE) 200 MG Tab Take 0.5 tablets (100 mg total) by mouth once daily. (Patient taking differently: Take 100 mg by mouth every morning.)    aspirin (ECOTRIN) 81 MG EC tablet Take 81 mg by mouth once daily.    cholecalciferol, vitamin D3, (VITAMIN D3) 125 mcg (5,000 unit) Tab Take 5,000 Units by mouth once daily.    fenofibrate  micronized (LOFIBRA) 134 MG Cap Take 134 mg by mouth daily with breakfast.    furosemide (LASIX) 40 MG tablet If increased SOB or weight gain greater or equal to 3lbs in 24 hours increase lasix to 40mg daily x 3 days then resume 20mg daily (Patient taking differently: Take 20 mg by mouth once daily.)    magnesium oxide (MAG-OX) 400 mg (241.3 mg magnesium) tablet Take 1 tablet (400 mg total) by mouth 2 (two) times daily.    metoprolol succinate (TOPROL-XL) 25 MG 24 hr tablet Take 2 tablets (50 mg total) by mouth 2 (two) times daily.    potassium chloride SA (K-DUR,KLOR-CON) 20 MEQ tablet Take 2 tablets (40 mEq total) by mouth once daily. (Patient taking differently: Take 20 mEq by mouth 2 (two) times daily.)    spironolactone (ALDACTONE) 25 MG tablet Take 1 tablet (25 mg total) by mouth 2 (two) times daily.    warfarin (COUMADIN) 4 MG tablet Take 1 tablet (4 mg total) by mouth Daily. Take 4 mg orally once daily except 6 mg orally on Mondays only or as directed by CIS. (Patient taking differently: Take 4 mg by mouth Daily. Take 6 mg Daily except take 4 mg Saturday and Sunday)    famotidine (PEPCID) 20 MG tablet Take 1 tablet (20 mg total) by mouth 2 (two) times daily.    pantoprazole (PROTONIX) 40 MG tablet Take 1 tablet twice daily 30 min before meals for 2 weeks then Once daily before breakfast for 4 weeks    senna-docusate 8.6-50 mg (PERICOLACE) 8.6-50 mg per tablet Take 2 tablets by mouth 2 (two) times daily as needed for Constipation.    tadalafiL (CIALIS) 5 MG tablet Take 5 mg by mouth daily as needed for Erectile Dysfunction.     Family History       Problem Relation (Age of Onset)    Cancer Brother    Heart disease Father    No Known Problems Mother          Tobacco Use    Smoking status: Former     Passive exposure: Never    Smokeless tobacco: Not on file   Substance and Sexual Activity    Alcohol use: Never    Drug use: Never    Sexual activity: Yes       Review of Systems   Unable to perform ROS: Mental  "status change       Objective:     Vital Signs (Most Recent):  Temp: 99.7 °F (37.6 °C) (12/06/24 1002)  Pulse: 100 (12/06/24 1400)  Resp: 20 (12/06/24 1400)  BP: (!) 105/51 (12/06/24 1400)  SpO2: 100 % (12/06/24 1400) Vital Signs (24h Range):  Temp:  [99.7 °F (37.6 °C)] 99.7 °F (37.6 °C)  Pulse:  [] 100  Resp:  [20-33] 20  SpO2:  [95 %-100 %] 100 %  BP: ()/(51-84) 105/51   Weight: 133.8 kg (295 lb)  Body mass index is 37.88 kg/m².  SpO2: 100 %       Intake/Output Summary (Last 24 hours) at 12/6/2024 1512  Last data filed at 12/6/2024 1320  Gross per 24 hour   Intake 1349.18 ml   Output 500 ml   Net 849.18 ml     Lines/Drains/Airways       Drain  Duration                  NG/OG Tube 12/06/24 0940 orogastric 16 Fr. Left mouth <1 day         Urethral Catheter 12/06/24 1009 14 Fr. <1 day              Airway  Duration                  Airway - Non-Surgical 12/06/24 0912 <1 day              Peripheral Intravenous Line  Duration                  Peripheral IV - Single Lumen 12/06/24 0910 20 G Anterior;Right Wrist <1 day         Peripheral IV - Single Lumen 12/06/24 1030 20 G Anterior;Left Forearm <1 day         Peripheral IV - Single Lumen 12/06/24 22 G Left Hand <1 day                  Significant Labs:   Chemistries:   Recent Labs   Lab 12/06/24  0900      K 4.3      CO2 12*   BUN 18   CREATININE 2.0*   CALCIUM 9.4   PROT 8.5*   BILITOT 1.4*   ALKPHOS 103   ALT 37   AST 31        CBC/Anemia Labs: Coags:    Recent Labs   Lab 12/06/24  0900   WBC 20.01*   HGB 16.3   HCT 46.9      *   RDW 13.8    No results for input(s): "PT", "INR", "APTT" in the last 168 hours.     Significant Imaging:  Imaging Results              X-Ray Chest 1 View (Final result)  Result time 12/06/24 11:12:19      Final result by Nury Vargas MD (12/06/24 11:12:19)                   Impression:      ET tube and NG tube in place with sternotomy wires with left basilar infiltrate.      Electronically signed " by: Nury Vargas MD  Date:    12/06/2024  Time:    11:12               Narrative:    EXAMINATION:  XR CHEST 1 VIEW    CLINICAL HISTORY:  Presence of other specified devices    TECHNIQUE:  portable chest x-ray    COMPARISON:  12/06/2024 at 09:36.  <Comparisons>    FINDINGS:  There is ET tube and NG tube in place.  There is left basilar infiltrate.                                       X-Ray Chest 1 View (Final result)  Result time 12/06/24 11:20:08      Final result by Nury Vargas MD (12/06/24 11:20:08)                   Impression:      ET tube and NG tube in place with left basilar infiltrate      Electronically signed by: Nury Vargas MD  Date:    12/06/2024  Time:    11:20               Narrative:    EXAMINATION:  XR CHEST 1 VIEW    CLINICAL HISTORY:  Other symptoms and signs involving cognitive functions and awareness    TECHNIQUE:  portable chest x-ray    COMPARISON:  11/17/2023    FINDINGS:  ET tube and NG tube are in place.  There is left basilar infiltrate.                                       CT Head Without Contrast (Final result)  Result time 12/06/24 10:01:08      Final result by Antonio Joiner MD (12/06/24 10:01:08)                   Impression:      No evidence of an acute intracranial abnormality.    Suspected small remote infarcts as above.      Electronically signed by: Antonio Joiner MD  Date:    12/06/2024  Time:    10:01               Narrative:    EXAMINATION:  CT HEAD WITHOUT CONTRAST    CLINICAL HISTORY:  Mental status change, unknown cause;    CT/nuclear cardiac exams in previous 12 months: None    TECHNIQUE:  Axial CT images were obtained. Iterative reconstruction technique was used.    COMPARISON:  None    FINDINGS:  No intracranial hemorrhage, mass, mass effect or recent infarct evident.  Old lacunar infarct suspected of the left basal ganglia.  Small remote infarcts also questioned of the left cerebellar hemisphere and right frontal lobe.  Ventricles are not enlarged.  Visualized  paranasal sinuses and mastoid air cells are clear.  Calvarium is intact.                                    EKG: a-fib RVR    Telemetry:  a-fib 116 BMP  Intubated  Physical Exam  Constitutional:       Comments: sedated   Cardiovascular:      Rate and Rhythm: Tachycardia present. Rhythm irregular.      Heart sounds: No murmur heard.  Pulmonary:      Breath sounds: No rales.   Abdominal:      General: Abdomen is flat.   Musculoskeletal:         General: No swelling.   Neurological:      Comments: Sedated pin point pulls              Home Medications:   Current Facility-Administered Medications on File Prior to Encounter   Medication Dose Route Frequency Provider Last Rate Last Admin    0.9%  NaCl infusion   Intravenous Continuous Ta Flowers MD   Stopped at 02/14/24 1013     Current Outpatient Medications on File Prior to Encounter   Medication Sig Dispense Refill    amiodarone (PACERONE) 200 MG Tab Take 0.5 tablets (100 mg total) by mouth once daily. (Patient taking differently: Take 100 mg by mouth every morning.) 30 tablet 0    aspirin (ECOTRIN) 81 MG EC tablet Take 81 mg by mouth once daily.      cholecalciferol, vitamin D3, (VITAMIN D3) 125 mcg (5,000 unit) Tab Take 5,000 Units by mouth once daily.      fenofibrate micronized (LOFIBRA) 134 MG Cap Take 134 mg by mouth daily with breakfast.      furosemide (LASIX) 40 MG tablet If increased SOB or weight gain greater or equal to 3lbs in 24 hours increase lasix to 40mg daily x 3 days then resume 20mg daily (Patient taking differently: Take 20 mg by mouth once daily.) 30 tablet 0    magnesium oxide (MAG-OX) 400 mg (241.3 mg magnesium) tablet Take 1 tablet (400 mg total) by mouth 2 (two) times daily. 60 tablet 0    metoprolol succinate (TOPROL-XL) 25 MG 24 hr tablet Take 2 tablets (50 mg total) by mouth 2 (two) times daily. 120 tablet 11    potassium chloride SA (K-DUR,KLOR-CON) 20 MEQ tablet Take 2 tablets (40 mEq total) by mouth once daily. (Patient taking  differently: Take 20 mEq by mouth 2 (two) times daily.) 30 tablet 0    spironolactone (ALDACTONE) 25 MG tablet Take 1 tablet (25 mg total) by mouth 2 (two) times daily. 60 tablet 11    warfarin (COUMADIN) 4 MG tablet Take 1 tablet (4 mg total) by mouth Daily. Take 4 mg orally once daily except 6 mg orally on Mondays only or as directed by CIS. (Patient taking differently: Take 4 mg by mouth Daily. Take 6 mg Daily except take 4 mg Saturday and Sunday) 30 tablet 0    famotidine (PEPCID) 20 MG tablet Take 1 tablet (20 mg total) by mouth 2 (two) times daily. 60 tablet 11    pantoprazole (PROTONIX) 40 MG tablet Take 1 tablet twice daily 30 min before meals for 2 weeks then Once daily before breakfast for 4 weeks 60 tablet 0    senna-docusate 8.6-50 mg (PERICOLACE) 8.6-50 mg per tablet Take 2 tablets by mouth 2 (two) times daily as needed for Constipation. 60 tablet 0    tadalafiL (CIALIS) 5 MG tablet Take 5 mg by mouth daily as needed for Erectile Dysfunction.       Current Schedule Inpatient Medications:   levETIRAcetam (Keppra) IV (PEDS and ADULTS)  500 mg Intravenous Q12H    mupirocin   Nasal BID    piperacillin-tazobactam (Zosyn) IV (PEDS and ADULTS) (extended infusion is not appropriate)  4.5 g Intravenous ED 1 Time    vancomycin (VANCOCIN) IV (PEDS and ADULTS)  2,000 mg Intravenous ED 1 Time     Continuous Infusions:   dilTIAZem  5 mg/hr Intravenous Continuous 5 mL/hr at 12/06/24 1442 5 mg/hr at 12/06/24 1442    lactated ringers   Intravenous Continuous 125 mL/hr at 12/06/24 1227 New Bag at 12/06/24 1227    propofoL  20 mcg/kg/min Intravenous Continuous 40.1 mL/hr at 12/06/24 1443 50 mcg/kg/min at 12/06/24 1443     Assessment:   Pt intubated a-fib RVR    Plan:   Continue Dilt drip normal LV function  Give Digoxin .5 mg IV once if needed  Continue amiodarone by NG tube  Continue warfarin await INR  If pt therapeutic INR can start IV amiodarone for rate control and conversion  Pt has history of successful a-fib  ablation                               A minimum of two characteristics is recommended for diagnosis of either severe or non-severe malnutrition.    Thank you for your consult.     Parveen Chance MD  Cardiology  [facility]

## 2024-12-06 NOTE — PLAN OF CARE
[facility] - Intensive Care  Initial Discharge Assessment       Primary Care Provider: Flynn Delarosa MD    Admission Diagnosis: Hyperglycemia [R73.9]  Unresponsive [R41.89]  New onset seizure [R56.9]  Atrial fibrillation with RVR [I48.91]  Endotracheal tube present [Z97.8]  Altered mental status, unspecified altered mental status type [R41.82]  Sepsis, due to unspecified organism, unspecified whether acute organ dysfunction present [A41.9]    Admission Date: 12/6/2024  Expected Discharge Date:          Payor: BLUE CROSS BLUE SHIELD / Plan: BCBS BLUE SAVER PPO - HD / Product Type: PPO /     Extended Emergency Contact Information  Primary Emergency Contact: ERLINDA VERDUGO  Mobile Phone: [phone]  Relation: Son  Preferred language: English   needed? No  Secondary Emergency Contact: JARONJUANARTUR  Mobile Phone: [phone]  Relation: Relative  Preferred language: English   needed? No  Mother: Melissa Verdugo  Mobile Phone: [phone]    Discharge Plan A: Other (TBD)  Discharge Plan B: Other (TBD)      [id number] - [place], [place] - [address] AT [facility] OF [facility] [address]  [address]  [facility] [address]  Phone: [phone] Fax: [phone]    [facility] [address] [facility] [address]  [address] [address]  Phone: [phone] Fax: [phone]      Initial Assessment (most recent)       Adult Discharge Assessment - 12/06/24 1544          Discharge Assessment    Assessment Type Discharge Planning Assessment     Confirmed/corrected address, phone number and insurance Yes     Confirmed Demographics Correct on Facesheet     Source of Information family     When was your last doctors appointment? 10/30/24     People in Home significant other;parent(s)     Do you expect to return to your current living situation? Other (see comments)   TBD    Prior to hospitilization cognitive status: Unable to Assess     Current  cognitive status: Unable to Assess     Walking or Climbing Stairs Difficulty no   The patient's family reported that the patient did not utilize any DME to ambulate with. He was independent.    Dressing/Bathing Difficulty no   Independent    Home Accessibility stairs to enter home;stairs within home     Number of Stairs, Within Home, Primary none     Number of Stairs, Main Entrance none     Home Layout Able to live on 1st floor     Equipment Currently Used at Home CPAP     How do you get to doctors appointments? car, drives self     Discharge Plan A Other   TBD    Discharge Plan B Other   TBD                  The patient is currently in the ICU on a vent. The family was present at th patient's bedside. The patient is from home. He lives with his mother and significant other. The patient's ex-spouse, Ying, reports that their son Mega is the Medical Power of .     The family reports that the patient did not use any DME to ambulate with prior to being admitted.  Discharge planning checklist given to patient/family with instructions to contact  for any needs.  SW will follow as needed.      Discharge planning will resume once the patient is medically stable.

## 2024-12-07 PROBLEM — J18.9 PNEUMONIA OF LEFT LOWER LOBE DUE TO INFECTIOUS ORGANISM: Status: ACTIVE | Noted: 2024-12-06

## 2024-12-07 LAB
AC: 14
ALBUMIN SERPL BCP-MCNC: 2.9 G/DL (ref 3.5–5.2)
ALP SERPL-CCNC: 70 U/L (ref 55–135)
ALT SERPL W/O P-5'-P-CCNC: 25 U/L (ref 10–44)
ANION GAP SERPL CALC-SCNC: 12 MMOL/L (ref 3–11)
AST SERPL-CCNC: 31 U/L (ref 10–40)
BASOPHILS # BLD AUTO: 0.05 K/UL (ref 0–0.2)
BASOPHILS NFR BLD: 0.6 % (ref 0–1.9)
BILIRUB SERPL-MCNC: 1.9 MG/DL (ref 0.1–1)
BUN SERPL-MCNC: 22 MG/DL (ref 8–23)
CALCIUM SERPL-MCNC: 8.3 MG/DL (ref 8.7–10.5)
CHLORIDE SERPL-SCNC: 107 MMOL/L (ref 95–110)
CO2 SERPL-SCNC: 23 MMOL/L (ref 23–29)
CREAT SERPL-MCNC: 1.7 MG/DL (ref 0.5–1.4)
DIFFERENTIAL METHOD BLD: ABNORMAL
EOSINOPHIL # BLD AUTO: 0 K/UL (ref 0–0.5)
EOSINOPHIL NFR BLD: 0.1 % (ref 0–8)
ERYTHROCYTE [DISTWIDTH] IN BLOOD BY AUTOMATED COUNT: 13.4 % (ref 11.5–14.5)
EST. GFR  (NO RACE VARIABLE): 45 ML/MIN/1.73 M^2
FIO2: 50 %
GLUCOSE SERPL-MCNC: 328 MG/DL (ref 70–110)
HCT VFR BLD AUTO: 37.8 % (ref 40–54)
HGB BLD-MCNC: 13.8 G/DL (ref 14–18)
IMM GRANULOCYTES # BLD AUTO: 0.02 K/UL (ref 0–0.04)
IMM GRANULOCYTES NFR BLD AUTO: 0.3 % (ref 0–0.5)
INR PPP: 2.4 (ref 0.8–1.2)
LYMPHOCYTES # BLD AUTO: 1.8 K/UL (ref 1–4.8)
LYMPHOCYTES NFR BLD: 23 % (ref 18–48)
MAGNESIUM SERPL-MCNC: 1.8 MG/DL (ref 1.6–2.6)
MCH RBC QN AUTO: 36.2 PG (ref 27–31)
MCHC RBC AUTO-ENTMCNC: 36.5 G/DL (ref 32–36)
MCV RBC AUTO: 99 FL (ref 82–98)
MODIFIED ALLEN'S TEST: ABNORMAL
MONOCYTES # BLD AUTO: 0.7 K/UL (ref 0.3–1)
MONOCYTES NFR BLD: 9.4 % (ref 4–15)
NEUTROPHILS # BLD AUTO: 5.3 K/UL (ref 1.8–7.7)
NEUTROPHILS NFR BLD: 66.6 % (ref 38–73)
NOTIFIED BY: ABNORMAL
NRBC BLD-RTO: 0 /100 WBC
O2DEVICE: ABNORMAL
OHS QRS DURATION: 100 MS
OHS QTC CALCULATION: 511 MS
PCO2 BLDA: 34.5 MMHG (ref 35–45)
PEAK FLOW: 60
PEEP: 5
PH SMN: 7.42 [PH] (ref 7.34–7.45)
PLATELET # BLD AUTO: 82 K/UL (ref 150–450)
PMV BLD AUTO: 9.2 FL (ref 9.2–12.9)
PO2 BLDA: 114 MMHG (ref 80–100)
POC BASE DEFICIT: -2.4 MMOL/L (ref -2–2)
POC HCO3: 22.9 MMOL/L (ref 24–28)
POC NOTIFIED NOTE: ABNORMAL
POC PERFORMED BY: ABNORMAL
POC SATURATED O2: 99.2 % (ref 95–100)
POC TEMPERATURE: 37 C
POCT GLUCOSE: 256 MG/DL (ref 70–110)
POCT GLUCOSE: 311 MG/DL (ref 70–110)
POCT GLUCOSE: 337 MG/DL (ref 70–110)
POTASSIUM SERPL-SCNC: 4.4 MMOL/L (ref 3.5–5.1)
PROT SERPL-MCNC: 6.6 G/DL (ref 6–8.4)
PROTHROMBIN TIME: 24.1 SEC (ref 9–12.5)
PROVIDER NOTIFIED: ABNORMAL
RBC # BLD AUTO: 3.81 M/UL (ref 4.6–6.2)
SODIUM SERPL-SCNC: 142 MMOL/L (ref 136–145)
SPECIMEN SOURCE: ABNORMAL
T4 FREE SERPL-MCNC: 1.13 NG/DL (ref 0.71–1.51)
TSH SERPL DL<=0.005 MIU/L-ACNC: 0.41 UIU/ML (ref 0.4–4)
VANCOMYCIN SERPL-MCNC: 8.5 UG/ML
VT: 600
WBC # BLD AUTO: 7.91 K/UL (ref 3.9–12.7)

## 2024-12-07 PROCEDURE — 63600175 PHARM REV CODE 636 W HCPCS

## 2024-12-07 PROCEDURE — 20000000 HC ICU ROOM

## 2024-12-07 PROCEDURE — 99900017 HC EXTUBATION W/PARAMETERS (STAT)

## 2024-12-07 PROCEDURE — 84439 ASSAY OF FREE THYROXINE: CPT | Performed by: STUDENT IN AN ORGANIZED HEALTH CARE EDUCATION/TRAINING PROGRAM

## 2024-12-07 PROCEDURE — 84443 ASSAY THYROID STIM HORMONE: CPT | Performed by: STUDENT IN AN ORGANIZED HEALTH CARE EDUCATION/TRAINING PROGRAM

## 2024-12-07 PROCEDURE — 99900035 HC TECH TIME PER 15 MIN (STAT)

## 2024-12-07 PROCEDURE — 27000221 HC OXYGEN, UP TO 24 HOURS

## 2024-12-07 PROCEDURE — 99233 SBSQ HOSP IP/OBS HIGH 50: CPT | Mod: ,,, | Performed by: STUDENT IN AN ORGANIZED HEALTH CARE EDUCATION/TRAINING PROGRAM

## 2024-12-07 PROCEDURE — 85025 COMPLETE CBC W/AUTO DIFF WBC: CPT | Performed by: STUDENT IN AN ORGANIZED HEALTH CARE EDUCATION/TRAINING PROGRAM

## 2024-12-07 PROCEDURE — 83735 ASSAY OF MAGNESIUM: CPT | Performed by: STUDENT IN AN ORGANIZED HEALTH CARE EDUCATION/TRAINING PROGRAM

## 2024-12-07 PROCEDURE — 85610 PROTHROMBIN TIME: CPT | Performed by: STUDENT IN AN ORGANIZED HEALTH CARE EDUCATION/TRAINING PROGRAM

## 2024-12-07 PROCEDURE — 36600 WITHDRAWAL OF ARTERIAL BLOOD: CPT

## 2024-12-07 PROCEDURE — 25000003 PHARM REV CODE 250: Performed by: STUDENT IN AN ORGANIZED HEALTH CARE EDUCATION/TRAINING PROGRAM

## 2024-12-07 PROCEDURE — 99900031 HC PATIENT EDUCATION (STAT)

## 2024-12-07 PROCEDURE — 63600175 PHARM REV CODE 636 W HCPCS: Performed by: INTERNAL MEDICINE

## 2024-12-07 PROCEDURE — 82803 BLOOD GASES ANY COMBINATION: CPT

## 2024-12-07 PROCEDURE — 94761 N-INVAS EAR/PLS OXIMETRY MLT: CPT

## 2024-12-07 PROCEDURE — 80202 ASSAY OF VANCOMYCIN: CPT | Performed by: INTERNAL MEDICINE

## 2024-12-07 PROCEDURE — 63600175 PHARM REV CODE 636 W HCPCS: Performed by: STUDENT IN AN ORGANIZED HEALTH CARE EDUCATION/TRAINING PROGRAM

## 2024-12-07 PROCEDURE — 80053 COMPREHEN METABOLIC PANEL: CPT | Performed by: STUDENT IN AN ORGANIZED HEALTH CARE EDUCATION/TRAINING PROGRAM

## 2024-12-07 PROCEDURE — 27100171 HC OXYGEN HIGH FLOW UP TO 24 HOURS

## 2024-12-07 PROCEDURE — 94003 VENT MGMT INPAT SUBQ DAY: CPT

## 2024-12-07 PROCEDURE — 99900026 HC AIRWAY MAINTENANCE (STAT)

## 2024-12-07 PROCEDURE — 36415 COLL VENOUS BLD VENIPUNCTURE: CPT | Performed by: STUDENT IN AN ORGANIZED HEALTH CARE EDUCATION/TRAINING PROGRAM

## 2024-12-07 RX ORDER — METOPROLOL TARTRATE 25 MG/1
50 TABLET, FILM COATED ORAL 2 TIMES DAILY
Status: DISCONTINUED | OUTPATIENT
Start: 2024-12-07 | End: 2024-12-07

## 2024-12-07 RX ORDER — INSULIN GLARGINE 100 [IU]/ML
25 INJECTION, SOLUTION SUBCUTANEOUS DAILY
Status: DISCONTINUED | OUTPATIENT
Start: 2024-12-07 | End: 2024-12-07

## 2024-12-07 RX ORDER — WARFARIN 1 MG/1
4 TABLET ORAL
Status: DISCONTINUED | OUTPATIENT
Start: 2024-12-07 | End: 2024-12-10 | Stop reason: HOSPADM

## 2024-12-07 RX ORDER — SPIRONOLACTONE 25 MG/1
25 TABLET ORAL 2 TIMES DAILY
Status: DISCONTINUED | OUTPATIENT
Start: 2024-12-07 | End: 2024-12-10 | Stop reason: HOSPADM

## 2024-12-07 RX ORDER — INSULIN GLARGINE 100 [IU]/ML
33 INJECTION, SOLUTION SUBCUTANEOUS DAILY
Status: DISCONTINUED | OUTPATIENT
Start: 2024-12-08 | End: 2024-12-08

## 2024-12-07 RX ORDER — AMIODARONE HYDROCHLORIDE 100 MG/1
100 TABLET ORAL EVERY MORNING
Status: DISCONTINUED | OUTPATIENT
Start: 2024-12-07 | End: 2024-12-07

## 2024-12-07 RX ORDER — FAMOTIDINE 10 MG/ML
20 INJECTION INTRAVENOUS 2 TIMES DAILY
Status: DISCONTINUED | OUTPATIENT
Start: 2024-12-07 | End: 2024-12-08

## 2024-12-07 RX ORDER — METOPROLOL SUCCINATE 25 MG/1
50 TABLET, EXTENDED RELEASE ORAL 2 TIMES DAILY
Status: DISCONTINUED | OUTPATIENT
Start: 2024-12-07 | End: 2024-12-08

## 2024-12-07 RX ORDER — VANCOMYCIN 1.75 GRAM/500 ML IN 0.9 % SODIUM CHLORIDE INTRAVENOUS
1750
Status: DISCONTINUED | OUTPATIENT
Start: 2024-12-07 | End: 2024-12-08

## 2024-12-07 RX ORDER — AMIODARONE HYDROCHLORIDE 100 MG/1
100 TABLET ORAL EVERY MORNING
Status: DISCONTINUED | OUTPATIENT
Start: 2024-12-08 | End: 2024-12-08

## 2024-12-07 RX ORDER — MAGNESIUM SULFATE HEPTAHYDRATE 40 MG/ML
2 INJECTION, SOLUTION INTRAVENOUS ONCE
Status: COMPLETED | OUTPATIENT
Start: 2024-12-07 | End: 2024-12-07

## 2024-12-07 RX ADMIN — SODIUM CHLORIDE, POTASSIUM CHLORIDE, SODIUM LACTATE AND CALCIUM CHLORIDE: 600; 310; 30; 20 INJECTION, SOLUTION INTRAVENOUS at 12:12

## 2024-12-07 RX ADMIN — FAMOTIDINE 20 MG: 10 INJECTION, SOLUTION INTRAVENOUS at 09:12

## 2024-12-07 RX ADMIN — WARFARIN SODIUM 4 MG: 1 TABLET ORAL at 05:12

## 2024-12-07 RX ADMIN — PROPOFOL 20 MCG/KG/MIN: 10 INJECTION, EMULSION INTRAVENOUS at 07:12

## 2024-12-07 RX ADMIN — CEFEPIME 2 G: 2 INJECTION, POWDER, FOR SOLUTION INTRAVENOUS at 08:12

## 2024-12-07 RX ADMIN — Medication 1750 MG: at 08:12

## 2024-12-07 RX ADMIN — INSULIN ASPART 3 UNITS: 100 INJECTION, SOLUTION INTRAVENOUS; SUBCUTANEOUS at 04:12

## 2024-12-07 RX ADMIN — INSULIN ASPART 2 UNITS: 100 INJECTION, SOLUTION INTRAVENOUS; SUBCUTANEOUS at 08:12

## 2024-12-07 RX ADMIN — PROPOFOL 20 MCG/KG/MIN: 10 INJECTION, EMULSION INTRAVENOUS at 04:12

## 2024-12-07 RX ADMIN — MUPIROCIN: 20 OINTMENT TOPICAL at 08:12

## 2024-12-07 RX ADMIN — INSULIN GLARGINE 25 UNITS: 100 INJECTION, SOLUTION SUBCUTANEOUS at 08:12

## 2024-12-07 RX ADMIN — SODIUM CHLORIDE, POTASSIUM CHLORIDE, SODIUM LACTATE AND CALCIUM CHLORIDE: 600; 310; 30; 20 INJECTION, SOLUTION INTRAVENOUS at 09:12

## 2024-12-07 RX ADMIN — INSULIN ASPART 4 UNITS: 100 INJECTION, SOLUTION INTRAVENOUS; SUBCUTANEOUS at 08:12

## 2024-12-07 RX ADMIN — LEVETIRACETAM 500 MG: 100 INJECTION, SOLUTION INTRAVENOUS at 08:12

## 2024-12-07 RX ADMIN — AMIODARONE HYDROCHLORIDE 100 MG: 100 TABLET ORAL at 09:12

## 2024-12-07 RX ADMIN — METOPROLOL SUCCINATE 50 MG: 25 TABLET, EXTENDED RELEASE ORAL at 06:12

## 2024-12-07 RX ADMIN — FAMOTIDINE 20 MG: 10 INJECTION, SOLUTION INTRAVENOUS at 08:12

## 2024-12-07 RX ADMIN — DIGOXIN 125 MCG: 0.25 INJECTION INTRAMUSCULAR; INTRAVENOUS at 10:12

## 2024-12-07 RX ADMIN — METOPROLOL TARTRATE 50 MG: 25 TABLET, FILM COATED ORAL at 10:12

## 2024-12-07 RX ADMIN — SPIRONOLACTONE 25 MG: 25 TABLET ORAL at 06:12

## 2024-12-07 RX ADMIN — MAGNESIUM SULFATE HEPTAHYDRATE 2 G: 40 INJECTION, SOLUTION INTRAVENOUS at 07:12

## 2024-12-07 RX ADMIN — PROPOFOL 40 MCG/KG/MIN: 10 INJECTION, EMULSION INTRAVENOUS at 02:12

## 2024-12-07 RX ADMIN — INSULIN ASPART 4 UNITS: 100 INJECTION, SOLUTION INTRAVENOUS; SUBCUTANEOUS at 05:12

## 2024-12-07 RX ADMIN — CEFEPIME 2 G: 2 INJECTION, POWDER, FOR SOLUTION INTRAVENOUS at 04:12

## 2024-12-07 RX ADMIN — INSULIN ASPART 4 UNITS: 100 INJECTION, SOLUTION INTRAVENOUS; SUBCUTANEOUS at 11:12

## 2024-12-07 RX ADMIN — CEFEPIME 2 G: 2 INJECTION, POWDER, FOR SOLUTION INTRAVENOUS at 01:12

## 2024-12-07 NOTE — PROGRESS NOTES
Pharmacokinetic Assessment Follow Up: IV Vancomycin    Vancomycin serum concentration assessment(s):    The random level was drawn correctly and can be used to guide therapy at this time. The measurement is below the desired definitive target range of 15 to 20 mcg/mL.    Vancomycin Regimen Plan:    Change regimen to Vancomycin 1750 mg IV every 24 hours with next serum trough concentration measured at 0730 prior to 3rd dose on 12/08/24    Drug levels (last 3 results):  Recent Labs   Lab Result Units 12/07/24  0357   Vancomycin, Random ug/mL 8.5       Pharmacy will continue to follow and monitor vancomycin.    Please contact pharmacy at extension [phone] for questions regarding this assessment.    Thank you for the consult,   Michelle Clifford       Patient brief summary:  Chapo Rosario is a 64 y.o. male initiated on antimicrobial therapy with IV Vancomycin for treatment of sepsis    The patient's current regimen is Vancomycin 1750 mg q24hr. With patient's renal improvement, we will begin a scheduled regimen.     Drug Allergies:   Review of patient's allergies indicates:  No Known Allergies    Actual Body Weight:   133.8 kg    Renal Function:   Estimated Creatinine Clearance: 65.5 mL/min (A) (based on SCr of 1.7 mg/dL (H)).,     Dialysis Method (if applicable):  N/A    CBC (last 72 hours):  Recent Labs   Lab Result Units 12/06/24  0900 12/06/24  1652 12/07/24  0357   WBC K/uL 20.01*  --  7.91   Hemoglobin g/dL 16.3  --  13.8*   Hemoglobin A1C %  --  8.1*  --    Hematocrit % 46.9  --  37.8*   Platelets K/uL 193  --  82*   Gran % % 64.8  --  66.6   Lymph % % 23.5  --  23.0   Mono % % 9.7  --  9.4   Eosinophil % % 0.1  --  0.1   Basophil % % 0.5  --  0.6   Differential Method  Automated  --  Automated       Metabolic Panel (last 72 hours):  Recent Labs   Lab Result Units 12/06/24  0900 12/06/24  1044 12/06/24 2003 12/06/24 2014 12/07/24  0357   Sodium mmol/L 139  --   --   --  142   Potassium mmol/L 4.3  --   --   --  4.4    Chloride mmol/L 102  --   --   --  107   CO2 mmol/L 12*  --   --   --  23   Glucose mg/dL 474*  --   --   --  328*   Glucose, UA   --  4+*  --  4+*  --    BUN mg/dL 18  --   --   --  22   Creatinine mg/dL 2.0*  --   --   --  1.7*   Creatinine, Urine mg/dL  --  44.2 43.4  --   --    Albumin g/dL 3.8  --   --   --  2.9*   Total Bilirubin mg/dL 1.4*  --   --   --  1.9*   Alkaline Phosphatase U/L 103  --   --   --  70   AST U/L 31  --   --   --  31   ALT U/L 37  --   --   --  25   Magnesium mg/dL  --   --   --   --  1.8       Vancomycin Administrations:  vancomycin given in the last 96 hours                     vancomycin 2 g in 0.9% sodium chloride 500 mL IVPB ()  Restarted 12/06/24 1717     2,000 mg New Bag  1434                    Microbiologic Results:  Microbiology Results (last 7 days)       Procedure Component Value Units Date/Time    Blood culture #2 **CANNOT BE ORDERED STAT** [[id number]] Collected: 12/06/24 1033    Order Status: Completed Specimen: Blood from Peripheral, Forearm, Left Updated: 12/07/24 0115     Blood Culture, Routine No Growth to date    Blood culture #1 **CANNOT BE ORDERED STAT** [[id number]] Collected: 12/06/24 1023    Order Status: Completed Specimen: Blood from Peripheral, Antecubital, Right Updated: 12/07/24 0115     Blood Culture, Routine No Growth to date    Culture, Respiratory with Gram Stain [[id number]] Collected: 12/06/24 1025    Order Status: Completed Specimen: Sputum from Endotracheal Aspirate Updated: 12/06/24 2127     Gram Stain (Respiratory) <10 epithelial cells per low power field.     Gram Stain (Respiratory) Rare WBC's     Gram Stain (Respiratory) Rare Gram positive cocci

## 2024-12-07 NOTE — SUBJECTIVE & OBJECTIVE
Past Medical History:   Diagnosis Date    LEEANN (acute kidney injury) 11/13/2023    Anticoagulant long-term use     Aortic aneurysm     Atrial fibrillation     COVID-19 vaccine administered     Group D streptococcal infection     Hyperlipidemia     Hypertension     Pneumonia, unspecified organism     Severe sepsis 08/30/2018    Sleep apnea     Thrombocytopenia 11/13/2023    VHD (valvular heart disease)        Past Surgical History:   Procedure Laterality Date    ABLATION N/A 4/1/2024    Procedure: Afib Ablation;  Surgeon: Hung Stacy MD;  Location: [facility] CATH;  Service: Cardiology;  Laterality: N/A;  ops # 5    aortic valve replacement      APPENDECTOMY      CARDIAC SURGERY  2013    MECHANICAL AVR    CHOLECYSTECTOMY      COLONOSCOPY N/A 2/14/2024    Procedure: COLONOSCOPY;  Surgeon: Ta Flowers MD;  Location: [facility] ENDO;  Service: Endoscopy;  Laterality: N/A;    CORONARY ANGIOGRAPHY      ECHOCARDIOGRAM, TRANSESOPHAGEAL      ESOPHAGOGASTRODUODENOSCOPY N/A 2/14/2024    Procedure: EGD (ESOPHAGOGASTRODUODENOSCOPY);  Surgeon: Ta Flowers MD;  Location: [facility] ENDO;  Service: Endoscopy;  Laterality: N/A;    NECK SURGERY      TRANSESOPHAGEAL ECHOCARDIOGRAPHY N/A 11/17/2023    Procedure: ECHOCARDIOGRAM, TRANSESOPHAGEAL;  Surgeon: Antonio Ortiz MD;  Location: [facility] ENDO;  Service: Cardiovascular;  Laterality: N/A;    TREATMENT OF CARDIAC ARRHYTHMIA N/A 11/17/2023    Procedure: Cardioversion or Defibrillation;  Surgeon: Antonio Ortiz MD;  Location: [facility] ENDO;  Service: Cardiovascular;  Laterality: N/A;       Review of patient's allergies indicates:  No Known Allergies    Current Facility-Administered Medications on File Prior to Encounter   Medication    0.9%  NaCl infusion     Current Outpatient Medications on File Prior to Encounter   Medication Sig    amiodarone (PACERONE) 200 MG Tab Take 0.5 tablets (100 mg total) by mouth once daily. (Patient taking differently: Take 100 mg by mouth every  morning.)    aspirin (ECOTRIN) 81 MG EC tablet Take 81 mg by mouth once daily.    cholecalciferol, vitamin D3, (VITAMIN D3) 125 mcg (5,000 unit) Tab Take 5,000 Units by mouth once daily.    fenofibrate micronized (LOFIBRA) 134 MG Cap Take 134 mg by mouth daily with breakfast.    furosemide (LASIX) 40 MG tablet If increased SOB or weight gain greater or equal to 3lbs in 24 hours increase lasix to 40mg daily x 3 days then resume 20mg daily (Patient taking differently: Take 20 mg by mouth once daily.)    magnesium oxide (MAG-OX) 400 mg (241.3 mg magnesium) tablet Take 1 tablet (400 mg total) by mouth 2 (two) times daily.    metoprolol succinate (TOPROL-XL) 25 MG 24 hr tablet Take 2 tablets (50 mg total) by mouth 2 (two) times daily.    potassium chloride SA (K-DUR,KLOR-CON) 20 MEQ tablet Take 2 tablets (40 mEq total) by mouth once daily. (Patient taking differently: Take 20 mEq by mouth 2 (two) times daily.)    spironolactone (ALDACTONE) 25 MG tablet Take 1 tablet (25 mg total) by mouth 2 (two) times daily.    warfarin (COUMADIN) 4 MG tablet Take 1 tablet (4 mg total) by mouth Daily. Take 4 mg orally once daily except 6 mg orally on Mondays only or as directed by [facility]. (Patient taking differently: Take 4 mg by mouth Daily. Take 6 mg Daily except take 4 mg Saturday and Sunday)    famotidine (PEPCID) 20 MG tablet Take 1 tablet (20 mg total) by mouth 2 (two) times daily.    pantoprazole (PROTONIX) 40 MG tablet Take 1 tablet twice daily 30 min before meals for 2 weeks then Once daily before breakfast for 4 weeks    senna-docusate 8.6-50 mg (PERICOLACE) 8.6-50 mg per tablet Take 2 tablets by mouth 2 (two) times daily as needed for Constipation.    tadalafiL (CIALIS) 5 MG tablet Take 5 mg by mouth daily as needed for Erectile Dysfunction.     Family History       Problem Relation (Age of Onset)    Cancer Brother    Heart disease Father    No Known Problems Mother          Tobacco Use    Smoking status: Former     Passive  "exposure: Never    Smokeless tobacco: Not on file   Substance and Sexual Activity    Alcohol use: Never    Drug use: Never    Sexual activity: Yes     Review of Systems   Unable to perform ROS: Intubated     Objective:     Vital Signs (Most Recent):  Temp: 97.9 °F (36.6 °C) (12/06/24 1915)  Pulse: 84 (12/06/24 2100)  Resp: 16 (12/06/24 2100)  BP: (!) 88/51 (12/06/24 2100)  SpO2: 100 % (12/06/24 2100) Vital Signs (24h Range):  Temp:  [97.9 °F (36.6 °C)-99.7 °F (37.6 °C)] 97.9 °F (36.6 °C)  Pulse:  [] 84  Resp:  [16-33] 16  SpO2:  [95 %-100 %] 100 %  BP: ()/(51-84) 88/51     Weight: 133.8 kg (295 lb)  Body mass index is 37.88 kg/m².     Physical Exam  Constitutional:       General: He is not in acute distress.     Appearance: He is ill-appearing.   HENT:      Head: Normocephalic.      Mouth/Throat:      Comments: ET tube in place, OG tube in place  Cardiovascular:      Rate and Rhythm: Regular rhythm. Tachycardia present.      Heart sounds: No murmur heard.  Pulmonary:      Comments: Vent sounds reaching lung bases bilaterally  Abdominal:      General: There is no distension.      Palpations: Abdomen is soft.      Tenderness: There is no guarding.   Musculoskeletal:      Cervical back: Neck supple.      Right lower leg: No edema.      Left lower leg: No edema.   Skin:     General: Skin is warm and dry.      Capillary Refill: Capillary refill takes less than 2 seconds.                Significant Labs: All pertinent labs within the past 24 hours have been reviewed.  A1C:   Recent Labs   Lab 12/06/24  1652   HGBA1C 8.1*     ABGs:   Recent Labs   Lab 12/06/24  0952   PH 7.202*   PCO2 40.1   HCO3 15.7*   POCSATURATED 99.4   PO2 128*     Bilirubin:   Recent Labs   Lab 12/06/24  0900   BILITOT 1.4*     Blood Culture: No results for input(s): "LABBLOO" in the last 48 hours.  BMP:   Recent Labs   Lab 12/06/24  0900   *      K 4.3      CO2 12*   BUN 18   CREATININE 2.0*   CALCIUM 9.4     CBC: "   Recent Labs   Lab 12/06/24  0900   WBC 20.01*   HGB 16.3   HCT 46.9        CMP:   Recent Labs   Lab 12/06/24  0900      K 4.3      CO2 12*   *   BUN 18   CREATININE 2.0*   CALCIUM 9.4   PROT 8.5*   ALBUMIN 3.8   BILITOT 1.4*   ALKPHOS 103   AST 31   ALT 37   ANIONGAP 25*     Recent Labs   Lab 12/06/24  0900   INR 2.7*   APTT 39.0*     Lactic Acid:   Recent Labs   Lab 12/06/24  1023 12/06/24  1652 12/06/24  2038   LACTATE 9.2* 2.5* 1.9       Recent Labs   Lab 12/06/24  1247 12/06/24  1622   POCTGLUCOSE 295* 260*      Recent Labs   Lab 12/06/24  1025   GSRESP <10 epithelial cells per low power field.  Rare WBC's  Rare Gram positive cocci     Troponin:   Recent Labs   Lab 12/06/24  0900   TROPONINIHS 15.6     Recent Labs   Lab 12/06/24 2014   COLORU Orange*   APPEARANCEUA Cloudy*   PHUR 5.0   SPECGRAV >=1.030*   PROTEINUA Trace*   GLUCUA 4+*   KETONESU Trace*   BILIRUBINUA Negative   OCCULTUA 3+*   NITRITE Negative   UROBILINOGEN Negative   LEUKOCYTESUR Negative   RBCUA 31*   WBCUA 3   BACTERIA Negative   SQUAMEPITHEL 1   HYALINECASTS 1.5*     X-Ray Chest 1 View  Narrative: EXAMINATION:  XR CHEST 1 VIEW    CLINICAL HISTORY:  Other symptoms and signs involving cognitive functions and awareness    TECHNIQUE:  portable chest x-ray    COMPARISON:  11/17/2023    FINDINGS:  ET tube and NG tube are in place.  There is left basilar infiltrate.  Impression: ET tube and NG tube in place with left basilar infiltrate    Electronically signed by: Nury Vargas MD  Date:    12/06/2024  Time:    11:20  X-Ray Chest 1 View  Narrative: EXAMINATION:  XR CHEST 1 VIEW    CLINICAL HISTORY:  Presence of other specified devices    TECHNIQUE:  portable chest x-ray    COMPARISON:  12/06/2024 at 09:36.  <Comparisons>    FINDINGS:  There is ET tube and NG tube in place.  There is left basilar infiltrate.  Impression: ET tube and NG tube in place with sternotomy wires with left basilar infiltrate.    Electronically  signed by: Nury Vargas MD  Date:    12/06/2024  Time:    11:12  CT Head Without Contrast  Narrative: EXAMINATION:  CT HEAD WITHOUT CONTRAST    CLINICAL HISTORY:  Mental status change, unknown cause;    CT/nuclear cardiac exams in previous 12 months: None    TECHNIQUE:  Axial CT images were obtained. Iterative reconstruction technique was used.    COMPARISON:  None    FINDINGS:  No intracranial hemorrhage, mass, mass effect or recent infarct evident.  Old lacunar infarct suspected of the left basal ganglia.  Small remote infarcts also questioned of the left cerebellar hemisphere and right frontal lobe.  Ventricles are not enlarged.  Visualized paranasal sinuses and mastoid air cells are clear.  Calvarium is intact.  Impression: No evidence of an acute intracranial abnormality.    Suspected small remote infarcts as above.    Electronically signed by: Antonio Joiner MD  Date:    12/06/2024  Time:    10:01     Significant Imaging: I have reviewed all pertinent imaging results/findings within the past 24 hours.

## 2024-12-07 NOTE — ASSESSMENT & PLAN NOTE
Per patient's family no known follow up for hyperglycemia.   Lab Results   Component Value Date    HGBA1C 8.1 (H) 12/06/2024    HGBA1C 6.4 (H) 11/16/2023      Lab Results   Component Value Date     (H) 12/07/2024     (HH) 12/06/2024     (H) 04/02/2024   Start basal insulin and monitor   POC glucose QID and SSI low dose

## 2024-12-07 NOTE — PLAN OF CARE
Plan of care discussed with patients family. Cardio consulted via [employer]. Verbal order per Dr. Chance to administer Digoxin 0.5 IVP one time if hr >125. Stevens output 850. Urine now cloudy, purulent, blood tinged. Dr. Romero at bedside to assess states she will place order to culture urine. Patient remains in restraints x 2, no injury, Vitals remain stable. CBG now 260, new order for sliding scale insulin. 3 units administered. Will continue to monitor.     Problem: Mechanical Ventilation Invasive  Goal: Effective Communication  Outcome: Progressing  Goal: Optimal Device Function  Outcome: Progressing  Goal: Mechanical Ventilation Liberation  Outcome: Progressing  Goal: Optimal Nutrition Delivery  Outcome: Progressing  Goal: Absence of Device-Related Skin and Tissue Injury  Outcome: Progressing  Goal: Absence of Ventilator-Induced Lung Injury  Outcome: Progressing     Problem: Artificial Airway  Goal: Effective Communication  Outcome: Progressing  Goal: Optimal Device Function  Outcome: Progressing  Goal: Absence of Device-Related Skin or Tissue Injury  Outcome: Progressing     Problem: Infection  Goal: Absence of Infection Signs and Symptoms  Outcome: Progressing     Problem: Adult Inpatient Plan of Care  Goal: Plan of Care Review  Outcome: Progressing  Goal: Patient-Specific Goal (Individualized)  Outcome: Progressing  Goal: Absence of Hospital-Acquired Illness or Injury  Outcome: Progressing  Goal: Optimal Comfort and Wellbeing  Outcome: Progressing  Goal: Readiness for Transition of Care  Outcome: Progressing     Problem: Fall Injury Risk  Goal: Absence of Fall and Fall-Related Injury  Outcome: Progressing     Problem: Restraint, Nonviolent  Goal: Absence of Harm or Injury  Outcome: Progressing     Problem: Skin Injury Risk Increased  Goal: Skin Health and Integrity  Outcome: Progressing

## 2024-12-07 NOTE — H&P
"  [facility] Medicine  History & Physical    Patient Name: Chapo Rosario  MRN: [id number]  Patient Class: IP- Inpatient  Admission Date: 12/6/2024  Attending Physician: Parveen Salazar Jr., MD   Primary Care Provider: Flynn Delarosa MD         Patient information was obtained from spouse/SO, parent, and ER records.     Subjective:     Principal Problem:Sepsis with encephalopathy without septic shock    Chief Complaint:   Chief Complaint   Patient presents with    Unresponsive     EMS reports, "Unresponsive with pinpoint pupils upon arrival. Fire depart gave 2 mg of Narcan IN, no improvement. Last seen normal last night at 2300. 22 gauge left thumb. Narcan 2 mg given IV, started responding to voice. Uncooperative and flailing. History of sepsis. Had seizure en route, became apneic, started bagging. ."         HPI: 64 year old with hypertension, hyperlipidemia, obstructive sleep apnea on CPAP, history of bicuspid aortic valve and dilated aortic root s/p mechanical AVR on coumadin was brought in to ER via EMS with following reports;   EMS reports, "Unresponsive with pinpoint pupils upon arrival. Fire depart gave 2 mg of Narcan, no improvement. Last seen normal last night at 2300. 22 gauge left thumb. Narcan 2 mg given IV, started responding to voice. Uncooperative and flailing. History of sepsis. Had seizure en route, became apneic, started bagging. ."  ED course: On arrival vital signs /65 mmHg, , RR 26, SpO2 96% on room air, temp 99.7F.   Patient intubated in ER; ABG following 7.2/40.1/128/15.7 SpO2 99% FiO2 100% on vent. Patient loaded with keppra.  Sepsis work up; WBC 20K, Hg 16.3, HCT 46.9,   Serum Na  139, K 4.3, Cl 102, CO2 12, Glu 474, BUN 18, Cr. 2( baseline 1), AG 25,    Alb 3.8, Tbili 1.4, AST 31, ALT 37, Alk phos 103  Lactate 9.2 à 2.5   Cardiac workup troponin I hs 15.6, NT proBNP 1357, EKG tracings atrial fibrillation, rate 147, non-specific " ST-T changes.  Head CT with no acute intracranial findings. CXR with left basilar infiltrates. Urinalysis significant of glucosuria.    Patient received NS bolus x 2.5L followed by continuous /hr. Started on diltiazem gtt. Rocephin 1g.   Patient admitted to ICU with continued management of sepsis, atrial fibrillation with RVR, mechanical ventilation.   Additional history from family: He was last seen out of bed at 7:30AM by his fiance who reports patient went back to bed. The night before patient's mother accounts after dinner, taking a shower and going to bed around 11 PM. Overnight patient woke up in confusion and urinated in the hamper. Patient had complained of some weakness starting Monday early in the week. EMS was called after patient was shaking in bed and unreponsive at 8AM.   Telemetry tracings normal sinus rhythym rate 108, /62 mmHg, ET tube in place connected to vent AC Vt 600, RR 14, PEEP5, FiO2 70%, SpO2 100%.  Stevens catheter draining purulent urine, cloudy and pink.  Lactate levels down trending.  Resent urine for urinalysis, continue with broad spectrum abx, continue with IVF /hr.    Past Medical History:   Diagnosis Date    LEEANN (acute kidney injury) 11/13/2023    Anticoagulant long-term use     Aortic aneurysm     Atrial fibrillation     COVID-19 vaccine administered     Group D streptococcal infection     Hyperlipidemia     Hypertension     Pneumonia, unspecified organism     Severe sepsis 08/30/2018    Sleep apnea     Thrombocytopenia 11/13/2023    VHD (valvular heart disease)        Past Surgical History:   Procedure Laterality Date    ABLATION N/A 4/1/2024    Procedure: Afib Ablation;  Surgeon: Hung Stacy MD;  Location: [facility] CATH;  Service: Cardiology;  Laterality: N/A;  ops # 5    aortic valve replacement      APPENDECTOMY      CARDIAC SURGERY  2013    MECHANICAL AVR    CHOLECYSTECTOMY      COLONOSCOPY N/A 2/14/2024    Procedure: COLONOSCOPY;  Surgeon: Ta Flowers  MD DONAL;  Location: [facility];  Service: Endoscopy;  Laterality: N/A;    CORONARY ANGIOGRAPHY      ECHOCARDIOGRAM, TRANSESOPHAGEAL      ESOPHAGOGASTRODUODENOSCOPY N/A 2/14/2024    Procedure: EGD (ESOPHAGOGASTRODUODENOSCOPY);  Surgeon: Ta Flowers MD;  Location: [facility] ENDO;  Service: Endoscopy;  Laterality: N/A;    NECK SURGERY      TRANSESOPHAGEAL ECHOCARDIOGRAPHY N/A 11/17/2023    Procedure: ECHOCARDIOGRAM, TRANSESOPHAGEAL;  Surgeon: Antonio Ortiz MD;  Location: [facility] ENDO;  Service: Cardiovascular;  Laterality: N/A;    TREATMENT OF CARDIAC ARRHYTHMIA N/A 11/17/2023    Procedure: Cardioversion or Defibrillation;  Surgeon: Antonio Ortiz MD;  Location: [facility];  Service: Cardiovascular;  Laterality: N/A;       Review of patient's allergies indicates:  No Known Allergies    Current Facility-Administered Medications on File Prior to Encounter   Medication    0.9%  NaCl infusion     Current Outpatient Medications on File Prior to Encounter   Medication Sig    amiodarone (PACERONE) 200 MG Tab Take 0.5 tablets (100 mg total) by mouth once daily. (Patient taking differently: Take 100 mg by mouth every morning.)    aspirin (ECOTRIN) 81 MG EC tablet Take 81 mg by mouth once daily.    cholecalciferol, vitamin D3, (VITAMIN D3) 125 mcg (5,000 unit) Tab Take 5,000 Units by mouth once daily.    fenofibrate micronized (LOFIBRA) 134 MG Cap Take 134 mg by mouth daily with breakfast.    furosemide (LASIX) 40 MG tablet If increased SOB or weight gain greater or equal to 3lbs in 24 hours increase lasix to 40mg daily x 3 days then resume 20mg daily (Patient taking differently: Take 20 mg by mouth once daily.)    magnesium oxide (MAG-OX) 400 mg (241.3 mg magnesium) tablet Take 1 tablet (400 mg total) by mouth 2 (two) times daily.    metoprolol succinate (TOPROL-XL) 25 MG 24 hr tablet Take 2 tablets (50 mg total) by mouth 2 (two) times daily.    potassium chloride SA (K-DUR,KLOR-CON) 20 MEQ tablet Take 2 tablets (40 mEq  total) by mouth once daily. (Patient taking differently: Take 20 mEq by mouth 2 (two) times daily.)    spironolactone (ALDACTONE) 25 MG tablet Take 1 tablet (25 mg total) by mouth 2 (two) times daily.    warfarin (COUMADIN) 4 MG tablet Take 1 tablet (4 mg total) by mouth Daily. Take 4 mg orally once daily except 6 mg orally on Mondays only or as directed by [facility]. (Patient taking differently: Take 4 mg by mouth Daily. Take 6 mg Daily except take 4 mg Saturday and Sunday)    famotidine (PEPCID) 20 MG tablet Take 1 tablet (20 mg total) by mouth 2 (two) times daily.    pantoprazole (PROTONIX) 40 MG tablet Take 1 tablet twice daily 30 min before meals for 2 weeks then Once daily before breakfast for 4 weeks    senna-docusate 8.6-50 mg (PERICOLACE) 8.6-50 mg per tablet Take 2 tablets by mouth 2 (two) times daily as needed for Constipation.    tadalafiL (CIALIS) 5 MG tablet Take 5 mg by mouth daily as needed for Erectile Dysfunction.     Family History       Problem Relation (Age of Onset)    Cancer Brother    Heart disease Father    No Known Problems Mother          Tobacco Use    Smoking status: Former     Passive exposure: Never    Smokeless tobacco: Not on file   Substance and Sexual Activity    Alcohol use: Never    Drug use: Never    Sexual activity: Yes     Review of Systems   Unable to perform ROS: Intubated     Objective:     Vital Signs (Most Recent):  Temp: 97.9 °F (36.6 °C) (12/06/24 1915)  Pulse: 84 (12/06/24 2100)  Resp: 16 (12/06/24 2100)  BP: (!) 88/51 (12/06/24 2100)  SpO2: 100 % (12/06/24 2100) Vital Signs (24h Range):  Temp:  [97.9 °F (36.6 °C)-99.7 °F (37.6 °C)] 97.9 °F (36.6 °C)  Pulse:  [] 84  Resp:  [16-33] 16  SpO2:  [95 %-100 %] 100 %  BP: ()/(51-84) 88/51     Weight: 133.8 kg (295 lb)  Body mass index is 37.88 kg/m².     Physical Exam  Constitutional:       General: He is not in acute distress.     Appearance: He is ill-appearing.   HENT:      Head: Normocephalic.      Mouth/Throat:      " Comments: ET tube in place, OG tube in place  Cardiovascular:      Rate and Rhythm: Regular rhythm. Tachycardia present.      Heart sounds: No murmur heard.  Pulmonary:      Comments: Vent sounds reaching lung bases bilaterally  Abdominal:      General: There is no distension.      Palpations: Abdomen is soft.      Tenderness: There is no guarding.   Musculoskeletal:      Cervical back: Neck supple.      Right lower leg: No edema.      Left lower leg: No edema.   Skin:     General: Skin is warm and dry.      Capillary Refill: Capillary refill takes less than 2 seconds.                Significant Labs: All pertinent labs within the past 24 hours have been reviewed.  A1C:   Recent Labs   Lab 12/06/24  1652   HGBA1C 8.1*     ABGs:   Recent Labs   Lab 12/06/24  0952   PH 7.202*   PCO2 40.1   HCO3 15.7*   POCSATURATED 99.4   PO2 128*     Bilirubin:   Recent Labs   Lab 12/06/24  0900   BILITOT 1.4*     Blood Culture: No results for input(s): "LABBLOO" in the last 48 hours.  BMP:   Recent Labs   Lab 12/06/24  0900   *      K 4.3      CO2 12*   BUN 18   CREATININE 2.0*   CALCIUM 9.4     CBC:   Recent Labs   Lab 12/06/24  0900   WBC 20.01*   HGB 16.3   HCT 46.9        CMP:   Recent Labs   Lab 12/06/24  0900      K 4.3      CO2 12*   *   BUN 18   CREATININE 2.0*   CALCIUM 9.4   PROT 8.5*   ALBUMIN 3.8   BILITOT 1.4*   ALKPHOS 103   AST 31   ALT 37   ANIONGAP 25*     Recent Labs   Lab 12/06/24  0900   INR 2.7*   APTT 39.0*     Lactic Acid:   Recent Labs   Lab 12/06/24  1023 12/06/24  1652 12/06/24  2038   LACTATE 9.2* 2.5* 1.9       Recent Labs   Lab 12/06/24  1247 12/06/24  1622   POCTGLUCOSE 295* 260*      Recent Labs   Lab 12/06/24  1025   GSRESP <10 epithelial cells per low power field.  Rare WBC's  Rare Gram positive cocci     Troponin:   Recent Labs   Lab 12/06/24  0900   TROPONINIHS 15.6     Recent Labs   Lab 12/06/24  2014   COLORU Orange*   APPEARANCEUA Cloudy*   PHUR " 5.0   SPECGRAV >=1.030*   PROTEINUA Trace*   GLUCUA 4+*   KETONESU Trace*   BILIRUBINUA Negative   OCCULTUA 3+*   NITRITE Negative   UROBILINOGEN Negative   LEUKOCYTESUR Negative   RBCUA 31*   WBCUA 3   BACTERIA Negative   SQUAMEPITHEL 1   HYALINECASTS 1.5*     X-Ray Chest 1 View  Narrative: EXAMINATION:  XR CHEST 1 VIEW    CLINICAL HISTORY:  Other symptoms and signs involving cognitive functions and awareness    TECHNIQUE:  portable chest x-ray    COMPARISON:  11/17/2023    FINDINGS:  ET tube and NG tube are in place.  There is left basilar infiltrate.  Impression: ET tube and NG tube in place with left basilar infiltrate    Electronically signed by: Nury Vargas MD  Date:    12/06/2024  Time:    11:20  X-Ray Chest 1 View  Narrative: EXAMINATION:  XR CHEST 1 VIEW    CLINICAL HISTORY:  Presence of other specified devices    TECHNIQUE:  portable chest x-ray    COMPARISON:  12/06/2024 at 09:36.  <Comparisons>    FINDINGS:  There is ET tube and NG tube in place.  There is left basilar infiltrate.  Impression: ET tube and NG tube in place with sternotomy wires with left basilar infiltrate.    Electronically signed by: Nury Vargas MD  Date:    12/06/2024  Time:    11:12  CT Head Without Contrast  Narrative: EXAMINATION:  CT HEAD WITHOUT CONTRAST    CLINICAL HISTORY:  Mental status change, unknown cause;    CT/nuclear cardiac exams in previous 12 months: None    TECHNIQUE:  Axial CT images were obtained. Iterative reconstruction technique was used.    COMPARISON:  None    FINDINGS:  No intracranial hemorrhage, mass, mass effect or recent infarct evident.  Old lacunar infarct suspected of the left basal ganglia.  Small remote infarcts also questioned of the left cerebellar hemisphere and right frontal lobe.  Ventricles are not enlarged.  Visualized paranasal sinuses and mastoid air cells are clear.  Calvarium is intact.  Impression: No evidence of an acute intracranial abnormality.    Suspected small remote infarcts as  "above.    Electronically signed by: Antonio Joiner MD  Date:    12/06/2024  Time:    10:01     Significant Imaging: I have reviewed all pertinent imaging results/findings within the past 24 hours.  Assessment/Plan:     * Sepsis with encephalopathy without septic shock  This patient does have evidence of infective focus  My overall impression is septic shock due to lactate > 4.  Source: Respiratory and Urinary Tract  Antibiotics given-   Antibiotics (72h ago, onward)      Start     Stop Route Frequency Ordered    12/06/24 2115  ceFEPIme injection 2 g         -- IV Every 8 hours (non-standard times) 12/06/24 2012 12/06/24 2112  vancomycin - pharmacy to dose  (vancomycin IVPB (PEDS and ADULTS))        Placed in "And" Linked Group    -- IV pharmacy to manage frequency 12/06/24 2013 12/06/24 1300  mupirocin 2 % ointment         12/11/24 0859 Nasl 2 times daily 12/06/24 1149          Latest lactate reviewed- downtrending 9.2-->2.5 --> 1.9  Recent Labs   Lab 12/06/24 2038   LACTATE 1.9     Organ dysfunction indicated by Acute kidney injury, Acute respiratory failure, and Encephalopathy    Fluid challenge Ideal Body Weight- The patient's ideal body weight is Ideal body weight: 82.2 kg (181 lb 3.5 oz) which will be used to calculate fluid bolus of 30 ml/kg for treatment of septic shock.      Post- resuscitation assessment Yes Perfusion exam was performed within 6 hours of septic shock presentation after bolus shows Adequate tissue perfusion assessed by non-invasive monitoring       Will Not start Pressors- Levophed for MAP of 65  Source control achieved by: IVF, IV antibiotics    Atrial fibrillation with RVR  Patient has paroxysmal (<7 days) atrial fibrillation. Patient is currently in sinus rhythm. [id number] Score: The patient doesn't have any registry metric data available. The patients heart rate in the last 24 hours is as follows:  Pulse  Min: 78  Max: 130     Antiarrhythmics  diltiaZEM 125 mg in D5W 125 mL infusion, " Continuous, Intravenous    Anticoagulants   Home warfarin    Plan  - Replete lytes with a goal of K>4, Mg >2  - Patient is anticoagulated, see medications listed above.  - Patient's afib is currently controlled  - continue dilatiazem gtt per cardiology recommendations      Endotracheal tube present  Management, tube care, oral care, aspiration precautions.       Hyperglycemia  Per patient's family no known follow up for hyperglycemia.   Lab Results   Component Value Date    HGBA1C 8.1 (H) 12/06/2024    HGBA1C 6.4 (H) 11/16/2023      Lab Results   Component Value Date     (HH) 12/06/2024     (H) 04/02/2024    GLU 95 03/28/2024      POC glucose QID and SSI low dose      Encephalopathy, metabolic  Associated lactic acidosis. Multifactorial infectious process, uncontrolled diabetes, acute hypoxia.   Continue with above sepsis management.      New onset seizure  Witnessed seizure enroute to ER via EMS.   In the ER patient started on keppra 1500 mg x1.   CT head with no acute intracranial findings, old lacunar infarct suspected of the left basal ganglia. Small remote infarcts also questioned of the left cerebellar hemisphere and right frontal lobe  Likely sources infectious, metabolix  - seizure precautions         Hyperlipidemia  Resume home regimen      LEEANN (acute kidney injury)  LEEANN is likely due to pre-renal azotemia due to dehydration. Baseline creatinine is  1 . Most recent creatinine and eGFR are listed below.  Recent Labs     12/06/24  0900   CREATININE 2.0*   EGFRNORACEVR 37.0*      Plan  - LEEANN is worsening. Will continue current treatment  - Avoid nephrotoxins and renally dose meds for GFR listed above  - Monitor urine output, serial BMP, and adjust therapy as needed  - continue IVF /hr  - f/u urine culture and sensitivity      VTE Risk Mitigation (From admission, onward)           Ordered     Place AINSLEY hose  Until discontinued         12/06/24 1946     IP VTE HIGH RISK PATIENT  Once          12/06/24 1147     Place sequential compression device  Until discontinued         12/06/24 1147                               Pharmacokinetic Initial Assessment: IV Vancomycin    Assessment/Plan:    Initiate intravenous vancomycin with loading dose of 2000 mg once with subsequent doses when random concentrations are less than 20 mcg/mL  Desired empiric serum trough concentration is 15 to 20 mcg/mL  Draw vancomycin random level on 12/7 at 06:00.  Pharmacy will continue to follow and monitor vancomycin.      Please contact pharmacy with any questions regarding this assessment.     Thank you for the consult,   Bill Mead       Patient brief summary:  Chapo Rosario is a 64 y.o. male initiated on antimicrobial therapy with IV Vancomycin for treatment of suspected sepsis    Drug Allergies:   Review of patient's allergies indicates:  No Known Allergies    Actual Body Weight:   133.8kg    Renal Function:   Estimated Creatinine Clearance: 55.7 mL/min (A) (based on SCr of 2 mg/dL (H)).,     Dialysis Method (if applicable):  N/A    CBC (last 72 hours):  Recent Labs   Lab Result Units 12/06/24  0900 12/06/24  1652   WBC K/uL 20.01*  --    Hemoglobin g/dL 16.3  --    Hemoglobin A1C %  --  8.1*   Hematocrit % 46.9  --    Platelets K/uL 193  --    Gran % % 64.8  --    Lymph % % 23.5  --    Mono % % 9.7  --    Eosinophil % % 0.1  --    Basophil % % 0.5  --    Differential Method  Automated  --        Metabolic Panel (last 72 hours):  Recent Labs   Lab Result Units 12/06/24  0900 12/06/24  1044 12/06/24 2014   Sodium mmol/L 139  --   --    Potassium mmol/L 4.3  --   --    Chloride mmol/L 102  --   --    CO2 mmol/L 12*  --   --    Glucose mg/dL 474*  --   --    Glucose, UA   --  4+* 4+*   BUN mg/dL 18  --   --    Creatinine mg/dL 2.0*  --   --    Creatinine, Urine mg/dL  --  44.2  --    Albumin g/dL 3.8  --   --    Total Bilirubin mg/dL 1.4*  --   --    Alkaline Phosphatase U/L 103  --   --    AST U/L 31  --   --    ALT U/L  "37  --   --        Drug levels (last 3 results):  No results for input(s): "VANCOMYCINRA", "VANCORANDOM", "VANCOMYCINPE", "VANCOPEAK", "VANCOMYCINTR", "VANCOTROUGH" in the last 72 hours.    Microbiologic Results:  Microbiology Results (last 7 days)       Procedure Component Value Units Date/Time    Culture, Respiratory with Gram Stain [[id number]] Collected: 12/06/24 1025    Order Status: Completed Specimen: Sputum from Endotracheal Aspirate Updated: 12/06/24 2127     Gram Stain (Respiratory) <10 epithelial cells per low power field.     Gram Stain (Respiratory) Rare WBC's     Gram Stain (Respiratory) Rare Gram positive cocci    Blood culture #1 **CANNOT BE ORDERED STAT** [[id number]] Collected: 12/06/24 1023    Order Status: Sent Specimen: Blood from Peripheral, Antecubital, Right Updated: 12/06/24 1639    Blood culture #2 **CANNOT BE ORDERED STAT** [[id number]] Collected: 12/06/24 1033    Order Status: Sent Specimen: Blood from Peripheral, Forearm, Left Updated: 12/06/24 1639              Joon Romero DO  Department of Hospital Medicine  [facility] - [facility]          "

## 2024-12-07 NOTE — CARE UPDATE
12/07/24 1104   Patient Assessment/Suction   Level of Consciousness (AVPU) alert   Respiratory Effort Unlabored   Expansion/Accessory Muscles/Retractions no use of accessory muscles   PRE-TX-O2   Device (Oxygen Therapy) venturi mask   $ Is the patient on Low Flow Oxygen? Yes   Flow (L/min) (Oxygen Therapy) 15   Oxygen Concentration (%) 50   SpO2 100 %   Pulse (!) 128   Resp 17   Equipment Change   $ RT Equipment Venti Mask   Ready to Wean/Extubation Screen   FIO2<=50 (chart decimal) 0.5   Ready to Wean Parameters   $ Extubation Tech Time Extubation w/ Parameters   Extubated? Yes   Reason for Extubation Extubated  (Pt extubated to 50% ventimask as per order of Dr. Romero.)   Ventilator Discontinued Yes

## 2024-12-07 NOTE — ASSESSMENT & PLAN NOTE
Witnessed seizure enroute to ER via EMS.   In the ER patient started on keppra 1500 mg x1.   CT head with no acute intracranial findings, old lacunar infarct suspected of the left basal ganglia. Small remote infarcts also questioned of the left cerebellar hemisphere and right frontal lobe  Likely sources infectious, metabolix  - seizure precautions

## 2024-12-07 NOTE — ASSESSMENT & PLAN NOTE
LEEANN is likely due to pre-renal azotemia due to dehydration. Baseline creatinine is  1 . Most recent creatinine and eGFR are listed below.  Recent Labs     12/06/24  0900 12/07/24  0357   CREATININE 2.0* 1.7*   EGFRNORACEVR 37.0* 45.0*        Plan  - LEEANN is improving  - Avoid nephrotoxins and renally dose meds for GFR listed above  - Monitor urine output, serial BMP, and adjust therapy as needed  - continue IVF /hr  - f/u renal ultrasound

## 2024-12-07 NOTE — ASSESSMENT & PLAN NOTE
Per patient's family no known follow up for hyperglycemia.   Lab Results   Component Value Date    HGBA1C 8.1 (H) 12/06/2024    HGBA1C 6.4 (H) 11/16/2023      Lab Results   Component Value Date     (HH) 12/06/2024     (H) 04/02/2024    GLU 95 03/28/2024      POC glucose QID and SSI low dose

## 2024-12-07 NOTE — ASSESSMENT & PLAN NOTE
Witnessed seizure enroute to ER via EMS.   In the ER patient started on keppra 1500 mg x1.   CT head with no acute intracranial findings, old lacunar infarct suspected of the left basal ganglia. Small remote infarcts also questioned of the left cerebellar hemisphere and right frontal lobe  Likely sources infectious, metabolic  - seizure precautions

## 2024-12-07 NOTE — CARE UPDATE
12/07/24 0749   PRE-TX-O2   Device (Oxygen Therapy) ventilator   Oxygen Concentration (%) 50   SpO2 100 %   Pulse 93   Resp (!) 22   Vent Select   Charged w/in last 24h YES   Preset Conventional Ventilator Settings   Ventilation Type VC   Vent Mode (S)  Spont   Set Rate 14 BPM   Vt Set 600 mL   PEEP/CPAP 5 cmH20   Pressure Support (S)  14 cmH20   Peak Flow 60 L/min   Plateau Set/Insp. Hold (sec) 0   Insp Rise Time  70 %   I-Trigger Type  V-TRIG   Trigger Sensitivity Flow/I-Trigger 3 L/min   Patient Ventilator Parameters   Resp Rate Total 18 br/min   Peak Airway Pressure 19 cmH20   Mean Airway Pressure 11 cmH20   Plateau Pressure 18 cmH20   Exhaled Vt 619 mL   Total Ve 13.2 L/m   Spont Ve 13.2 L   I:E Ratio Measured 1:1.40   Auto PEEP 0 cmH20   Inspired Tidal Volume (VTI) 703 mL   Conventional Ventilator Alarms   Ve High Alarm 24 L/min   Ve Low Alarm 4 L/min   Resp Rate High Alarm 40 br/min   Press High Alarm 40 cmH2O   Apnea Rate 16   Apnea Volume (mL) 600 mL   Apnea Oxygen Concentration  100   Apnea Flow Rate (L/min) 60   T Apnea 10 sec(s)   Ready to Wean/Extubation Screen   FIO2<=50 (chart decimal) 0.5   MV<16L (chart vol.) 13.2   PEEP <=8 (chart #) 5   Ready to Wean Parameters   F/VT Ratio<105 (RSBI) (!) 35.54   Negative Inspiratory Force   Negative Inspiratory Force (cm H2O) 0   Vital Capacity   Vital Capacity (mL) 0

## 2024-12-07 NOTE — SUBJECTIVE & OBJECTIVE
Past Medical History:   Diagnosis Date    LEEANN (acute kidney injury) 11/13/2023    Anticoagulant long-term use     Aortic aneurysm     Atrial fibrillation     COVID-19 vaccine administered     Group D streptococcal infection     Hyperlipidemia     Hypertension     Pneumonia, unspecified organism     Severe sepsis 08/30/2018    Sleep apnea     Thrombocytopenia 11/13/2023    VHD (valvular heart disease)        Past Surgical History:   Procedure Laterality Date    ABLATION N/A 4/1/2024    Procedure: Afib Ablation;  Surgeon: Hung Stacy MD;  Location: [facility] CATH;  Service: Cardiology;  Laterality: N/A;  ops # 5    aortic valve replacement      APPENDECTOMY      CARDIAC SURGERY  2013    MECHANICAL AVR    CHOLECYSTECTOMY      COLONOSCOPY N/A 2/14/2024    Procedure: COLONOSCOPY;  Surgeon: Ta Flowers MD;  Location: [facility] ENDO;  Service: Endoscopy;  Laterality: N/A;    CORONARY ANGIOGRAPHY      ECHOCARDIOGRAM, TRANSESOPHAGEAL      ESOPHAGOGASTRODUODENOSCOPY N/A 2/14/2024    Procedure: EGD (ESOPHAGOGASTRODUODENOSCOPY);  Surgeon: Ta Flowers MD;  Location: [facility] ENDO;  Service: Endoscopy;  Laterality: N/A;    NECK SURGERY      TRANSESOPHAGEAL ECHOCARDIOGRAPHY N/A 11/17/2023    Procedure: ECHOCARDIOGRAM, TRANSESOPHAGEAL;  Surgeon: Antonio Ortiz MD;  Location: [facility] ENDO;  Service: Cardiovascular;  Laterality: N/A;    TREATMENT OF CARDIAC ARRHYTHMIA N/A 11/17/2023    Procedure: Cardioversion or Defibrillation;  Surgeon: Antonio Ortiz MD;  Location: [facility] ENDO;  Service: Cardiovascular;  Laterality: N/A;       Review of patient's allergies indicates:  No Known Allergies    Current Facility-Administered Medications on File Prior to Encounter   Medication    0.9%  NaCl infusion     Current Outpatient Medications on File Prior to Encounter   Medication Sig    amiodarone (PACERONE) 200 MG Tab Take 0.5 tablets (100 mg total) by mouth once daily. (Patient taking differently: Take 100 mg by mouth every  morning.)    aspirin (ECOTRIN) 81 MG EC tablet Take 81 mg by mouth once daily.    cholecalciferol, vitamin D3, (VITAMIN D3) 125 mcg (5,000 unit) Tab Take 5,000 Units by mouth once daily.    fenofibrate micronized (LOFIBRA) 134 MG Cap Take 134 mg by mouth daily with breakfast.    furosemide (LASIX) 40 MG tablet If increased SOB or weight gain greater or equal to 3lbs in 24 hours increase lasix to 40mg daily x 3 days then resume 20mg daily (Patient taking differently: Take 20 mg by mouth once daily.)    magnesium oxide (MAG-OX) 400 mg (241.3 mg magnesium) tablet Take 1 tablet (400 mg total) by mouth 2 (two) times daily.    metoprolol succinate (TOPROL-XL) 25 MG 24 hr tablet Take 2 tablets (50 mg total) by mouth 2 (two) times daily.    potassium chloride SA (K-DUR,KLOR-CON) 20 MEQ tablet Take 2 tablets (40 mEq total) by mouth once daily. (Patient taking differently: Take 20 mEq by mouth 2 (two) times daily.)    spironolactone (ALDACTONE) 25 MG tablet Take 1 tablet (25 mg total) by mouth 2 (two) times daily.    warfarin (COUMADIN) 4 MG tablet Take 1 tablet (4 mg total) by mouth Daily. Take 4 mg orally once daily except 6 mg orally on Mondays only or as directed by [facility]. (Patient taking differently: Take 4 mg by mouth Daily. Take 6 mg Daily except take 4 mg Saturday and Sunday)    famotidine (PEPCID) 20 MG tablet Take 1 tablet (20 mg total) by mouth 2 (two) times daily.    pantoprazole (PROTONIX) 40 MG tablet Take 1 tablet twice daily 30 min before meals for 2 weeks then Once daily before breakfast for 4 weeks    senna-docusate 8.6-50 mg (PERICOLACE) 8.6-50 mg per tablet Take 2 tablets by mouth 2 (two) times daily as needed for Constipation.    tadalafiL (CIALIS) 5 MG tablet Take 5 mg by mouth daily as needed for Erectile Dysfunction.     Family History       Problem Relation (Age of Onset)    Cancer Brother    Heart disease Father    No Known Problems Mother          Tobacco Use    Smoking status: Former     Passive  exposure: Never    Smokeless tobacco: Not on file   Substance and Sexual Activity    Alcohol use: Never    Drug use: Never    Sexual activity: Yes     Review of Systems   Unable to perform ROS: Intubated     Objective:     Vital Signs (Most Recent):  Temp: 99 °F (37.2 °C) (12/07/24 0715)  Pulse: 108 (12/07/24 0900)  Resp: 18 (12/07/24 0900)  BP: 118/65 (12/07/24 0900)  SpO2: 100 % (12/07/24 0900) Vital Signs (24h Range):  Temp:  [97.8 °F (36.6 °C)-99.7 °F (37.6 °C)] 99 °F (37.2 °C)  Pulse:  [] 108  Resp:  [15-31] 18  SpO2:  [95 %-100 %] 100 %  BP: ()/(51-84) 118/65     Weight: 133.8 kg (295 lb)  Body mass index is 37.88 kg/m².     Physical Exam  Vitals and nursing note reviewed. Exam conducted with a chaperone present.   Constitutional:       General: He is not in acute distress.     Appearance: He is ill-appearing.   HENT:      Head: Normocephalic.      Mouth/Throat:      Comments: ET tube in place, OG tube in place  Cardiovascular:      Rate and Rhythm: Regular rhythm. Tachycardia present.      Heart sounds: No murmur heard.  Pulmonary:      Comments: Vent sounds reaching lung bases bilaterally  Abdominal:      General: There is no distension.      Palpations: Abdomen is soft.   Musculoskeletal:      Cervical back: Neck supple.      Right lower leg: No edema.      Left lower leg: No edema.   Skin:     General: Skin is warm and dry.      Capillary Refill: Capillary refill takes less than 2 seconds.                Significant Labs: All pertinent labs within the past 24 hours have been reviewed.  A1C:   Recent Labs   Lab 12/06/24  1652   HGBA1C 8.1*     ABGs:   Recent Labs   Lab 12/06/24  0952 12/07/24  0541   PH 7.202* 7.415   PCO2 40.1 34.5*   HCO3 15.7* 22.9*   POCSATURATED 99.4 99.2   PO2 128* 114*     Bilirubin:   Recent Labs   Lab 12/06/24  0900 12/07/24  0357   BILITOT 1.4* 1.9*     Blood Culture:   Recent Labs   Lab 12/06/24  1023 12/06/24  1033   LABBLOO No Growth to date No Growth to date      BMP:   Recent Labs   Lab 12/07/24  0357   *      K 4.4      CO2 23   BUN 22   CREATININE 1.7*   CALCIUM 8.3*   MG 1.8     CBC:   Recent Labs   Lab 12/06/24  0900 12/07/24  0357   WBC 20.01* 7.91   HGB 16.3 13.8*   HCT 46.9 37.8*    82*     CMP:   Recent Labs   Lab 12/06/24  0900 12/07/24  0357    142   K 4.3 4.4    107   CO2 12* 23   * 328*   BUN 18 22   CREATININE 2.0* 1.7*   CALCIUM 9.4 8.3*   PROT 8.5* 6.6   ALBUMIN 3.8 2.9*   BILITOT 1.4* 1.9*   ALKPHOS 103 70   AST 31 31   ALT 37 25   ANIONGAP 25* 12*     Recent Labs   Lab 12/06/24  0900 12/07/24  0935   INR 2.7* 2.4*   APTT 39.0*  --      Lactic Acid:   Recent Labs   Lab 12/06/24  1023 12/06/24  1652 12/06/24  2038   LACTATE 9.2* 2.5* 1.9     Recent Labs   Lab 12/07/24  0357   MG 1.8     POCT Glucose:   Recent Labs   Lab 12/06/24  1622 12/06/24  2313 12/07/24  0820   POCTGLUCOSE 260* 364* 337*     Recent Labs   Lab 12/06/24  1025   GSRESP <10 epithelial cells per low power field.  Rare WBC's  Rare Gram positive cocci     Troponin:   Recent Labs   Lab 12/06/24  0900   TROPONINIHS 15.6     TSH:   Recent Labs   Lab 12/07/24  0935   TSH 0.413     Recent Labs   Lab 12/06/24 2014   COLORU Orange*   APPEARANCEUA Cloudy*   PHUR 5.0   SPECGRAV >=1.030*   PROTEINUA Trace*   GLUCUA 4+*   KETONESU Trace*   BILIRUBINUA Negative   OCCULTUA 3+*   NITRITE Negative   UROBILINOGEN Negative   LEUKOCYTESUR Negative   RBCUA 31*   WBCUA 3   BACTERIA Negative   SQUAMEPITHEL 1   HYALINECASTS 1.5*       Significant Imaging: I have reviewed all pertinent imaging results/findings within the past 24 hours.

## 2024-12-07 NOTE — ASSESSMENT & PLAN NOTE
Body mass index is 37.88 kg/m². Morbid obesity complicates all aspects of disease management from diagnostic modalities to treatment. Weight loss encouraged and health benefits explained to patient.

## 2024-12-07 NOTE — ASSESSMENT & PLAN NOTE
Associated lactic acidosis. Multifactorial infectious process, uncontrolled diabetes, acute hypoxia.   Continue with above sepsis management.

## 2024-12-07 NOTE — HOSPITAL COURSE
12/7/24 Tmax overnight 99F. ET tube in place on mechanical vent, sedated on proprofol. ABG 7.41/34.5/114/22.9, 99.2% FiO2 50%. Acidosis improving with gap closing, AM labs remarkable for elevated glucose >300. Leukocytosis resolved. Start glucose control with basal insulin, SSI. Renal function improving and tadeo catheter draining adequately, urine color clearing from milky pink. Continue IVF hydration. Keep broad abx coverage, f/u blood cx results. F/u renal, bladder sonogram for hematuria. Keep Sinus tachy, rate 108-120s bpm. Resume home amiodarone. Follow up cardiology consult. Will wean sedation and transition to CPAP trial for extubation.   12/8/24 Alert and awake, tolerating clear liquid diet. No witnessed seizure overnight. Breathing more comfortably on continuous O2 2L via nasal cannula SpO2 >97%. Tachycardia 110-120s bpm remains after digoxin x1. Will adjust metoprolol and amiodarone and monitor. Preliminary respiratory gram stain Staph aureus, follow up susceptibility studies. Continue with left PNA with cefepime and vancomycin. Adjust basal insulin dosing. Follow up renal ultrasound.   12/9/24:  Patient improving, awake and alert,  Remains tachycardic with atrial fibrillation, cardiology consult in place.  Blood cultures negative to date, sputum culture positive for staph aureus and susceptibility pending.  Continue cefepime/vancomycin for now.  Glucose trends >200 on average but improving, continue plan of care for now.  Pending renal ultrasound today.  Electrolytes stable, Corrected calcium at 9.0.  Implement PT/OT today.   12/10/2024:  Patient clinically much improved.  Patient required IV bolus dosing of Cardizem yesterday for AFib RVR with rates of 130.  He had a great response.  Cardiology had seen patient on yesterday and recommends continuing current amiodarone and Toprol dosing at the time of discharge.  We will continue with Bactrim and clindamycin for coverage of staph pneumonia at the time of  discharge.  We will need close follow up with Cardiology for INR monitoring with dual antibiotic therapy.

## 2024-12-07 NOTE — EICU
Intervention Initiated From:  [facility] / EICU    Willian intervened regarding:  Rounding (Video assessment)    Comments: pt is sedated on vent. He is tolerating it well. Propofol drip and ivf of LR is infusing. VSS. NAD observed.

## 2024-12-07 NOTE — EICU
Intervention Initiated From:  [facility] / EICU    Willian intervened regarding:  Rounding (Video assessment)    Doctor Notified:  Yes at 19:03    Comments: pt is sedated on vent and tolerating well. VSS. Cardizem and Propofol drips are infusing.

## 2024-12-07 NOTE — ASSESSMENT & PLAN NOTE
Patient has paroxysmal (<7 days) atrial fibrillation. Patient is currently in sinus rhythm. [id number] Score: The patient doesn't have any registry metric data available. The patients heart rate in the last 24 hours is as follows:  Pulse  Min: 78  Max: 130     Antiarrhythmics  diltiaZEM 125 mg in D5W 125 mL infusion, Continuous, Intravenous    Anticoagulants   Home warfarin    Plan  - Replete lytes with a goal of K>4, Mg >2  - Patient is anticoagulated, see medications listed above.  - Patient's afib is currently controlled  - continue dilatiazem gtt per cardiology recommendations

## 2024-12-07 NOTE — PLAN OF CARE
Pt awake and alert off of vent - extubated this am to 50 percent ventimask - now on 2 liters n.c. - sat 97 to 99 percent - no distress - non  productive cough on occasion - skin warm and dry - color normal - a fib with cvr - amiodarone and metoprolol - improving heart rate - pulses present - generalized edema - abdomen soft non tender bs x 4 - no bm - tadeo catheter draining cloudy yellow urine via gravity - secured in place - iv with lr at 125 ml per hour - saline lock x 2 - no complaints of pain or distress - tolerating ice and jello - mother in room with pt - will continue to monitor

## 2024-12-07 NOTE — PROGRESS NOTES
"[facility] Medicine  Progress Note    Patient Name: Chapo Rosario  MRN: [id number]  Patient Class: IP- Inpatient   Admission Date: 12/6/2024  Length of Stay: 1 days  Attending Physician: Parveen Salazar Jr., MD  Primary Care Provider: Flynn Delarosa MD        Subjective     Principal Problem:Sepsis with encephalopathy without septic shock    HPI:  64 year old with hypertension, hyperlipidemia, obstructive sleep apnea on CPAP, history of bicuspid aortic valve and dilated aortic root s/p mechanical AVR on coumadin was brought in to ER via EMS with following reports;   EMS reports, "Unresponsive with pinpoint pupils upon arrival. Fire depart gave 2 mg of Narcan, no improvement. Last seen normal last night at 2300. 22 gauge left thumb. Narcan 2 mg given IV, started responding to voice. Uncooperative and flailing. History of sepsis. Had seizure en route, became apneic, started bagging. ."  ED course: On arrival vital signs /65 mmHg, , RR 26, SpO2 96% on room air, temp 99.7F.   Patient intubated in ER; ABG following 7.2/40.1/128/15.7 SpO2 99% FiO2 100% on vent. Patient loaded with keppra.  Sepsis work up; WBC 20K, Hg 16.3, HCT 46.9,   Serum Na  139, K 4.3, Cl 102, CO2 12, Glu 474, BUN 18, Cr. 2( baseline 1), AG 25,    Alb 3.8, Tbili 1.4, AST 31, ALT 37, Alk phos 103  Lactate 9.2 à 2.5   Cardiac workup troponin I hs 15.6, NT proBNP 1357, EKG tracings atrial fibrillation, rate 147, non-specific ST-T changes.  Head CT with no acute intracranial findings. CXR with left basilar infiltrates. Urinalysis significant of glucosuria.    Patient received NS bolus x 2.5L followed by continuous /hr. Started on diltiazem gtt. Rocephin 1g.   Patient admitted to ICU with continued management of sepsis, atrial fibrillation with RVR, mechanical ventilation.   Additional history from family: He was last seen out of bed at 7:30AM by his fiance who reports patient went back to bed. " The night before patient's mother accounts after dinner, taking a shower and going to bed around 11 PM. Overnight patient woke up in confusion and urinated in the hamper. Patient had complained of some weakness starting Monday early in the week. EMS was called after patient was shaking in bed and unreponsive at 8AM.   Telemetry tracings normal sinus rhythym rate 108, /62 mmHg, ET tube in place connected to vent AC Vt 600, RR 14, PEEP5, FiO2 70%, SpO2 100%.  Tadeo catheter draining purulent urine, cloudy and pink.  Lactate levels down trending.  Resent urine for urinalysis, continue with broad spectrum abx, continue with IVF /hr.    Overview/Hospital Course:  12/7/24 Tmax overnight 99F. ET tube in place on mechanical vent, sedated on proprofol. ABG 7.41/34.5/114/22.9, 99.2% FiO2 50%. Acidosis improving with gap closing, AM labs remarkable for elevated glucose >300. Leukocytosis resolved. Start glucose control with basal insulin, SSI. Renal function improving and tadeo catheter draining adequately, urine color clearing from milky pink. Continue IVF hydration. Keep broad abx coverage, f/u blood cx results. F/u renal, bladder sonogram for hematuria. Keep Sinus tachy, rate 108-120s bpm. Resume home amiodarone. Follow up cardiology consult. Will wean sedation and transition to CPAP trial for extubation.     Past Medical History:   Diagnosis Date    LEEANN (acute kidney injury) 11/13/2023    Anticoagulant long-term use     Aortic aneurysm     Atrial fibrillation     COVID-19 vaccine administered     Group D streptococcal infection     Hyperlipidemia     Hypertension     Pneumonia, unspecified organism     Severe sepsis 08/30/2018    Sleep apnea     Thrombocytopenia 11/13/2023    VHD (valvular heart disease)        Past Surgical History:   Procedure Laterality Date    ABLATION N/A 4/1/2024    Procedure: Afib Ablation;  Surgeon: Hung Stacy MD;  Location: [facility] CATH;  Service: Cardiology;  Laterality: N/A;  ops  # 5    aortic valve replacement      APPENDECTOMY      CARDIAC SURGERY  2013    MECHANICAL AVR    CHOLECYSTECTOMY      COLONOSCOPY N/A 2/14/2024    Procedure: COLONOSCOPY;  Surgeon: Ta Flowers MD;  Location: [facility] ENDO;  Service: Endoscopy;  Laterality: N/A;    CORONARY ANGIOGRAPHY      ECHOCARDIOGRAM, TRANSESOPHAGEAL      ESOPHAGOGASTRODUODENOSCOPY N/A 2/14/2024    Procedure: EGD (ESOPHAGOGASTRODUODENOSCOPY);  Surgeon: Ta Flowers MD;  Location: [facility] ENDO;  Service: Endoscopy;  Laterality: N/A;    NECK SURGERY      TRANSESOPHAGEAL ECHOCARDIOGRAPHY N/A 11/17/2023    Procedure: ECHOCARDIOGRAM, TRANSESOPHAGEAL;  Surgeon: Antonio Ortiz MD;  Location: [facility] ENDO;  Service: Cardiovascular;  Laterality: N/A;    TREATMENT OF CARDIAC ARRHYTHMIA N/A 11/17/2023    Procedure: Cardioversion or Defibrillation;  Surgeon: Antonio Ortiz MD;  Location: [facility] ENDO;  Service: Cardiovascular;  Laterality: N/A;       Review of patient's allergies indicates:  No Known Allergies    Current Facility-Administered Medications on File Prior to Encounter   Medication    0.9%  NaCl infusion     Current Outpatient Medications on File Prior to Encounter   Medication Sig    amiodarone (PACERONE) 200 MG Tab Take 0.5 tablets (100 mg total) by mouth once daily. (Patient taking differently: Take 100 mg by mouth every morning.)    aspirin (ECOTRIN) 81 MG EC tablet Take 81 mg by mouth once daily.    cholecalciferol, vitamin D3, (VITAMIN D3) 125 mcg (5,000 unit) Tab Take 5,000 Units by mouth once daily.    fenofibrate micronized (LOFIBRA) 134 MG Cap Take 134 mg by mouth daily with breakfast.    furosemide (LASIX) 40 MG tablet If increased SOB or weight gain greater or equal to 3lbs in 24 hours increase lasix to 40mg daily x 3 days then resume 20mg daily (Patient taking differently: Take 20 mg by mouth once daily.)    magnesium oxide (MAG-OX) 400 mg (241.3 mg magnesium) tablet Take 1 tablet (400 mg total) by mouth 2 (two) times  daily.    metoprolol succinate (TOPROL-XL) 25 MG 24 hr tablet Take 2 tablets (50 mg total) by mouth 2 (two) times daily.    potassium chloride SA (K-DUR,KLOR-CON) 20 MEQ tablet Take 2 tablets (40 mEq total) by mouth once daily. (Patient taking differently: Take 20 mEq by mouth 2 (two) times daily.)    spironolactone (ALDACTONE) 25 MG tablet Take 1 tablet (25 mg total) by mouth 2 (two) times daily.    warfarin (COUMADIN) 4 MG tablet Take 1 tablet (4 mg total) by mouth Daily. Take 4 mg orally once daily except 6 mg orally on Mondays only or as directed by [facility]. (Patient taking differently: Take 4 mg by mouth Daily. Take 6 mg Daily except take 4 mg Saturday and Sunday)    famotidine (PEPCID) 20 MG tablet Take 1 tablet (20 mg total) by mouth 2 (two) times daily.    pantoprazole (PROTONIX) 40 MG tablet Take 1 tablet twice daily 30 min before meals for 2 weeks then Once daily before breakfast for 4 weeks    senna-docusate 8.6-50 mg (PERICOLACE) 8.6-50 mg per tablet Take 2 tablets by mouth 2 (two) times daily as needed for Constipation.    tadalafiL (CIALIS) 5 MG tablet Take 5 mg by mouth daily as needed for Erectile Dysfunction.     Family History       Problem Relation (Age of Onset)    Cancer Brother    Heart disease Father    No Known Problems Mother          Tobacco Use    Smoking status: Former     Passive exposure: Never    Smokeless tobacco: Not on file   Substance and Sexual Activity    Alcohol use: Never    Drug use: Never    Sexual activity: Yes     Review of Systems   Unable to perform ROS: Intubated     Objective:     Vital Signs (Most Recent):  Temp: 99 °F (37.2 °C) (12/07/24 0715)  Pulse: 108 (12/07/24 0900)  Resp: 18 (12/07/24 0900)  BP: 118/65 (12/07/24 0900)  SpO2: 100 % (12/07/24 0900) Vital Signs (24h Range):  Temp:  [97.8 °F (36.6 °C)-99.7 °F (37.6 °C)] 99 °F (37.2 °C)  Pulse:  [] 108  Resp:  [15-31] 18  SpO2:  [95 %-100 %] 100 %  BP: ()/(51-84) 118/65     Weight: 133.8 kg (295 lb)  Body  mass index is 37.88 kg/m².     Physical Exam  Vitals and nursing note reviewed. Exam conducted with a chaperone present.   Constitutional:       General: He is not in acute distress.     Appearance: He is ill-appearing.   HENT:      Head: Normocephalic.      Mouth/Throat:      Comments: ET tube in place, OG tube in place  Cardiovascular:      Rate and Rhythm: Regular rhythm. Tachycardia present.      Heart sounds: No murmur heard.  Pulmonary:      Comments: Vent sounds reaching lung bases bilaterally  Abdominal:      General: There is no distension.      Palpations: Abdomen is soft.   Musculoskeletal:      Cervical back: Neck supple.      Right lower leg: No edema.      Left lower leg: No edema.   Skin:     General: Skin is warm and dry.      Capillary Refill: Capillary refill takes less than 2 seconds.                Significant Labs: All pertinent labs within the past 24 hours have been reviewed.  A1C:   Recent Labs   Lab 12/06/24  1652   HGBA1C 8.1*     ABGs:   Recent Labs   Lab 12/06/24  0952 12/07/24  0541   PH 7.202* 7.415   PCO2 40.1 34.5*   HCO3 15.7* 22.9*   POCSATURATED 99.4 99.2   PO2 128* 114*     Bilirubin:   Recent Labs   Lab 12/06/24  0900 12/07/24  0357   BILITOT 1.4* 1.9*     Blood Culture:   Recent Labs   Lab 12/06/24  1023 12/06/24  1033   LABBLOO No Growth to date No Growth to date     BMP:   Recent Labs   Lab 12/07/24  0357   *      K 4.4      CO2 23   BUN 22   CREATININE 1.7*   CALCIUM 8.3*   MG 1.8     CBC:   Recent Labs   Lab 12/06/24  0900 12/07/24  0357   WBC 20.01* 7.91   HGB 16.3 13.8*   HCT 46.9 37.8*    82*     CMP:   Recent Labs   Lab 12/06/24  0900 12/07/24  0357    142   K 4.3 4.4    107   CO2 12* 23   * 328*   BUN 18 22   CREATININE 2.0* 1.7*   CALCIUM 9.4 8.3*   PROT 8.5* 6.6   ALBUMIN 3.8 2.9*   BILITOT 1.4* 1.9*   ALKPHOS 103 70   AST 31 31   ALT 37 25   ANIONGAP 25* 12*     Recent Labs   Lab 12/06/24  0900 12/07/24  0935   INR 2.7*  "2.4*   APTT 39.0*  --      Lactic Acid:   Recent Labs   Lab 12/06/24  1023 12/06/24  1652 12/06/24  2038   LACTATE 9.2* 2.5* 1.9     Recent Labs   Lab 12/07/24  0357   MG 1.8     POCT Glucose:   Recent Labs   Lab 12/06/24  1622 12/06/24  2313 12/07/24  0820   POCTGLUCOSE 260* 364* 337*     Recent Labs   Lab 12/06/24  1025   GSRESP <10 epithelial cells per low power field.  Rare WBC's  Rare Gram positive cocci     Troponin:   Recent Labs   Lab 12/06/24  0900   TROPONINIHS 15.6     TSH:   Recent Labs   Lab 12/07/24  0935   TSH 0.413     Recent Labs   Lab 12/06/24 2014   COLORU Orange*   APPEARANCEUA Cloudy*   PHUR 5.0   SPECGRAV >=1.030*   PROTEINUA Trace*   GLUCUA 4+*   KETONESU Trace*   BILIRUBINUA Negative   OCCULTUA 3+*   NITRITE Negative   UROBILINOGEN Negative   LEUKOCYTESUR Negative   RBCUA 31*   WBCUA 3   BACTERIA Negative   SQUAMEPITHEL 1   HYALINECASTS 1.5*       Significant Imaging: I have reviewed all pertinent imaging results/findings within the past 24 hours.    Assessment and Plan     * Sepsis with encephalopathy without septic shock  This patient does have evidence of infective focus  My overall impression is septic shock due to lactate > 4.  Source: Respiratory and Urinary Tract  Antibiotics given-   Antibiotics (72h ago, onward)      Start     Stop Route Frequency Ordered    12/07/24 0830  vancomycin 1750 mg in 0.9% sodium chloride 500 mL IVPB         -- IV Every 24 hours (non-standard times) 12/07/24 0725    12/06/24 2115  ceFEPIme injection 2 g         -- IV Every 8 hours (non-standard times) 12/06/24 2012 12/06/24 2112  vancomycin - pharmacy to dose  (vancomycin IVPB (PEDS and ADULTS))        Placed in "And" Linked Group    -- IV pharmacy to manage frequency 12/06/24 2013 12/06/24 1300  mupirocin 2 % ointment         12/11/24 0859 Nasl 2 times daily 12/06/24 1149          Latest lactate reviewed- downtrending 9.2-->2.5 --> 1.9  Recent Labs   Lab 12/06/24  2038   LACTATE 1.9       Organ " dysfunction indicated by Acute kidney injury, Acute respiratory failure, and Encephalopathy    Fluid challenge Ideal Body Weight- The patient's ideal body weight is Ideal body weight: 82.2 kg (181 lb 3.5 oz) which will be used to calculate fluid bolus of 30 ml/kg for treatment of septic shock.      Post- resuscitation assessment Yes Perfusion exam was performed within 6 hours of septic shock presentation after bolus shows Adequate tissue perfusion assessed by non-invasive monitoring       Will Not start Pressors- Levophed for MAP of 65  Source control achieved by: IVF, IV antibiotics    Atrial fibrillation with RVR  Patient has paroxysmal (<7 days) atrial fibrillation. Patient is currently in sinus rhythm. [id number] Score: The patient doesn't have any registry metric data available. The patients heart rate in the last 24 hours is as follows:  Pulse  Min: 70  Max: 130     Antiarrhythmics  diltiaZEM 125 mg in D5W 125 mL infusion, Continuous, Intravenous  amiodarone tablet 100 mg, Every morning, Per NG tube    Anticoagulants   Home warfarin    Plan  - Replete lytes with a goal of K>4, Mg >2  - Patient is anticoagulated, see medications listed above.  - Patient's afib is currently controlled  - continue dilatiazem gtt per cardiology recommendations  - resume home amiodarone 100 mg daily      Endotracheal tube present  Management, tube care, oral care, aspiration precautions.   12/7/24 wean sedation, transition to AC to CPAP settings, prepare for extubation      Hyperglycemia  Per patient's family no known follow up for hyperglycemia.   Lab Results   Component Value Date    HGBA1C 8.1 (H) 12/06/2024    HGBA1C 6.4 (H) 11/16/2023      Lab Results   Component Value Date     (H) 12/07/2024     (HH) 12/06/2024     (H) 04/02/2024   Start basal insulin and monitor   POC glucose QID and SSI low dose      Encephalopathy, metabolic  Associated lactic acidosis. Multifactorial infectious process, uncontrolled  diabetes, acute hypoxia.   Continue with above sepsis management.      New onset seizure  Witnessed seizure enroute to ER via EMS.   In the ER patient started on keppra 1500 mg x1.   CT head with no acute intracranial findings, old lacunar infarct suspected of the left basal ganglia. Small remote infarcts also questioned of the left cerebellar hemisphere and right frontal lobe  Likely sources infectious, metabolic  - seizure precautions         Hyperlipidemia  Resume home regimen      Class 2 severe obesity with serious comorbidity and body mass index (BMI) of 37.0 to 37.9 in adult  Body mass index is 37.88 kg/m². Morbid obesity complicates all aspects of disease management from diagnostic modalities to treatment. Weight loss encouraged and health benefits explained to patient.         LEEANN (acute kidney injury)  LEEANN is likely due to pre-renal azotemia due to dehydration. Baseline creatinine is  1 . Most recent creatinine and eGFR are listed below.  Recent Labs     12/06/24  0900 12/07/24  0357   CREATININE 2.0* 1.7*   EGFRNORACEVR 37.0* 45.0*        Plan  - LEEANN is improving  - Avoid nephrotoxins and renally dose meds for GFR listed above  - Monitor urine output, serial BMP, and adjust therapy as needed  - continue IVF /hr  - f/u renal ultrasound      VTE Risk Mitigation (From admission, onward)           Ordered     Place AINSLEY hose  Until discontinued         12/06/24 1946     IP VTE HIGH RISK PATIENT  Once         12/06/24 1147     Place sequential compression device  Until discontinued         12/06/24 1147                    Discharge Planning   CATAL[place]:      Code Status: Full Code   Medical Readiness for Discharge Date:   Discharge Plan A: Other (TBD)                        Joon Romero DO  Department of Hospital Medicine   [place] - [facility]

## 2024-12-07 NOTE — ASSESSMENT & PLAN NOTE
"This patient does have evidence of infective focus  My overall impression is septic shock due to lactate > 4.  Source: Respiratory and Urinary Tract  Antibiotics given-   Antibiotics (72h ago, onward)      Start     Stop Route Frequency Ordered    12/06/24 2115  ceFEPIme injection 2 g         -- IV Every 8 hours (non-standard times) 12/06/24 2012 12/06/24 2112  vancomycin - pharmacy to dose  (vancomycin IVPB (PEDS and ADULTS))        Placed in "And" Linked Group    -- IV pharmacy to manage frequency 12/06/24 2013 12/06/24 1300  mupirocin 2 % ointment         12/11/24 0859 Nasl 2 times daily 12/06/24 1149          Latest lactate reviewed- downtrending 9.2-->2.5 --> 1.9  Recent Labs   Lab 12/06/24  2038   LACTATE 1.9     Organ dysfunction indicated by Acute kidney injury, Acute respiratory failure, and Encephalopathy    Fluid challenge Ideal Body Weight- The patient's ideal body weight is Ideal body weight: 82.2 kg (181 lb 3.5 oz) which will be used to calculate fluid bolus of 30 ml/kg for treatment of septic shock.      Post- resuscitation assessment Yes Perfusion exam was performed within 6 hours of septic shock presentation after bolus shows Adequate tissue perfusion assessed by non-invasive monitoring       Will Not start Pressors- Levophed for MAP of 65  Source control achieved by: IVF, IV antibiotics  "

## 2024-12-07 NOTE — HPI
"64 year old with hypertension, hyperlipidemia, obstructive sleep apnea on CPAP, history of bicuspid aortic valve and dilated aortic root s/p mechanical AVR on coumadin was brought in to ER via EMS with following reports;   EMS reports, "Unresponsive with pinpoint pupils upon arrival. Fire depart gave 2 mg of Narcan, no improvement. Last seen normal last night at 2300. 22 gauge left thumb. Narcan 2 mg given IV, started responding to voice. Uncooperative and flailing. History of sepsis. Had seizure en route, became apneic, started bagging. ."  ED course: On arrival vital signs /65 mmHg, , RR 26, SpO2 96% on room air, temp 99.7F.   Patient intubated in ER; ABG following 7.2/40.1/128/15.7 SpO2 99% FiO2 100% on vent. Patient loaded with keppra.  Sepsis work up; WBC 20K, Hg 16.3, HCT 46.9,   Serum Na  139, K 4.3, Cl 102, CO2 12, Glu 474, BUN 18, Cr. 2( baseline 1), AG 25,    Alb 3.8, Tbili 1.4, AST 31, ALT 37, Alk phos 103  Lactate 9.2 à 2.5   Cardiac workup troponin I hs 15.6, NT proBNP 1357, EKG tracings atrial fibrillation, rate 147, non-specific ST-T changes.  Head CT with no acute intracranial findings. CXR with left basilar infiltrates. Urinalysis significant of glucosuria.    Patient received NS bolus x 2.5L followed by continuous /hr. Started on diltiazem gtt. Rocephin 1g.   Patient admitted to ICU with continued management of sepsis, atrial fibrillation with RVR, mechanical ventilation.   Additional history from family: He was last seen out of bed at 7:30AM by his fiance who reports patient went back to bed. The night before patient's mother accounts after dinner, taking a shower and going to bed around 11 PM. Overnight patient woke up in confusion and urinated in the hamper. Patient had complained of some weakness starting Monday early in the week. EMS was called after patient was shaking in bed and unreponsive at 8AM.   Telemetry tracings normal sinus rhythym rate 108, /62 " mmHg, ET tube in place connected to vent AC Vt 600, RR 14, PEEP5, FiO2 70%, SpO2 100%.  Stevens catheter draining purulent urine, cloudy and pink.  Lactate levels down trending.  Resent urine for urinalysis, continue with broad spectrum abx, continue with IVF /hr.

## 2024-12-07 NOTE — ASSESSMENT & PLAN NOTE
Patient has paroxysmal (<7 days) atrial fibrillation. Patient is currently in sinus rhythm. [id number] Score: The patient doesn't have any registry metric data available. The patients heart rate in the last 24 hours is as follows:  Pulse  Min: 70  Max: 130     Antiarrhythmics  diltiaZEM 125 mg in D5W 125 mL infusion, Continuous, Intravenous  amiodarone tablet 100 mg, Every morning, Per NG tube    Anticoagulants   Home warfarin    Plan  - Replete lytes with a goal of K>4, Mg >2  - Patient is anticoagulated, see medications listed above.  - Patient's afib is currently controlled  - continue dilatiazem gtt per cardiology recommendations  - resume home amiodarone 100 mg daily

## 2024-12-07 NOTE — PROGRESS NOTES
"Pharmacokinetic Initial Assessment: IV Vancomycin    Assessment/Plan:    Initiate intravenous vancomycin with loading dose of 2000 mg once with subsequent doses when random concentrations are less than 20 mcg/mL  Desired empiric serum trough concentration is 15 to 20 mcg/mL  Draw vancomycin random level on 12/7 at 06:00.  Pharmacy will continue to follow and monitor vancomycin.      Please contact pharmacy with any questions regarding this assessment.     Thank you for the consult,   Bill Mead       Patient brief summary:  Chapo Rosario is a 64 y.o. male initiated on antimicrobial therapy with IV Vancomycin for treatment of suspected sepsis    Drug Allergies:   Review of patient's allergies indicates:  No Known Allergies    Actual Body Weight:   133.8kg    Renal Function:   Estimated Creatinine Clearance: 55.7 mL/min (A) (based on SCr of 2 mg/dL (H)).,     Dialysis Method (if applicable):  N/A    CBC (last 72 hours):  Recent Labs   Lab Result Units 12/06/24  0900 12/06/24  1652   WBC K/uL 20.01*  --    Hemoglobin g/dL 16.3  --    Hemoglobin A1C %  --  8.1*   Hematocrit % 46.9  --    Platelets K/uL 193  --    Gran % % 64.8  --    Lymph % % 23.5  --    Mono % % 9.7  --    Eosinophil % % 0.1  --    Basophil % % 0.5  --    Differential Method  Automated  --        Metabolic Panel (last 72 hours):  Recent Labs   Lab Result Units 12/06/24  0900 12/06/24  1044 12/06/24 2014   Sodium mmol/L 139  --   --    Potassium mmol/L 4.3  --   --    Chloride mmol/L 102  --   --    CO2 mmol/L 12*  --   --    Glucose mg/dL 474*  --   --    Glucose, UA   --  4+* 4+*   BUN mg/dL 18  --   --    Creatinine mg/dL 2.0*  --   --    Creatinine, Urine mg/dL  --  44.2  --    Albumin g/dL 3.8  --   --    Total Bilirubin mg/dL 1.4*  --   --    Alkaline Phosphatase U/L 103  --   --    AST U/L 31  --   --    ALT U/L 37  --   --        Drug levels (last 3 results):  No results for input(s): "VANCOMYCINRA", "VANCORANDOM", "VANCOMYCINPE", " ""VANCOPEAK", "VANCOMYCINTR", "VANCOTROUGH" in the last 72 hours.    Microbiologic Results:  Microbiology Results (last 7 days)       Procedure Component Value Units Date/Time    Culture, Respiratory with Gram Stain [[id number]] Collected: 12/06/24 1025    Order Status: Completed Specimen: Sputum from Endotracheal Aspirate Updated: 12/06/24 2127     Gram Stain (Respiratory) <10 epithelial cells per low power field.     Gram Stain (Respiratory) Rare WBC's     Gram Stain (Respiratory) Rare Gram positive cocci    Blood culture #1 **CANNOT BE ORDERED STAT** [[id number]] Collected: 12/06/24 1023    Order Status: Sent Specimen: Blood from Peripheral, Antecubital, Right Updated: 12/06/24 1639    Blood culture #2 **CANNOT BE ORDERED STAT** [[id number]] Collected: 12/06/24 1033    Order Status: Sent Specimen: Blood from Peripheral, Forearm, Left Updated: 12/06/24 1639            "

## 2024-12-07 NOTE — NURSING
Noted patient's heart rate increased to 114 bpm. Noticed patient felt warm , decreased his covers, heart rate is going down it is now 100 bpm.

## 2024-12-07 NOTE — ASSESSMENT & PLAN NOTE
Management, tube care, oral care, aspiration precautions.   12/7/24 wean sedation, transition to AC to CPAP settings, prepare for extubation

## 2024-12-07 NOTE — PLAN OF CARE
Problem: Mechanical Ventilation Invasive  Goal: Effective Communication  Outcome: Progressing  Goal: Optimal Device Function  Outcome: Progressing  Goal: Mechanical Ventilation Liberation  Outcome: Progressing  Goal: Optimal Nutrition Delivery  Outcome: Progressing  Goal: Absence of Device-Related Skin and Tissue Injury  Outcome: Progressing  Goal: Absence of Ventilator-Induced Lung Injury  Outcome: Progressing     Problem: Artificial Airway  Goal: Effective Communication  Outcome: Progressing  Goal: Optimal Device Function  Outcome: Progressing  Goal: Absence of Device-Related Skin or Tissue Injury  Outcome: Progressing     Problem: Infection  Goal: Absence of Infection Signs and Symptoms  Outcome: Progressing     Problem: Adult Inpatient Plan of Care  Goal: Plan of Care Review  Outcome: Progressing  Goal: Patient-Specific Goal (Individualized)  Outcome: Progressing  Goal: Absence of Hospital-Acquired Illness or Injury  Outcome: Progressing  Goal: Optimal Comfort and Wellbeing  Outcome: Progressing  Goal: Readiness for Transition of Care  Outcome: Progressing     Problem: Fall Injury Risk  Goal: Absence of Fall and Fall-Related Injury  Outcome: Progressing     Problem: Restraint, Nonviolent  Goal: Absence of Harm or Injury  Outcome: Progressing     Problem: Skin Injury Risk Increased  Goal: Skin Health and Integrity  Outcome: Progressing     Problem: Sepsis/Septic Shock  Goal: Optimal Coping  Outcome: Progressing  Goal: Absence of Bleeding  Outcome: Progressing  Goal: Blood Glucose Level Within Targeted Range  Outcome: Progressing  Goal: Absence of Infection Signs and Symptoms  Outcome: Progressing  Goal: Optimal Nutrition Intake  Outcome: Progressing      Patient's heart rate decreased weaned off the cardizem, also due to SBP in the 90's.  Afebrile, tolerated the ventilator with the sedation, restraints remained on.  Urine output is low but has cleared somewhat, there is less pink, and less sediment noted. Blood  work looks good this AM.

## 2024-12-07 NOTE — ASSESSMENT & PLAN NOTE
LEEANN is likely due to pre-renal azotemia due to dehydration. Baseline creatinine is  1 . Most recent creatinine and eGFR are listed below.  Recent Labs     12/06/24  0900   CREATININE 2.0*   EGFRNORACEVR 37.0*      Plan  - LEEANN is worsening. Will continue current treatment  - Avoid nephrotoxins and renally dose meds for GFR listed above  - Monitor urine output, serial BMP, and adjust therapy as needed  - continue IVF /hr  - f/u urine culture and sensitivity

## 2024-12-07 NOTE — ASSESSMENT & PLAN NOTE
"This patient does have evidence of infective focus  My overall impression is septic shock due to lactate > 4.  Source: Respiratory and Urinary Tract  Antibiotics given-   Antibiotics (72h ago, onward)    Start     Stop Route Frequency Ordered    12/07/24 0830  vancomycin 1750 mg in 0.9% sodium chloride 500 mL IVPB         -- IV Every 24 hours (non-standard times) 12/07/24 0725    12/06/24 2115  ceFEPIme injection 2 g         -- IV Every 8 hours (non-standard times) 12/06/24 2012 12/06/24 2112  vancomycin - pharmacy to dose  (vancomycin IVPB (PEDS and ADULTS))        Placed in "And" Linked Group    -- IV pharmacy to manage frequency 12/06/24 2013 12/06/24 1300  mupirocin 2 % ointment         12/11/24 0859 Nasl 2 times daily 12/06/24 1149        Latest lactate reviewed- downtrending 9.2-->2.5 --> 1.9  Recent Labs   Lab 12/06/24  2038   LACTATE 1.9       Organ dysfunction indicated by Acute kidney injury, Acute respiratory failure, and Encephalopathy    Fluid challenge Ideal Body Weight- The patient's ideal body weight is Ideal body weight: 82.2 kg (181 lb 3.5 oz) which will be used to calculate fluid bolus of 30 ml/kg for treatment of septic shock.      Post- resuscitation assessment Yes Perfusion exam was performed within 6 hours of septic shock presentation after bolus shows Adequate tissue perfusion assessed by non-invasive monitoring       Will Not start Pressors- Levophed for MAP of 65  Source control achieved by: IVF, IV antibiotics  "

## 2024-12-08 PROBLEM — J18.9 PNEUMONIA OF LEFT LOWER LOBE DUE TO INFECTIOUS ORGANISM: Status: ACTIVE | Noted: 2024-12-07

## 2024-12-08 LAB
ALBUMIN SERPL BCP-MCNC: 2.6 G/DL (ref 3.5–5.2)
ALP SERPL-CCNC: 70 U/L (ref 55–135)
ALT SERPL W/O P-5'-P-CCNC: 24 U/L (ref 10–44)
ANION GAP SERPL CALC-SCNC: 8 MMOL/L (ref 3–11)
AST SERPL-CCNC: 27 U/L (ref 10–40)
BASOPHILS # BLD AUTO: 0.02 K/UL (ref 0–0.2)
BASOPHILS NFR BLD: 0.3 % (ref 0–1.9)
BILIRUB SERPL-MCNC: 2.9 MG/DL (ref 0.1–1)
BUN SERPL-MCNC: 17 MG/DL (ref 8–23)
CALCIUM SERPL-MCNC: 8.2 MG/DL (ref 8.7–10.5)
CHLORIDE SERPL-SCNC: 109 MMOL/L (ref 95–110)
CO2 SERPL-SCNC: 26 MMOL/L (ref 23–29)
CREAT SERPL-MCNC: 1 MG/DL (ref 0.5–1.4)
DIFFERENTIAL METHOD BLD: ABNORMAL
EOSINOPHIL # BLD AUTO: 0 K/UL (ref 0–0.5)
EOSINOPHIL NFR BLD: 0.6 % (ref 0–8)
ERYTHROCYTE [DISTWIDTH] IN BLOOD BY AUTOMATED COUNT: 13.3 % (ref 11.5–14.5)
EST. GFR  (NO RACE VARIABLE): >60 ML/MIN/1.73 M^2
GLUCOSE SERPL-MCNC: 212 MG/DL (ref 70–110)
HCT VFR BLD AUTO: 38.2 % (ref 40–54)
HGB BLD-MCNC: 13.6 G/DL (ref 14–18)
IMM GRANULOCYTES # BLD AUTO: 0.01 K/UL (ref 0–0.04)
IMM GRANULOCYTES NFR BLD AUTO: 0.2 % (ref 0–0.5)
INR PPP: 1.9 (ref 0.8–1.2)
LYMPHOCYTES # BLD AUTO: 2 K/UL (ref 1–4.8)
LYMPHOCYTES NFR BLD: 30.6 % (ref 18–48)
MAGNESIUM SERPL-MCNC: 1.7 MG/DL (ref 1.6–2.6)
MCH RBC QN AUTO: 35.4 PG (ref 27–31)
MCHC RBC AUTO-ENTMCNC: 35.6 G/DL (ref 32–36)
MCV RBC AUTO: 100 FL (ref 82–98)
MONOCYTES # BLD AUTO: 0.5 K/UL (ref 0.3–1)
MONOCYTES NFR BLD: 6.9 % (ref 4–15)
NEUTROPHILS # BLD AUTO: 4 K/UL (ref 1.8–7.7)
NEUTROPHILS NFR BLD: 61.4 % (ref 38–73)
NRBC BLD-RTO: 0 /100 WBC
PLATELET # BLD AUTO: 75 K/UL (ref 150–450)
PMV BLD AUTO: 9.9 FL (ref 9.2–12.9)
POCT GLUCOSE: 193 MG/DL (ref 70–110)
POCT GLUCOSE: 224 MG/DL (ref 70–110)
POCT GLUCOSE: 226 MG/DL (ref 70–110)
POCT GLUCOSE: 250 MG/DL (ref 70–110)
POCT GLUCOSE: 262 MG/DL (ref 70–110)
POTASSIUM SERPL-SCNC: 3.8 MMOL/L (ref 3.5–5.1)
PROT SERPL-MCNC: 6.5 G/DL (ref 6–8.4)
PROTHROMBIN TIME: 19.5 SEC (ref 9–12.5)
RBC # BLD AUTO: 3.84 M/UL (ref 4.6–6.2)
SODIUM SERPL-SCNC: 143 MMOL/L (ref 136–145)
VANCOMYCIN TROUGH SERPL-MCNC: 3.6 UG/ML (ref 10–22)
WBC # BLD AUTO: 6.54 K/UL (ref 3.9–12.7)

## 2024-12-08 PROCEDURE — 83735 ASSAY OF MAGNESIUM: CPT | Performed by: STUDENT IN AN ORGANIZED HEALTH CARE EDUCATION/TRAINING PROGRAM

## 2024-12-08 PROCEDURE — 25000003 PHARM REV CODE 250: Performed by: CLINIC/CENTER

## 2024-12-08 PROCEDURE — 25000003 PHARM REV CODE 250

## 2024-12-08 PROCEDURE — 25000003 PHARM REV CODE 250: Performed by: STUDENT IN AN ORGANIZED HEALTH CARE EDUCATION/TRAINING PROGRAM

## 2024-12-08 PROCEDURE — 63600175 PHARM REV CODE 636 W HCPCS: Performed by: INTERNAL MEDICINE

## 2024-12-08 PROCEDURE — 99900031 HC PATIENT EDUCATION (STAT)

## 2024-12-08 PROCEDURE — 94761 N-INVAS EAR/PLS OXIMETRY MLT: CPT

## 2024-12-08 PROCEDURE — 99233 SBSQ HOSP IP/OBS HIGH 50: CPT | Mod: ,,, | Performed by: STUDENT IN AN ORGANIZED HEALTH CARE EDUCATION/TRAINING PROGRAM

## 2024-12-08 PROCEDURE — 36415 COLL VENOUS BLD VENIPUNCTURE: CPT | Mod: XB | Performed by: INTERNAL MEDICINE

## 2024-12-08 PROCEDURE — 63600175 PHARM REV CODE 636 W HCPCS

## 2024-12-08 PROCEDURE — 99900035 HC TECH TIME PER 15 MIN (STAT)

## 2024-12-08 PROCEDURE — 80202 ASSAY OF VANCOMYCIN: CPT | Performed by: INTERNAL MEDICINE

## 2024-12-08 PROCEDURE — 80053 COMPREHEN METABOLIC PANEL: CPT | Performed by: STUDENT IN AN ORGANIZED HEALTH CARE EDUCATION/TRAINING PROGRAM

## 2024-12-08 PROCEDURE — 36415 COLL VENOUS BLD VENIPUNCTURE: CPT | Performed by: STUDENT IN AN ORGANIZED HEALTH CARE EDUCATION/TRAINING PROGRAM

## 2024-12-08 PROCEDURE — 20000000 HC ICU ROOM

## 2024-12-08 PROCEDURE — 27000221 HC OXYGEN, UP TO 24 HOURS

## 2024-12-08 PROCEDURE — 85025 COMPLETE CBC W/AUTO DIFF WBC: CPT | Performed by: STUDENT IN AN ORGANIZED HEALTH CARE EDUCATION/TRAINING PROGRAM

## 2024-12-08 PROCEDURE — 63600175 PHARM REV CODE 636 W HCPCS: Performed by: STUDENT IN AN ORGANIZED HEALTH CARE EDUCATION/TRAINING PROGRAM

## 2024-12-08 PROCEDURE — 85610 PROTHROMBIN TIME: CPT | Performed by: INTERNAL MEDICINE

## 2024-12-08 RX ORDER — ACETAMINOPHEN 325 MG/1
650 TABLET ORAL EVERY 6 HOURS PRN
Status: DISCONTINUED | OUTPATIENT
Start: 2024-12-08 | End: 2024-12-08

## 2024-12-08 RX ORDER — VANCOMYCIN 2 GRAM/500 ML IN 0.9 % SODIUM CHLORIDE INTRAVENOUS
2000
Status: DISCONTINUED | OUTPATIENT
Start: 2024-12-08 | End: 2024-12-10 | Stop reason: HOSPADM

## 2024-12-08 RX ORDER — FAMOTIDINE 20 MG/1
20 TABLET, FILM COATED ORAL 2 TIMES DAILY
Status: DISCONTINUED | OUTPATIENT
Start: 2024-12-08 | End: 2024-12-08

## 2024-12-08 RX ORDER — LEVETIRACETAM 500 MG/1
500 TABLET ORAL 2 TIMES DAILY
Status: DISCONTINUED | OUTPATIENT
Start: 2024-12-08 | End: 2024-12-10 | Stop reason: HOSPADM

## 2024-12-08 RX ORDER — ACETAMINOPHEN 325 MG/1
650 TABLET ORAL EVERY 6 HOURS PRN
Status: DISCONTINUED | OUTPATIENT
Start: 2024-12-08 | End: 2024-12-10 | Stop reason: HOSPADM

## 2024-12-08 RX ORDER — METOPROLOL SUCCINATE 25 MG/1
50 TABLET, EXTENDED RELEASE ORAL ONCE
Status: COMPLETED | OUTPATIENT
Start: 2024-12-08 | End: 2024-12-08

## 2024-12-08 RX ORDER — MAGNESIUM SULFATE HEPTAHYDRATE 40 MG/ML
2 INJECTION, SOLUTION INTRAVENOUS ONCE
Status: COMPLETED | OUTPATIENT
Start: 2024-12-08 | End: 2024-12-08

## 2024-12-08 RX ORDER — METOPROLOL TARTRATE 1 MG/ML
2.5 INJECTION, SOLUTION INTRAVENOUS ONCE
Status: COMPLETED | OUTPATIENT
Start: 2024-12-08 | End: 2024-12-08

## 2024-12-08 RX ORDER — METOPROLOL SUCCINATE 100 MG/1
100 TABLET, EXTENDED RELEASE ORAL 2 TIMES DAILY
Status: DISCONTINUED | OUTPATIENT
Start: 2024-12-08 | End: 2024-12-10 | Stop reason: HOSPADM

## 2024-12-08 RX ORDER — INSULIN GLARGINE 100 [IU]/ML
36 INJECTION, SOLUTION SUBCUTANEOUS DAILY
Status: DISCONTINUED | OUTPATIENT
Start: 2024-12-09 | End: 2024-12-10 | Stop reason: HOSPADM

## 2024-12-08 RX ORDER — GUAIFENESIN 600 MG/1
600 TABLET, EXTENDED RELEASE ORAL 2 TIMES DAILY
Status: DISCONTINUED | OUTPATIENT
Start: 2024-12-08 | End: 2024-12-10 | Stop reason: HOSPADM

## 2024-12-08 RX ORDER — AMIODARONE HYDROCHLORIDE 100 MG/1
100 TABLET ORAL 2 TIMES DAILY
Status: DISCONTINUED | OUTPATIENT
Start: 2024-12-08 | End: 2024-12-10 | Stop reason: HOSPADM

## 2024-12-08 RX ADMIN — LEVETIRACETAM 500 MG: 500 TABLET, FILM COATED ORAL at 09:12

## 2024-12-08 RX ADMIN — LEVETIRACETAM 500 MG: 100 INJECTION, SOLUTION INTRAVENOUS at 08:12

## 2024-12-08 RX ADMIN — MUPIROCIN: 20 OINTMENT TOPICAL at 08:12

## 2024-12-08 RX ADMIN — FAMOTIDINE 20 MG: 10 INJECTION, SOLUTION INTRAVENOUS at 08:12

## 2024-12-08 RX ADMIN — INSULIN ASPART 2 UNITS: 100 INJECTION, SOLUTION INTRAVENOUS; SUBCUTANEOUS at 06:12

## 2024-12-08 RX ADMIN — METOROPROLOL TARTRATE 2.5 MG: 5 INJECTION, SOLUTION INTRAVENOUS at 08:12

## 2024-12-08 RX ADMIN — GUAIFENESIN 600 MG: 600 TABLET, EXTENDED RELEASE ORAL at 09:12

## 2024-12-08 RX ADMIN — WARFARIN SODIUM 4 MG: 1 TABLET ORAL at 04:12

## 2024-12-08 RX ADMIN — INSULIN ASPART 2 UNITS: 100 INJECTION, SOLUTION INTRAVENOUS; SUBCUTANEOUS at 04:12

## 2024-12-08 RX ADMIN — INSULIN ASPART 3 UNITS: 100 INJECTION, SOLUTION INTRAVENOUS; SUBCUTANEOUS at 09:12

## 2024-12-08 RX ADMIN — SPIRONOLACTONE 25 MG: 25 TABLET ORAL at 08:12

## 2024-12-08 RX ADMIN — METOPROLOL SUCCINATE 50 MG: 25 TABLET, EXTENDED RELEASE ORAL at 08:12

## 2024-12-08 RX ADMIN — METOPROLOL SUCCINATE 50 MG: 25 TABLET, EXTENDED RELEASE ORAL at 09:12

## 2024-12-08 RX ADMIN — Medication 2000 MG: at 08:12

## 2024-12-08 RX ADMIN — Medication 1750 MG: at 08:12

## 2024-12-08 RX ADMIN — INSULIN GLARGINE 33 UNITS: 100 INJECTION, SOLUTION SUBCUTANEOUS at 08:12

## 2024-12-08 RX ADMIN — AMIODARONE HYDROCHLORIDE 100 MG: 100 TABLET ORAL at 05:12

## 2024-12-08 RX ADMIN — CEFEPIME 2 G: 2 INJECTION, POWDER, FOR SOLUTION INTRAVENOUS at 12:12

## 2024-12-08 RX ADMIN — ACETAMINOPHEN 650 MG: 325 TABLET ORAL at 09:12

## 2024-12-08 RX ADMIN — SPIRONOLACTONE 25 MG: 25 TABLET ORAL at 09:12

## 2024-12-08 RX ADMIN — AMIODARONE HYDROCHLORIDE 100 MG: 100 TABLET ORAL at 09:12

## 2024-12-08 RX ADMIN — SODIUM CHLORIDE, POTASSIUM CHLORIDE, SODIUM LACTATE AND CALCIUM CHLORIDE: 600; 310; 30; 20 INJECTION, SOLUTION INTRAVENOUS at 06:12

## 2024-12-08 RX ADMIN — MUPIROCIN: 20 OINTMENT TOPICAL at 09:12

## 2024-12-08 RX ADMIN — CEFEPIME 2 G: 2 INJECTION, POWDER, FOR SOLUTION INTRAVENOUS at 05:12

## 2024-12-08 RX ADMIN — CEFEPIME 2 G: 2 INJECTION, POWDER, FOR SOLUTION INTRAVENOUS at 09:12

## 2024-12-08 RX ADMIN — INSULIN ASPART 2 UNITS: 100 INJECTION, SOLUTION INTRAVENOUS; SUBCUTANEOUS at 11:12

## 2024-12-08 RX ADMIN — MAGNESIUM SULFATE HEPTAHYDRATE 2 G: 40 INJECTION, SOLUTION INTRAVENOUS at 09:12

## 2024-12-08 RX ADMIN — METOPROLOL SUCCINATE 100 MG: 100 TABLET, EXTENDED RELEASE ORAL at 09:12

## 2024-12-08 NOTE — PROGRESS NOTES
Pharmacokinetic Assessment Follow Up: IV Vancomycin    Vancomycin serum concentration assessment(s):    The trough level was drawn correctly and can be used to guide therapy at this time. The measurement is below the desired definitive target range of 15 to 20 mcg/mL.    Vancomycin Regimen Plan:    Change regimen to Vancomycin 2000 mg IV every 12 hours with next serum trough concentration measured at 0730 prior to 4th dose on 12/10/24.    Drug levels (last 3 results):  Recent Labs   Lab Result Units 12/07/24  0357 12/08/24  0741   Vancomycin, Random ug/mL 8.5  --    Vancomycin-Trough ug/mL  --  3.6*       Pharmacy will continue to follow and monitor vancomycin.    Please contact pharmacy at extension [phone] for questions regarding this assessment.    Thank you for the consult,   Michelle Clifford       Patient brief summary:  Chapo Rosario is a 64 y.o. male initiated on antimicrobial therapy with IV Vancomycin for treatment of sepsis    The patient's current regimen is being changed from Vancomycin 1750 mg daily to Vancomycin 2000mg q12hr due to trough being 3.6. Patient's renal function has significantly improved.     Drug Allergies:   Review of patient's allergies indicates:  No Known Allergies    Actual Body Weight:   133.8 kg    Renal Function:   Estimated Creatinine Clearance: 111.4 mL/min (based on SCr of 1 mg/dL).,     Dialysis Method (if applicable):  N/A    CBC (last 72 hours):  Recent Labs   Lab Result Units 12/06/24  0900 12/06/24  1652 12/07/24  0357 12/08/24  0406   WBC K/uL 20.01*  --  7.91 6.54   Hemoglobin g/dL 16.3  --  13.8* 13.6*   Hemoglobin A1C %  --  8.1*  --   --    Hematocrit % 46.9  --  37.8* 38.2*   Platelets K/uL 193  --  82* 75*   Gran % % 64.8  --  66.6 61.4   Lymph % % 23.5  --  23.0 30.6   Mono % % 9.7  --  9.4 6.9   Eosinophil % % 0.1  --  0.1 0.6   Basophil % % 0.5  --  0.6 0.3   Differential Method  Automated  --  Automated Automated       Metabolic Panel (last 72 hours):  Recent Labs    Lab Result Units 12/06/24  0900 12/06/24  1044 12/06/24 2003 12/06/24 2014 12/07/24  0357 12/08/24  0406   Sodium mmol/L 139  --   --   --  142 143   Potassium mmol/L 4.3  --   --   --  4.4 3.8   Chloride mmol/L 102  --   --   --  107 109   CO2 mmol/L 12*  --   --   --  23 26   Glucose mg/dL 474*  --   --   --  328* 212*   Glucose, UA   --  4+*  --  4+*  --   --    BUN mg/dL 18  --   --   --  22 17   Creatinine mg/dL 2.0*  --   --   --  1.7* 1.0   Creatinine, Urine mg/dL  --  44.2 43.4  --   --   --    Albumin g/dL 3.8  --   --   --  2.9* 2.6*   Total Bilirubin mg/dL 1.4*  --   --   --  1.9* 2.9*   Alkaline Phosphatase U/L 103  --   --   --  70 70   AST U/L 31  --   --   --  31 27   ALT U/L 37  --   --   --  25 24   Magnesium mg/dL  --   --   --   --  1.8 1.7       Vancomycin Administrations:  vancomycin given in the last 96 hours                     vancomycin 1750 mg in 0.9% sodium chloride 500 mL IVPB (mg) 1,750 mg New Bag 12/08/24 0837     1,750 mg New Bag 12/07/24 0837    vancomycin 2 g in 0.9% sodium chloride 500 mL IVPB ()  Restarted 12/06/24 1717     2,000 mg New Bag  1434                    Microbiologic Results:  Microbiology Results (last 7 days)       Procedure Component Value Units Date/Time    Blood culture #1 **CANNOT BE ORDERED STAT** [[id number]] Collected: 12/06/24 1023    Order Status: Completed Specimen: Blood from Peripheral, Antecubital, Right Updated: 12/07/24 1813     Blood Culture, Routine No Growth to date      No Growth to date    Blood culture #2 **CANNOT BE ORDERED STAT** [[id number]] Collected: 12/06/24 1033    Order Status: Completed Specimen: Blood from Peripheral, Forearm, Left Updated: 12/07/24 1813     Blood Culture, Routine No Growth to date      No Growth to date    Culture, Respiratory with Gram Stain [[id number]] Collected: 12/06/24 1025    Order Status: Completed Specimen: Sputum from Endotracheal Aspirate Updated: 12/07/24 1143     Respiratory Culture Insufficient  incubation, culture in progress     Gram Stain (Respiratory) <10 epithelial cells per low power field.     Gram Stain (Respiratory) Rare WBC's     Gram Stain (Respiratory) Rare Gram positive cocci

## 2024-12-08 NOTE — CARE UPDATE
12/08/24 1527   Patient Assessment/Suction   Level of Consciousness (AVPU) alert   Respiratory Effort Unlabored   Expansion/Accessory Muscles/Retractions no use of accessory muscles   Rhythm/Pattern, Respiratory unlabored;pattern regular   PRE-TX-O2   Device (Oxygen Therapy) (S)  room air   SpO2 (S)  97 %   Pulse 99   Resp (!) 24   Respiratory Evaluation   $ Care Plan Tech Time 15 min

## 2024-12-08 NOTE — PLAN OF CARE
Awake and alert - visiting with family in room - now on room air - no sob or distress - oriented - follows commands - a fib with cvr - pulses present - abdomen soft - tolerating diet - no bm - tadeo catheter draining orange/pick urine - saline lock x 3 - lr at 125 ml per hour - will dc lr when this bag complete per dr peres and encourage po fluids - no complaints of pain or distress

## 2024-12-08 NOTE — PLAN OF CARE
Problem: Infection  Goal: Absence of Infection Signs and Symptoms  Outcome: Progressing     Problem: Adult Inpatient Plan of Care  Goal: Plan of Care Review  Outcome: Progressing  Goal: Patient-Specific Goal (Individualized)  Outcome: Progressing  Goal: Absence of Hospital-Acquired Illness or Injury  Outcome: Progressing  Goal: Optimal Comfort and Wellbeing  Outcome: Progressing  Goal: Readiness for Transition of Care  Outcome: Progressing     Problem: Fall Injury Risk  Goal: Absence of Fall and Fall-Related Injury  Outcome: Progressing     Problem: Skin Injury Risk Increased  Goal: Skin Health and Integrity  Outcome: Progressing     Problem: Sepsis/Septic Shock  Goal: Optimal Coping  Outcome: Progressing  Goal: Absence of Bleeding  Outcome: Progressing  Goal: Blood Glucose Level Within Targeted Range  Outcome: Progressing  Goal: Absence of Infection Signs and Symptoms  Outcome: Progressing  Goal: Optimal Nutrition Intake  Outcome: Progressing     Problem: Acute Kidney Injury/Impairment  Goal: Fluid and Electrolyte Balance  Outcome: Progressing  Goal: Improved Oral Intake  Outcome: Progressing  Goal: Effective Renal Function  Outcome: Progressing     Problem: Pneumonia  Goal: Fluid Balance  Outcome: Progressing  Goal: Resolution of Infection Signs and Symptoms  Outcome: Progressing  Goal: Effective Oxygenation and Ventilation  Outcome: Progressing

## 2024-12-08 NOTE — NURSING
Patient's heart rate still 134, checked with  she agreed to give the Digoxin that cardiology ordered. Digoxin given at 2228, , Metoprolol 50 mg extended release was given at 1839 . Will continue to monitor, patient has no complaints.

## 2024-12-09 PROBLEM — E11.9 TYPE 2 DIABETES MELLITUS, WITHOUT LONG-TERM CURRENT USE OF INSULIN: Status: ACTIVE | Noted: 2024-12-06

## 2024-12-09 LAB
ALBUMIN SERPL BCP-MCNC: 2.5 G/DL (ref 3.5–5.2)
ALP SERPL-CCNC: 78 U/L (ref 55–135)
ALT SERPL W/O P-5'-P-CCNC: 26 U/L (ref 10–44)
ANION GAP SERPL CALC-SCNC: 10 MMOL/L (ref 3–11)
AST SERPL-CCNC: 33 U/L (ref 10–40)
BACTERIA SPEC AEROBE CULT: ABNORMAL
BASOPHILS # BLD AUTO: 0.04 K/UL (ref 0–0.2)
BASOPHILS NFR BLD: 0.7 % (ref 0–1.9)
BILIRUB SERPL-MCNC: 2.5 MG/DL (ref 0.1–1)
BUN SERPL-MCNC: 16 MG/DL (ref 8–23)
CALCIUM SERPL-MCNC: 7.8 MG/DL (ref 8.7–10.5)
CHLORIDE SERPL-SCNC: 108 MMOL/L (ref 95–110)
CO2 SERPL-SCNC: 24 MMOL/L (ref 23–29)
CREAT SERPL-MCNC: 1 MG/DL (ref 0.5–1.4)
DIFFERENTIAL METHOD BLD: ABNORMAL
EOSINOPHIL # BLD AUTO: 0.1 K/UL (ref 0–0.5)
EOSINOPHIL NFR BLD: 2.4 % (ref 0–8)
ERYTHROCYTE [DISTWIDTH] IN BLOOD BY AUTOMATED COUNT: 13.2 % (ref 11.5–14.5)
EST. GFR  (NO RACE VARIABLE): >60 ML/MIN/1.73 M^2
GLUCOSE SERPL-MCNC: 198 MG/DL (ref 70–110)
GRAM STN SPEC: ABNORMAL
HCT VFR BLD AUTO: 39.1 % (ref 40–54)
HGB BLD-MCNC: 14.1 G/DL (ref 14–18)
IMM GRANULOCYTES # BLD AUTO: 0.02 K/UL (ref 0–0.04)
IMM GRANULOCYTES NFR BLD AUTO: 0.3 % (ref 0–0.5)
INR PPP: 1.9 (ref 0.8–1.2)
LYMPHOCYTES # BLD AUTO: 2.1 K/UL (ref 1–4.8)
LYMPHOCYTES NFR BLD: 36.8 % (ref 18–48)
MAGNESIUM SERPL-MCNC: 1.6 MG/DL (ref 1.6–2.6)
MCH RBC QN AUTO: 35.3 PG (ref 27–31)
MCHC RBC AUTO-ENTMCNC: 36.1 G/DL (ref 32–36)
MCV RBC AUTO: 98 FL (ref 82–98)
MONOCYTES # BLD AUTO: 0.4 K/UL (ref 0.3–1)
MONOCYTES NFR BLD: 6.8 % (ref 4–15)
NEUTROPHILS # BLD AUTO: 3.1 K/UL (ref 1.8–7.7)
NEUTROPHILS NFR BLD: 53 % (ref 38–73)
NRBC BLD-RTO: 0 /100 WBC
PLATELET # BLD AUTO: 96 K/UL (ref 150–450)
PMV BLD AUTO: 9.7 FL (ref 9.2–12.9)
POCT GLUCOSE: 184 MG/DL (ref 70–110)
POCT GLUCOSE: 193 MG/DL (ref 70–110)
POCT GLUCOSE: 234 MG/DL (ref 70–110)
POCT GLUCOSE: 259 MG/DL (ref 70–110)
POTASSIUM SERPL-SCNC: 3.8 MMOL/L (ref 3.5–5.1)
PROT SERPL-MCNC: 6.4 G/DL (ref 6–8.4)
PROTHROMBIN TIME: 19 SEC (ref 9–12.5)
RBC # BLD AUTO: 3.99 M/UL (ref 4.6–6.2)
SODIUM SERPL-SCNC: 142 MMOL/L (ref 136–145)
WBC # BLD AUTO: 5.76 K/UL (ref 3.9–12.7)

## 2024-12-09 PROCEDURE — 25000003 PHARM REV CODE 250: Performed by: NURSE PRACTITIONER

## 2024-12-09 PROCEDURE — 83735 ASSAY OF MAGNESIUM: CPT | Performed by: STUDENT IN AN ORGANIZED HEALTH CARE EDUCATION/TRAINING PROGRAM

## 2024-12-09 PROCEDURE — 99900035 HC TECH TIME PER 15 MIN (STAT)

## 2024-12-09 PROCEDURE — 97116 GAIT TRAINING THERAPY: CPT

## 2024-12-09 PROCEDURE — 25000003 PHARM REV CODE 250: Performed by: INTERNAL MEDICINE

## 2024-12-09 PROCEDURE — 85025 COMPLETE CBC W/AUTO DIFF WBC: CPT | Performed by: STUDENT IN AN ORGANIZED HEALTH CARE EDUCATION/TRAINING PROGRAM

## 2024-12-09 PROCEDURE — 97112 NEUROMUSCULAR REEDUCATION: CPT

## 2024-12-09 PROCEDURE — 94761 N-INVAS EAR/PLS OXIMETRY MLT: CPT

## 2024-12-09 PROCEDURE — A4216 STERILE WATER/SALINE, 10 ML: HCPCS | Performed by: INTERNAL MEDICINE

## 2024-12-09 PROCEDURE — 85610 PROTHROMBIN TIME: CPT | Performed by: NURSE PRACTITIONER

## 2024-12-09 PROCEDURE — 36415 COLL VENOUS BLD VENIPUNCTURE: CPT | Performed by: NURSE PRACTITIONER

## 2024-12-09 PROCEDURE — 20000000 HC ICU ROOM

## 2024-12-09 PROCEDURE — 25000003 PHARM REV CODE 250

## 2024-12-09 PROCEDURE — 80053 COMPREHEN METABOLIC PANEL: CPT | Performed by: STUDENT IN AN ORGANIZED HEALTH CARE EDUCATION/TRAINING PROGRAM

## 2024-12-09 PROCEDURE — 27000221 HC OXYGEN, UP TO 24 HOURS

## 2024-12-09 PROCEDURE — 25000003 PHARM REV CODE 250: Performed by: STUDENT IN AN ORGANIZED HEALTH CARE EDUCATION/TRAINING PROGRAM

## 2024-12-09 PROCEDURE — 99900031 HC PATIENT EDUCATION (STAT)

## 2024-12-09 PROCEDURE — 36415 COLL VENOUS BLD VENIPUNCTURE: CPT | Performed by: STUDENT IN AN ORGANIZED HEALTH CARE EDUCATION/TRAINING PROGRAM

## 2024-12-09 PROCEDURE — 63600175 PHARM REV CODE 636 W HCPCS: Performed by: STUDENT IN AN ORGANIZED HEALTH CARE EDUCATION/TRAINING PROGRAM

## 2024-12-09 PROCEDURE — 97166 OT EVAL MOD COMPLEX 45 MIN: CPT

## 2024-12-09 PROCEDURE — 97530 THERAPEUTIC ACTIVITIES: CPT

## 2024-12-09 PROCEDURE — 97163 PT EVAL HIGH COMPLEX 45 MIN: CPT

## 2024-12-09 PROCEDURE — 27000207 HC ISOLATION

## 2024-12-09 RX ORDER — DILTIAZEM HYDROCHLORIDE 5 MG/ML
10 INJECTION INTRAVENOUS ONCE
Status: COMPLETED | OUTPATIENT
Start: 2024-12-09 | End: 2024-12-09

## 2024-12-09 RX ORDER — DILTIAZEM HYDROCHLORIDE 5 MG/ML
10 INJECTION INTRAVENOUS EVERY 8 HOURS PRN
Status: DISCONTINUED | OUTPATIENT
Start: 2024-12-09 | End: 2024-12-10 | Stop reason: HOSPADM

## 2024-12-09 RX ADMIN — Medication 10 ML: at 08:12

## 2024-12-09 RX ADMIN — MUPIROCIN: 20 OINTMENT TOPICAL at 08:12

## 2024-12-09 RX ADMIN — CEFEPIME 2 G: 2 INJECTION, POWDER, FOR SOLUTION INTRAVENOUS at 09:12

## 2024-12-09 RX ADMIN — INSULIN GLARGINE 36 UNITS: 100 INJECTION, SOLUTION SUBCUTANEOUS at 09:12

## 2024-12-09 RX ADMIN — AMIODARONE HYDROCHLORIDE 100 MG: 100 TABLET ORAL at 08:12

## 2024-12-09 RX ADMIN — LEVETIRACETAM 500 MG: 500 TABLET, FILM COATED ORAL at 09:12

## 2024-12-09 RX ADMIN — INSULIN ASPART 1 UNITS: 100 INJECTION, SOLUTION INTRAVENOUS; SUBCUTANEOUS at 08:12

## 2024-12-09 RX ADMIN — Medication 2000 MG: at 08:12

## 2024-12-09 RX ADMIN — Medication 2000 MG: at 10:12

## 2024-12-09 RX ADMIN — SPIRONOLACTONE 25 MG: 25 TABLET ORAL at 09:12

## 2024-12-09 RX ADMIN — DILTIAZEM HYDROCHLORIDE 10 MG: 5 INJECTION INTRAVENOUS at 06:12

## 2024-12-09 RX ADMIN — INSULIN ASPART 2 UNITS: 100 INJECTION, SOLUTION INTRAVENOUS; SUBCUTANEOUS at 11:12

## 2024-12-09 RX ADMIN — AMIODARONE HYDROCHLORIDE 100 MG: 100 TABLET ORAL at 09:12

## 2024-12-09 RX ADMIN — WARFARIN SODIUM 6 MG: 5 TABLET ORAL at 04:12

## 2024-12-09 RX ADMIN — METOPROLOL SUCCINATE 100 MG: 100 TABLET, EXTENDED RELEASE ORAL at 08:12

## 2024-12-09 RX ADMIN — GUAIFENESIN 600 MG: 600 TABLET, EXTENDED RELEASE ORAL at 09:12

## 2024-12-09 RX ADMIN — CEFEPIME 2 G: 2 INJECTION, POWDER, FOR SOLUTION INTRAVENOUS at 05:12

## 2024-12-09 RX ADMIN — GUAIFENESIN 600 MG: 600 TABLET, EXTENDED RELEASE ORAL at 08:12

## 2024-12-09 RX ADMIN — LEVETIRACETAM 500 MG: 500 TABLET, FILM COATED ORAL at 08:12

## 2024-12-09 RX ADMIN — MUPIROCIN: 20 OINTMENT TOPICAL at 09:12

## 2024-12-09 RX ADMIN — CEFEPIME 2 G: 2 INJECTION, POWDER, FOR SOLUTION INTRAVENOUS at 01:12

## 2024-12-09 RX ADMIN — METOPROLOL SUCCINATE 100 MG: 100 TABLET, EXTENDED RELEASE ORAL at 09:12

## 2024-12-09 RX ADMIN — DILTIAZEM HYDROCHLORIDE 10 MG: 5 INJECTION INTRAVENOUS at 02:12

## 2024-12-09 RX ADMIN — SPIRONOLACTONE 25 MG: 25 TABLET ORAL at 08:12

## 2024-12-09 NOTE — PT/OT/SLP EVAL
Occupational Therapy   Evaluation    Name: Chapo Rosario  MRN: [id number]  Admitting Diagnosis: Sepsis with encephalopathy without septic shock  Recent Surgery: * No surgery found *      Recommendations:     Discharge Recommendations:  (To be further determined based on progress prior to discharge.)  Discharge Equipment Recommendations:  other (see comments) (To be further determined based on progress prior to discharge; but, potentially none.)  Barriers to discharge:  Other (Comment) (Medical status)    Assessment:     Chapo Rosario is a 64 y.o. male with a medical diagnosis of Sepsis with encephalopathy without septic shock.  He presents with functional deficits impacting independence with ADL's including functional mobility. Performance deficits affecting function: weakness, impaired endurance, impaired self care skills, impaired functional mobility, impaired balance, decreased upper extremity function, decreased lower extremity function, decreased safety awareness, decreased ROM, impaired cardiopulmonary response to activity, impaired joint extensibility, impaired muscle length. He demonstrated good effort and motivation to regain independence.     Rehab Prognosis: Good; patient would benefit from acute skilled OT services to address these deficits and reach maximum level of function.       Plan:     Patient to be seen 5 x/week to address the above listed problems via self-care/home management, therapeutic activities, therapeutic exercises  Plan of Care Expires: 12/18/24  Plan of Care Reviewed with: patient    Subjective     Chief Complaint: weakness  Patient/Family Comments/goals: Pt would like to return home with significant other.     Occupational Profile:  Living Environment: Pt lives with his significant other and mother in a H without steps.  Previous level of function: Independent  Roles and Routines: ADL's and IADL's, retired  Equipment Used at Home: none (But, has access to RW.)  Assistance upon  Discharge: Significant other    Pain/Comfort:  Pain Rating 1: 0/10  Pain Rating Post-Intervention 1: 0/10    Patients cultural, spiritual, Zoroastrian conflicts given the current situation: no    Objective:     Communicated with: nurse prior to session.  Patient found HOB elevated with telemetry, SCD, pulse ox (continuous), peripheral IV, tadeo catheter, blood pressure cuff upon OT entry to room.    General Precautions: Standard, fall  Orthopedic Precautions: N/A  Braces: N/A  Respiratory Status: Room air    Occupational Performance:    Bed Mobility:    Patient completed Rolling/Turning to Left with  supervision  Patient completed Rolling/Turning to Right with supervision  Patient completed Scooting/Bridging with supervision  Patient completed Supine to Sit with supervision  Patient completed Sit to Supine with supervision    Functional Mobility/Transfers:  Patient completed Sit <> Stand Transfer with supervision  with  rolling walker   Patient completed Bed <> Chair Transfer using Step Transfer technique with supervision with rolling walker  Patient completed Toilet Transfer Step Transfer technique with supervision with  rolling walker  Functional Mobility: Pt ambulated 200' between surfaces requiring supervision utilizing RW with setup of tadeo catheter and IV pole.    Activities of Daily Living:  Feeding:  independence    Grooming: setup assistance    Bathing: minimum assistance    Upper Body Dressing: setup assistance    Lower Body Dressing: minimum assistance    Toileting: minimum assistance      Cognitive/Visual Perceptual:  Cognitive/Psychosocial Skills:  -       Oriented to: Person, Place, Time, and Situation   -       Follows Commands/attention:Follows multistep  commands  -       Communication: clear/fluent  -       Memory: No Deficits noted  -       Safety awareness/insight to disability: impaired   -       Mood/Affect/Coping skills/emotional control: Cooperative and Pleasant    Physical Exam:  Postural  examination/scapula alignment: -       Rounded shoulders  Sensation: -       Intact  bilateral UE's  Dominant hand: -       Right  Upper Extremity Range of Motion:  -       Right Upper Extremity: WFL  -       Left Upper Extremity: WFL  Upper Extremity Strength: -       Right Upper Extremity: grossly 4+/5  -       Left Upper Extremity: grossly 4+/5  Fine Motor Coordination: -       WFL  Gross motor coordination: WFL    AMPAC 6 Click ADL:  AMPAC Total Score: 21    Treatment & Education:  Pt was provided education / instruction regarding role of OT and established OT POC.    Patient left HOB elevated with all lines intact, call button in reach, and nurse notified    GOALS:   Goals to be met by: 12/18/24     Patient will increase functional independence with ADLs by performing:    UE Dressing with Modified [place].  LE Dressing with Modified [place].  Grooming while standing at sink with Modified [place].  Toileting from toilet with Modified [place] for hygiene and clothing management.   Bathing from  shower chair/bench with Modified [place].  Toilet transfer to toilet with Modified [place].      History:     Past Medical History:   Diagnosis Date    LEEANN (acute kidney injury) 11/13/2023    Anticoagulant long-term use     Aortic aneurysm     Atrial fibrillation     COVID-19 vaccine administered     Group D streptococcal infection     Hyperlipidemia     Hypertension     Pneumonia, unspecified organism     Severe sepsis 08/30/2018    Sleep apnea     Thrombocytopenia 11/13/2023    VHD (valvular heart disease)          Past Surgical History:   Procedure Laterality Date    ABLATION N/A 4/1/2024    Procedure: Afib Ablation;  Surgeon: Hung Stacy MD;  Location: [facility] CATH;  Service: Cardiology;  Laterality: N/A;  ops # 5    aortic valve replacement      APPENDECTOMY      CARDIAC SURGERY  2013    MECHANICAL AVR    CHOLECYSTECTOMY      COLONOSCOPY N/A 2/14/2024    Procedure: COLONOSCOPY;   Surgeon: Ta Flowesr MD;  Location: [facility];  Service: Endoscopy;  Laterality: N/A;    CORONARY ANGIOGRAPHY      ECHOCARDIOGRAM, TRANSESOPHAGEAL      ESOPHAGOGASTRODUODENOSCOPY N/A 2/14/2024    Procedure: EGD (ESOPHAGOGASTRODUODENOSCOPY);  Surgeon: Ta Flowers MD;  Location: [facility];  Service: Endoscopy;  Laterality: N/A;    NECK SURGERY      TRANSESOPHAGEAL ECHOCARDIOGRAPHY N/A 11/17/2023    Procedure: ECHOCARDIOGRAM, TRANSESOPHAGEAL;  Surgeon: Antonio Ortiz MD;  Location: [facility];  Service: Cardiovascular;  Laterality: N/A;    TREATMENT OF CARDIAC ARRHYTHMIA N/A 11/17/2023    Procedure: Cardioversion or Defibrillation;  Surgeon: Antonio Ortiz MD;  Location: [facility];  Service: Cardiovascular;  Laterality: N/A;       Time Tracking:     OT Date of Treatment: 12/09/24  OT Start Time: 1045  OT Stop Time: 1119  OT Total Time (min): 34 min    Billable Minutes:Evaluation 34    12/9/2024

## 2024-12-09 NOTE — ASSESSMENT & PLAN NOTE
"This patient does have evidence of infective focus  My overall impression is septic shock due to lactate > 4.  Source: Respiratory and Urinary Tract  Antibiotics given-   Antibiotics (72h ago, onward)    Start     Stop Route Frequency Ordered    12/08/24 2030  vancomycin 2 g in 0.9% sodium chloride 500 mL IVPB         -- IV Every 12 hours (non-standard times) 12/08/24 0855    12/06/24 2115  ceFEPIme injection 2 g         -- IV Every 8 hours (non-standard times) 12/06/24 2012 12/06/24 2112  vancomycin - pharmacy to dose  (vancomycin IVPB (PEDS and ADULTS))        Placed in "And" Linked Group    -- IV pharmacy to manage frequency 12/06/24 2013 12/06/24 1300  mupirocin 2 % ointment         12/11/24 0859 Nasl 2 times daily 12/06/24 1149        Latest lactate reviewed- downtrending 9.2-->2.5 --> 1.9  Recent Labs   Lab 12/06/24 2038   LACTATE 1.9       Organ dysfunction indicated by Acute kidney injury, Acute respiratory failure, and Encephalopathy    Fluid challenge Ideal Body Weight- The patient's ideal body weight is Ideal body weight: 82.2 kg (181 lb 3.5 oz) which will be used to calculate fluid bolus of 30 ml/kg for treatment of septic shock.      Post- resuscitation assessment Yes Perfusion exam was performed within 6 hours of septic shock presentation after bolus shows Adequate tissue perfusion assessed by non-invasive monitoring       Will Not start Pressors- Levophed for MAP of 65  Source control achieved by: IVF, IV antibiotics  "

## 2024-12-09 NOTE — ASSESSMENT & PLAN NOTE
Patient has paroxysmal (<7 days) atrial fibrillation. Patient is currently in sinus rhythm. [id number] Score: The patient doesn't have any registry metric data available. The patients heart rate in the last 24 hours is as follows:  Pulse  Min: 75  Max: 133     Antiarrhythmics  metoprolol succinate (TOPROL-XL) 24 hr tablet 100 mg, 2 times daily, Oral  amiodarone tablet 100 mg, 2 times daily, Oral    Anticoagulants  warfarin (COUMADIN) tablet 4 mg, Every Sat, Sun, Oral  warfarin tablet 6 mg, Every Mon, Tues, Wed, Thurs, Fri, OralHome warfarin    Plan  - Replete lytes with a goal of K>4, Mg >2  - Patient is anticoagulated, see medications listed above.  - Patient's afib is currently controlled  - continue dilatiazem gtt PRN per cardiology recommendations

## 2024-12-09 NOTE — PLAN OF CARE
Problem: Occupational Therapy  Goal: Occupational Therapy Goal  Description: Goals to be met by: 12/18/24     Patient will increase functional independence with ADLs by performing:    UE Dressing with Modified [place].  LE Dressing with Modified [place].  Grooming while standing at sink with Modified [place].  Toileting from toilet with Modified [place] for hygiene and clothing management.   Bathing from  shower chair/bench with Modified [place].  Toilet transfer to toilet with Modified [place].    Outcome: Established OT POC

## 2024-12-09 NOTE — ASSESSMENT & PLAN NOTE
Witnessed seizure enroute to ER via EMS.   In the ER patient started on keppra 1500 mg x1.   CT head with no acute intracranial findings, old lacunar infarct suspected of the left basal ganglia. Small remote infarcts also questioned of the left cerebellar hemisphere and right frontal lobe  Likely sources infectious, metabolic  - continue keppra BID  - seizure precautions     12/9/24:  Check levetiracetam level in am.

## 2024-12-09 NOTE — ASSESSMENT & PLAN NOTE
"Patient has a diagnosis of pneumonia. The cause of the pneumonia is bacterial in etiology due to staph aureus . The pneumonia is stable. The patient has the following signs/symptoms of pneumonia: cough, sputum production, and shortness of breath. The patient does have a current oxygen requirement and the patient does not have a home oxygen requirement. I have reviewed the pertinent imaging. The following cultures have been collected: Blood cultures and gram stain sputum PCR study  The culture results are listed below.     Current antimicrobial regimen consists of the antibiotics listed below. Will monitor patient closely and continue current treatment plan unchanged.    Antibiotics (From admission, onward)      Start     Stop Route Frequency Ordered    12/08/24 2030  vancomycin 2 g in 0.9% sodium chloride 500 mL IVPB         -- IV Every 12 hours (non-standard times) 12/08/24 0855    12/06/24 2115  ceFEPIme injection 2 g         -- IV Every 8 hours (non-standard times) 12/06/24 2012 12/06/24 2112  vancomycin - pharmacy to dose  (vancomycin IVPB (PEDS and ADULTS))        Placed in "And" Linked Group    -- IV pharmacy to manage frequency 12/06/24 2013 12/06/24 1300  mupirocin 2 % ointment         12/11/24 0859 Nasl 2 times daily 12/06/24 1149            Microbiology Results (last 7 days)       Procedure Component Value Units Date/Time    Blood culture #2 **CANNOT BE ORDERED STAT** [[id number]] Collected: 12/06/24 1033    Order Status: Completed Specimen: Blood from Peripheral, Forearm, Left Updated: 12/08/24 1812     Blood Culture, Routine No Growth to date      No Growth to date      No Growth to date    Blood culture #1 **CANNOT BE ORDERED STAT** [[id number]] Collected: 12/06/24 1023    Order Status: Completed Specimen: Blood from Peripheral, Antecubital, Right Updated: 12/08/24 1812     Blood Culture, Routine No Growth to date      No Growth to date      No Growth to date    Culture, Respiratory with Gram " Stain [[id number]]  (Abnormal) Collected: 12/06/24 1025    Order Status: Completed Specimen: Sputum from Endotracheal Aspirate Updated: 12/08/24 1115     Respiratory Culture STAPHYLOCOCCUS AUREUS  Few  Susceptibility pending  Normal respiratory olena also present       Gram Stain (Respiratory) <10 epithelial cells per low power field.     Gram Stain (Respiratory) Rare WBC's     Gram Stain (Respiratory) Rare Gram positive cocci

## 2024-12-09 NOTE — PROGRESS NOTES
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 He was intubated. Patient treated for sepsis/ Pneumonia. Also in A.fib with rvr. Patient condition improved. Extubated yesterday. Per report, patient remains a.fib with rvr. [facility] asked to evalute for A.fib        Review of Systems   Constitutional: Positive for fever and malaise/fatigue.   HENT: Negative.     Cardiovascular:  Positive for dyspnea on exertion.   Respiratory:  Positive for shortness of breath.    Skin: Negative.    Musculoskeletal:  Positive for arthritis and back pain.   Gastrointestinal: Negative.    Genitourinary: Negative.    Neurological: Negative.      Objective:     Vital Signs (Most Recent):  Temp: 97.6 °F (36.4 °C) (12/09/24 1501)  Pulse: (!) 130 (12/09/24 1700)  Resp: (!) 21 (12/09/24 1700)  BP: (!) 157/94 (12/09/24 1700)  SpO2: (!) 92 % (12/09/24 1700) Vital Signs (24h Range):  Temp:  [97 °F (36.1 °C)-97.8 °F (36.6 °C)] 97.6 °F (36.4 °C)  Pulse:  [] 130  Resp:  [17-31] 21  SpO2:  [89 %-97 %] 92 %  BP: ()/(53-95) 157/94     Weight: 133.8 kg (294 lb 15.6 oz)  Body mass index is 37.87 kg/m².    SpO2: (!) 92 %         Intake/Output Summary (Last 24 hours) at 12/9/2024 1724  Last data filed at 12/9/2024 1718  Gross per 24 hour   Intake 2003.15 ml   Output 3050  "ml   Net -1046.85 ml       Lines/Drains/Airways       Peripheral Intravenous Line  Duration                  Peripheral IV - Single Lumen 12/08/24 2025 20 G Anterior;Right Forearm <1 day                    Physical Exam  Constitutional:       Appearance: He is ill-appearing.   Cardiovascular:      Rate and Rhythm: Tachycardia present. Rhythm irregular.   Pulmonary:      Effort: Pulmonary effort is normal.      Breath sounds: Rhonchi present.   Abdominal:      Palpations: Abdomen is soft.   Musculoskeletal:         General: Normal range of motion.   Skin:     General: Skin is warm and dry.      Capillary Refill: Capillary refill takes 2 to 3 seconds.         Significant Labs: CMP   Recent Labs   Lab 12/08/24  0406 12/09/24  0728    142   K 3.8 3.8    108   CO2 26 24   * 198*   BUN 17 16   CREATININE 1.0 1.0   CALCIUM 8.2* 7.8*   PROT 6.5 6.4   ALBUMIN 2.6* 2.5*   BILITOT 2.9* 2.5*   ALKPHOS 70 78   AST 27 33   ALT 24 26   ANIONGAP 8 10   , CBC   Recent Labs   Lab 12/08/24  0406 12/09/24  0728   WBC 6.54 5.76   HGB 13.6* 14.1   HCT 38.2* 39.1*   PLT 75* 96*   , Troponin No results for input(s): "TROPONINI" in the last 48 hours., and All pertinent lab results from the last 24 hours have been reviewed.    Significant Imaging: X-Ray: CXR: X-Ray Chest 1 View (CXR):   Results for orders placed or performed during the hospital encounter of 12/06/24   X-Ray Chest 1 View    Narrative    EXAMINATION:  XR CHEST 1 VIEW    CLINICAL HISTORY:  Presence of other specified devices    TECHNIQUE:  portable chest x-ray    COMPARISON:  12/06/2024 at 09:36.  <Comparisons>    FINDINGS:  There is ET tube and NG tube in place.  There is left basilar infiltrate.      Impression    ET tube and NG tube in place with sternotomy wires with left basilar infiltrate.      Electronically signed by: Nury Vargas MD  Date:    12/06/2024  Time:    11:12     Assessment and Plan:    Atrial fibrillation with rvr: Patient experiencing " intermittent bouts of rapid heart rate and is currently on amiodarone 100mg po bid. Heart rate will likely improve to baseline once his underlying infection has resolved. Continue with current medical regimen.   - If heart rate difficult to control would consider IV amiodarone +/- DCCV if he becomes unstable hemodynamically.  Sepsis/Pneumonia: Supportive medical therapy  Mechanical AVR: Blood cultures negative thus. Would consider GISELE of blood cultures become positive.   Hypertension: BP reasonably well controlled           Active Diagnoses:    Diagnosis Date Noted POA    PRINCIPAL PROBLEM:  Sepsis with encephalopathy without septic shock [A41.9, R65.20, G93.41] 08/30/2018 Yes    Pneumonia of left lower lobe due to infectious organism [J18.9] 12/07/2024 Yes    New onset seizure [R56.9] 12/06/2024 Yes    Encephalopathy, metabolic [G93.41] 12/06/2024 Yes    Type 2 diabetes mellitus, without long-term current use of insulin [E11.9] 12/06/2024 Yes    Endotracheal tube present [Z97.8] 12/06/2024 Yes    Atrial fibrillation with RVR [I48.91] 12/06/2024 Yes    Class 2 severe obesity with serious comorbidity and body mass index (BMI) of 37.0 to 37.9 in adult [E66.812, Z68.37, E66.01] 12/02/2023 Not Applicable    Hyperlipidemia [E78.5] 11/30/2023 Yes    Primary hypertension [I10] 11/30/2023 Yes    LEEANN (acute kidney injury) [N17.9] 11/13/2023 Yes      Problems Resolved During this Admission:       VTE Risk Mitigation (From admission, onward)           Ordered     warfarin tablet 6 mg  Every Mon, Tues, Wed, Thurs, Fri         12/07/24 1405     warfarin (COUMADIN) tablet 4 mg  Every Sat, Sun         12/07/24 1405     Place AINSLEY hose  Until discontinued         12/06/24 1946     IP VTE HIGH RISK PATIENT  Once         12/06/24 1147     Place sequential compression device  Until discontinued         12/06/24 1147                  Current Facility-Administered Medications   Medication    acetaminophen tablet 650 mg    amiodarone  tablet 100 mg    ceFEPIme injection 2 g    dextrose 50% injection 12.5 g    diltiaZEM injection 10 mg    etomidate injection    glucagon (human recombinant) injection 1 mg    guaiFENesin 12 hr tablet 600 mg    insulin aspart U-100 pen 0-5 Units    insulin glargine U-100 (Lantus) pen 36 Units    levETIRAcetam tablet 500 mg    metoprolol succinate (TOPROL-XL) 24 hr tablet 100 mg    mupirocin 2 % ointment    ondansetron injection 4 mg    rocuronium injection    sodium chloride 0.9% flush 10 mL    spironolactone tablet 25 mg    vancomycin - pharmacy to dose    vancomycin 2 g in 0.9% sodium chloride 500 mL IVPB    warfarin (COUMADIN) tablet 4 mg    warfarin tablet 6 mg     Facility-Administered Medications Ordered in Other Encounters   Medication    0.9%  NaCl infusion         Antonio Ortiz MD scribed for Dr. Ortiz  Cardiology  [facility]ensive [facility]

## 2024-12-09 NOTE — ASSESSMENT & PLAN NOTE
"This patient does have evidence of infective focus  My overall impression is septic shock due to lactate > 4.  Source: Respiratory and Urinary Tract  Antibiotics given-   Antibiotics (72h ago, onward)      Start     Stop Route Frequency Ordered    12/08/24 2030  vancomycin 2 g in 0.9% sodium chloride 500 mL IVPB         -- IV Every 12 hours (non-standard times) 12/08/24 0855    12/06/24 2115  ceFEPIme injection 2 g         -- IV Every 8 hours (non-standard times) 12/06/24 2012 12/06/24 2112  vancomycin - pharmacy to dose  (vancomycin IVPB (PEDS and ADULTS))        Placed in "And" Linked Group    -- IV pharmacy to manage frequency 12/06/24 2013 12/06/24 1300  mupirocin 2 % ointment         12/11/24 0859 Nasl 2 times daily 12/06/24 1149          Latest lactate reviewed- downtrending 9.2-->2.5 --> 1.9  Recent Labs   Lab 12/06/24 2038   LACTATE 1.9       Organ dysfunction indicated by Acute kidney injury, Acute respiratory failure, and Encephalopathy    Fluid challenge Ideal Body Weight- The patient's ideal body weight is Ideal body weight: 82.2 kg (181 lb 3.5 oz) which will be used to calculate fluid bolus of 30 ml/kg for treatment of septic shock.      Post- resuscitation assessment Yes Perfusion exam was performed within 6 hours of septic shock presentation after bolus shows Adequate tissue perfusion assessed by non-invasive monitoring       Will Not start Pressors- Levophed for MAP of 65  Source control achieved by: IVF, IV antibiotics    12/9/24:  Continue cefepime/vancomycin, pending sputum susceptibility.   "

## 2024-12-09 NOTE — EICU
Intervention Initiated From:  [facility] / EICU    Willian intervened regarding:  Rounding (Video assessment)    Nurse Notified:  Yes    Doctor Notified:  Yes    Comments: Video rounding completed. Lying on bed in NAD, respirations even and unlabored. Awake and alert. AFIB/RVR on monitor, discussed with bedside RN. Message placed to Dr Mey mahmood/ [facility] for tx.

## 2024-12-09 NOTE — ASSESSMENT & PLAN NOTE
Per patient's family no known follow up for hyperglycemia.   Lab Results   Component Value Date    HGBA1C 8.1 (H) 12/06/2024    HGBA1C 6.4 (H) 11/16/2023      Lab Results   Component Value Date     (H) 12/08/2024     (H) 12/07/2024     (HH) 12/06/2024   Adjust basal insulin and monitor   POC glucose QID and SSI low dose

## 2024-12-09 NOTE — SUBJECTIVE & OBJECTIVE
Interval History: Patient seen and examined.     Review of Systems   Constitutional:  Positive for activity change and fatigue. Negative for appetite change, diaphoresis and fever.   Eyes:  Negative for visual disturbance.   Respiratory:  Positive for cough. Negative for chest tightness, shortness of breath and wheezing.    Cardiovascular:  Negative for chest pain, palpitations and leg swelling.   Gastrointestinal:  Positive for constipation. Negative for abdominal pain, diarrhea, nausea and vomiting.   Musculoskeletal:  Positive for back pain.   Skin:  Negative for color change and wound.   Neurological:  Positive for weakness and headaches. Negative for dizziness, tremors, seizures, speech difficulty and numbness.   Psychiatric/Behavioral:  Negative for agitation, confusion, decreased concentration, dysphoric mood, hallucinations and sleep disturbance. The patient is not nervous/anxious.      Objective:     Vital Signs (Most Recent):  Temp: 98.3 °F (36.8 °C) (12/08/24 1502)  Pulse: (!) 132 (12/08/24 1800)  Resp: (!) 29 (12/08/24 1800)  BP: (!) 117/55 (12/08/24 1800)  SpO2: 95 % (12/08/24 1800) Vital Signs (24h Range):  Temp:  [97.4 °F (36.3 °C)-98.3 °F (36.8 °C)] 98.3 °F (36.8 °C)  Pulse:  [] 132  Resp:  [17-31] 29  SpO2:  [92 %-98 %] 95 %  BP: ()/(53-80) 117/55     Weight: 133.8 kg (295 lb)  Body mass index is 37.88 kg/m².    Intake/Output Summary (Last 24 hours) at 12/8/2024 1811  Last data filed at 12/8/2024 1733  Gross per 24 hour   Intake 4271.88 ml   Output 2600 ml   Net 1671.88 ml         Physical Exam  Vitals and nursing note reviewed. Exam conducted with a chaperone present.   Constitutional:       General: He is not in acute distress.     Appearance: He is ill-appearing. He is not toxic-appearing.   HENT:      Head: Normocephalic.      Nose: Nose normal.      Mouth/Throat:      Mouth: Mucous membranes are dry.      Comments: ET tube in place, OG tube in place  Eyes:      Extraocular  "Movements: Extraocular movements intact.      Conjunctiva/sclera: Conjunctivae normal.   Cardiovascular:      Rate and Rhythm: Tachycardia present. Rhythm irregular.      Heart sounds: No murmur heard.  Pulmonary:      Comments: Vent sounds reaching lung bases bilaterally  Abdominal:      General: There is no distension.      Palpations: Abdomen is soft.      Tenderness: There is no abdominal tenderness. There is no right CVA tenderness, left CVA tenderness or guarding.   Musculoskeletal:      Cervical back: Normal range of motion and neck supple. No rigidity or tenderness.      Right lower leg: No edema.      Left lower leg: No edema.   Lymphadenopathy:      Cervical: No cervical adenopathy.   Skin:     General: Skin is warm and dry.      Capillary Refill: Capillary refill takes less than 2 seconds.   Neurological:      Mental Status: He is oriented to person, place, and time. Mental status is at baseline.      Motor: Weakness present.   Psychiatric:         Mood and Affect: Mood normal.         Behavior: Behavior normal.             Significant Labs: All pertinent labs within the past 24 hours have been reviewed.  Bilirubin:   Recent Labs   Lab 12/06/24  0900 12/07/24  0357 12/08/24  0406   BILITOT 1.4* 1.9* 2.9*     Blood Culture: No results for input(s): "LABBLOO" in the last 48 hours.  BMP:   Recent Labs   Lab 12/08/24  0406   *      K 3.8      CO2 26   BUN 17   CREATININE 1.0   CALCIUM 8.2*   MG 1.7     CBC:   Recent Labs   Lab 12/07/24  0357 12/08/24  0406   WBC 7.91 6.54   HGB 13.8* 13.6*   HCT 37.8* 38.2*   PLT 82* 75*     CMP:   Recent Labs   Lab 12/07/24  0357 12/08/24  0406    143   K 4.4 3.8    109   CO2 23 26   * 212*   BUN 22 17   CREATININE 1.7* 1.0   CALCIUM 8.3* 8.2*   PROT 6.6 6.5   ALBUMIN 2.9* 2.6*   BILITOT 1.9* 2.9*   ALKPHOS 70 70   AST 31 27   ALT 25 24   ANIONGAP 12* 8     Coagulation:   Recent Labs   Lab 12/08/24  1209   INR 1.9*     Lactic Acid: "   Recent Labs   Lab 12/06/24  2038   LACTATE 1.9     Recent Labs   Lab 12/07/24  0357 12/08/24  0406   MG 1.8 1.7     POCT Glucose:   Recent Labs   Lab 12/08/24  0827 12/08/24  1135 12/08/24  1648   POCTGLUCOSE 193* 250* 224*     Recent Labs   Lab 12/07/24  0935   TSH 0.413     Recent Labs   Lab 12/06/24 2014   COLORU Orange*   APPEARANCEUA Cloudy*   PHUR 5.0   SPECGRAV >=1.030*   PROTEINUA Trace*   GLUCUA 4+*   KETONESU Trace*   BILIRUBINUA Negative   OCCULTUA 3+*   NITRITE Negative   UROBILINOGEN Negative   LEUKOCYTESUR Negative   RBCUA 31*   WBCUA 3   BACTERIA Negative   SQUAMEPITHEL 1   HYALINECASTS 1.5*        Significant Imaging: I have reviewed all pertinent imaging results/findings within the past 24 hours.

## 2024-12-09 NOTE — ASSESSMENT & PLAN NOTE
RESOLVED.  Associated lactic acidosis. Multifactorial infectious process, uncontrolled diabetes, acute hypoxia.   Continue with above sepsis management.

## 2024-12-09 NOTE — ASSESSMENT & PLAN NOTE
LEEANN is likely due to pre-renal azotemia due to dehydration. Baseline creatinine is  1 . Most recent creatinine and eGFR are listed below.  Recent Labs     12/06/24  0900 12/07/24  0357 12/08/24  0406   CREATININE 2.0* 1.7* 1.0   EGFRNORACEVR 37.0* 45.0* >60.0        Plan  - LEEANN is resolved  - Avoid nephrotoxins and renally dose meds for GFR listed above  - Monitor urine output, serial BMP, and adjust therapy as needed    - f/u renal ultrasound

## 2024-12-09 NOTE — EICU
e-ICU    Notified fro HR 130s atrial fib.    Pt on oral Amiodarone and Metoprolol    Diltiazem drip was discontinued due to drop in BP    Currently on view pt with a-fib 125, /73    Sleeping, NAD    Will order 2.5 mg metoprolol IV once

## 2024-12-09 NOTE — RESPIRATORY THERAPY
Patient's home CPAP unit cleared for patient use by Sean with [employer]. RT notified by JOSEPH Buckley.

## 2024-12-09 NOTE — ASSESSMENT & PLAN NOTE
Witnessed seizure enroute to ER via EMS.   In the ER patient started on keppra 1500 mg x1.   CT head with no acute intracranial findings, old lacunar infarct suspected of the left basal ganglia. Small remote infarcts also questioned of the left cerebellar hemisphere and right frontal lobe  Likely sources infectious, metabolic  - continue keppra BID  - seizure precautions

## 2024-12-09 NOTE — ASSESSMENT & PLAN NOTE
Patient has paroxysmal (<7 days) atrial fibrillation. Patient is currently in sinus rhythm. [id number] Score: The patient doesn't have any registry metric data available. The patients heart rate in the last 24 hours is as follows:  Pulse  Min: 79  Max: 135     Antiarrhythmics  metoprolol succinate (TOPROL-XL) 24 hr tablet 100 mg, 2 times daily, Oral  amiodarone tablet 100 mg, 2 times daily, Oral    Anticoagulants  warfarin (COUMADIN) tablet 4 mg, Every Sat, Sun, Oral  warfarin tablet 6 mg, Every Mon, Tues, Wed, Thurs, Fri, OralHome warfarin    Plan  - Replete lytes with a goal of K>4, Mg >2  - Patient is anticoagulated, see medications listed above.  - Patient's afib is currently controlled  - continue dilatiazem gtt PRN per cardiology recommendations

## 2024-12-09 NOTE — NURSING
Patient wanted to sit on the side of the bed, he sat up for approximately  20 min, tolerated it well.

## 2024-12-09 NOTE — ASSESSMENT & PLAN NOTE
Patient's blood pressure range in the last 24 hours was: BP  Min: 89/62  Max: 127/92.The patient's inpatient anti-hypertensive regimen is listed below:  Current Antihypertensives  spironolactone tablet 25 mg, 2 times daily, Oral  metoprolol succinate (TOPROL-XL) 24 hr tablet 100 mg, 2 times daily, Oral    Plan  - BP is controlled, no changes needed to their regimen

## 2024-12-09 NOTE — CONSULTS
[facility] - Intensive Care  Adult Nutrition  Consult Note    SUMMARY     Recommendations  1. Rec'd Cardiac Consistent CHO diet.   2. RD provided detailed Diabetic diet education to pt.   3. RD provided detailed Diabetic diet education handout to pt.   4. Rec'd Beneprotein TID.   5. Rec'd ONS: Glucerna TID.    6. RD to follow and make rec's accordingly.  Goals:   1. Pt will continue to consume > 75% of meals by next RD follow up.   2. Pt will be able to list foods that are CHO at next RD follow up.  Nutrition Goal Status: new  Communication of RD Recs: reviewed with RN    Assessment and Plan    Nutrition Problem  Inadequate protein energy intake    Related to (etiology):   Increased nutrient needs    Signs and Symptoms (as evidenced by):   Sepsis dx     Interventions/Recommendations (treatment strategy):  1. Rec'd Cardiac Consistent CHO diet.   2. RD provided detailed Diabetic diet education to pt.   3. RD provided detailed Diabetic diet education handout to pt.   4. Rec'd Beneprotein TID.   5. Rec'd ONS: Glucerna TID.    6. RD to follow and make rec's accordingly.    Nutrition Diagnosis Status:   New    Malnutrition Assessment  NFPE not warranted at this time. RD to continue to monitor for signs and symptoms of malnutrition.    Nutrition Related Social Determinants of Health:  None identified at this time.    Reason for Assessment    Reason For Assessment: consult (Diabetic Diet Education)  Diagnosis: infection/sepsis (Sepsis with encephalopathy without septic shock)  General Information Comments: RD consulted for diabetes diet education. Pt is a new diabetic and upon admission BG >300. Provided pt with detailed diet education and handouts. Pt reports good appeteite and stated he eats % of all meals. RD to follow and make rec's accordingly.  Nutrition Discharge Planning: Cardiac Consistent CHO diet    Nutrition Risk Screen    Nutrition Risk Screen: no indicators present    Nutrition/Diet History    Patient  "Reported Diet/Restrictions/Preferences: general  Spiritual, Cultural Beliefs, Jewish Practices, Values that Affect Care: no  Food Allergies: NKFA    Anthropometrics    Temp: 97.6 °F (36.4 °C)  Height Method: Stated  Height: 6' 2" (188 cm)  Height (inches): 74 in  Weight Method: Bed Scale  Weight: 133.8 kg (294 lb 15.6 oz)  Weight (lb): 294.98 lb  Ideal Body Weight (IBW), Male: 190 lb  % Ideal Body Weight, Male (lb): 155.25 %  BMI (Calculated): 37.9  BMI Grade: 35 - 39.9 - obesity - grade II    Lab/Procedures/Meds    Pertinent Labs Reviewed: reviewed  Pertinent Medications Reviewed: reviewed    Estimated/Assessed Needs    Weight Used For Calorie Calculations: 98 kg (216 lb) (AdjBW)  Energy Calorie Requirements (kcal): 2449 (25kcal/kg AdjBW)  Energy Need Method: Kcal/kg  Protein Requirements: 103-129 (1.2-1.5g/kg IBW)  Weight Used For Protein Calculations: 86.2 kg (190 lb) (IBW)  RDA Method (mL): 2449    Nutrition Prescription Ordered    Current Diet Order: Cardiac Consistent CHO  Oral Nutrition Supplement: None    Evaluation of Received Nutrient/Fluid Intake    % Kcal Needs: 50%  % Protein Needs: 50%  I/O: + 232.8  Energy Calories Required: not meeting needs  Protein Required: not meeting needs  Tolerance: tolerating  % Intake of Estimated Energy Needs: 25 - 50 %  % Meal Intake: 75 - 100 %    Nutrition Risk    Level of Risk/Frequency of Follow-up: moderate     Monitor and Evaluation    Food and Nutrient Intake: energy intake, food and beverage intake  Food and Nutrient Adminstration: diet order  Knowledge/Beliefs/Attitudes: food and nutrition knowledge/skill, beliefs and attitudes  Physical Activity and Function: nutrition-related ADLs and IADLs  Anthropometric Measurements: height/length, weight, weight change, body mass index  Biochemical Data, Medical Tests and Procedures: electrolyte and renal panel, gastrointestinal profile, glucose/endocrine profile, inflammatory profile, lipid profile  Nutrition-Focused " Physical Findings: overall appearance \    Nutrition Follow-Up    RD Follow-up?: Yes

## 2024-12-09 NOTE — SUBJECTIVE & OBJECTIVE
Review of Systems   Constitutional:  Positive for activity change and fatigue. Negative for appetite change, diaphoresis and fever.   Eyes:  Negative for visual disturbance.   Respiratory:  Positive for cough. Negative for chest tightness, shortness of breath and wheezing.    Cardiovascular:  Negative for chest pain, palpitations and leg swelling.   Gastrointestinal:  Positive for constipation. Negative for abdominal pain, diarrhea, nausea and vomiting.   Musculoskeletal:  Positive for back pain.   Skin:  Negative for color change and wound.   Neurological:  Positive for weakness and headaches. Negative for dizziness, tremors, seizures, speech difficulty and numbness.   Psychiatric/Behavioral:  Negative for agitation, confusion, decreased concentration, dysphoric mood, hallucinations and sleep disturbance. The patient is not nervous/anxious.      Objective:     Vital Signs (Most Recent):  Temp: 97 °F (36.1 °C) (12/09/24 0701)  Pulse: (!) 111 (12/09/24 0800)  Resp: (!) 27 (12/09/24 0800)  BP: 113/73 (12/09/24 0800)  SpO2: 96 % (12/09/24 0800) Vital Signs (24h Range):  Temp:  [97 °F (36.1 °C)-98.3 °F (36.8 °C)] 97 °F (36.1 °C)  Pulse:  [] 111  Resp:  [17-31] 27  SpO2:  [89 %-98 %] 96 %  BP: ()/(53-92) 113/73     Weight: 133.8 kg (295 lb)  Body mass index is 37.88 kg/m².    Intake/Output Summary (Last 24 hours) at 12/9/2024 0836  Last data filed at 12/9/2024 0836  Gross per 24 hour   Intake 3402.17 ml   Output 2100 ml   Net 1302.17 ml         Physical Exam  Vitals and nursing note reviewed. Exam conducted with a chaperone present.   Constitutional:       General: He is not in acute distress.     Appearance: He is ill-appearing. He is not toxic-appearing.   HENT:      Head: Normocephalic.      Nose: Nose normal.      Mouth/Throat:      Mouth: Mucous membranes are dry.   Eyes:      Extraocular Movements: Extraocular movements intact.      Conjunctiva/sclera: Conjunctivae normal.   Cardiovascular:       Rate and Rhythm: Tachycardia present. Rhythm irregular.      Heart sounds: No murmur heard.  Pulmonary:      Effort: Pulmonary effort is normal.      Breath sounds: Rhonchi present.   Abdominal:      General: There is no distension.      Palpations: Abdomen is soft.      Tenderness: There is no abdominal tenderness. There is no right CVA tenderness, left CVA tenderness or guarding.   Musculoskeletal:      Cervical back: Normal range of motion and neck supple. No rigidity or tenderness.      Right lower leg: No edema.      Left lower leg: No edema.   Lymphadenopathy:      Cervical: No cervical adenopathy.   Skin:     General: Skin is warm and dry.      Capillary Refill: Capillary refill takes less than 2 seconds.   Neurological:      Mental Status: He is oriented to person, place, and time. Mental status is at baseline.      Motor: Weakness present.   Psychiatric:         Mood and Affect: Mood normal.         Behavior: Behavior normal.             Significant Labs: All pertinent labs within the past 24 hours have been reviewed.    Significant Imaging: I have reviewed all pertinent imaging results/findings within the past 24 hours.

## 2024-12-09 NOTE — PROGRESS NOTES
"[facility] Medicine  Progress Note    Patient Name: Chapo Rosario  MRN: [id number]  Patient Class: IP- Inpatient   Admission Date: 12/6/2024  Length of Stay: 3 days  Attending Physician: Parveen Salazar Jr., MD  Primary Care Provider: Flynn Delarosa MD        Subjective     Principal Problem:Sepsis with encephalopathy without septic shock        HPI:  64 year old with hypertension, hyperlipidemia, obstructive sleep apnea on CPAP, history of bicuspid aortic valve and dilated aortic root s/p mechanical AVR on coumadin was brought in to ER via EMS with following reports;   EMS reports, "Unresponsive with pinpoint pupils upon arrival. Fire depart gave 2 mg of Narcan, no improvement. Last seen normal last night at 2300. 22 gauge left thumb. Narcan 2 mg given IV, started responding to voice. Uncooperative and flailing. History of sepsis. Had seizure en route, became apneic, started bagging. ."  ED course: On arrival vital signs /65 mmHg, , RR 26, SpO2 96% on room air, temp 99.7F.   Patient intubated in ER; ABG following 7.2/40.1/128/15.7 SpO2 99% FiO2 100% on vent. Patient loaded with keppra.  Sepsis work up; WBC 20K, Hg 16.3, HCT 46.9,   Serum Na  139, K 4.3, Cl 102, CO2 12, Glu 474, BUN 18, Cr. 2( baseline 1), AG 25,    Alb 3.8, Tbili 1.4, AST 31, ALT 37, Alk phos 103  Lactate 9.2 à 2.5   Cardiac workup troponin I hs 15.6, NT proBNP 1357, EKG tracings atrial fibrillation, rate 147, non-specific ST-T changes.  Head CT with no acute intracranial findings. CXR with left basilar infiltrates. Urinalysis significant of glucosuria.    Patient received NS bolus x 2.5L followed by continuous /hr. Started on diltiazem gtt. Rocephin 1g.   Patient admitted to ICU with continued management of sepsis, atrial fibrillation with RVR, mechanical ventilation.   Additional history from family: He was last seen out of bed at 7:30AM by his fiance who reports patient went back to " bed. The night before patient's mother accounts after dinner, taking a shower and going to bed around 11 PM. Overnight patient woke up in confusion and urinated in the hamper. Patient had complained of some weakness starting Monday early in the week. EMS was called after patient was shaking in bed and unreponsive at 8AM.   Telemetry tracings normal sinus rhythym rate 108, /62 mmHg, ET tube in place connected to vent AC Vt 600, RR 14, PEEP5, FiO2 70%, SpO2 100%.  Tadeo catheter draining purulent urine, cloudy and pink.  Lactate levels down trending.  Resent urine for urinalysis, continue with broad spectrum abx, continue with IVF /hr.    Overview/Hospital Course:  12/7/24 Tmax overnight 99F. ET tube in place on mechanical vent, sedated on proprofol. ABG 7.41/34.5/114/22.9, 99.2% FiO2 50%. Acidosis improving with gap closing, AM labs remarkable for elevated glucose >300. Leukocytosis resolved. Start glucose control with basal insulin, SSI. Renal function improving and tadeo catheter draining adequately, urine color clearing from milky pink. Continue IVF hydration. Keep broad abx coverage, f/u blood cx results. F/u renal, bladder sonogram for hematuria. Keep Sinus tachy, rate 108-120s bpm. Resume home amiodarone. Follow up cardiology consult. Will wean sedation and transition to CPAP trial for extubation.   12/8/24 Alert and awake, tolerating clear liquid diet. No witnessed seizure overnight. Breathing more comfortably on continuous O2 2L via nasal cannula SpO2 >97%. Tachycardia 110-120s bpm remains after digoxin x1. Will adjust metoprolol and amiodarone and monitor. Preliminary respiratory gram stain Staph aureus, follow up susceptibility studies. Continue with left PNA with cefepime and vancomycin. Adjust basal insulin dosing. Follow up renal ultrasound.   12/9/24:  Patient improving, awake and alert,  Remains tachycardic with atrial fibrillation, cardiology consult in place.  Blood cultures negative to  "date, sputum culture positive for staph aureus and susceptibility pending.  Continue cefepime/vancomycin for now.  Glucose trends >200 on average but improving, continue plan of care for now.  Pending renal ultrasound today.  Electrolytes stable, Corrected calcium at 9.0.  Implement PT/OT today.     No new subjective & objective note has been filed under this hospital service since the last note was generated.      Assessment and Plan     * Sepsis with encephalopathy without septic shock  This patient does have evidence of infective focus  My overall impression is septic shock due to lactate > 4.  Source: Respiratory and Urinary Tract  Antibiotics given-   Antibiotics (72h ago, onward)      Start     Stop Route Frequency Ordered    12/08/24 2030  vancomycin 2 g in 0.9% sodium chloride 500 mL IVPB         -- IV Every 12 hours (non-standard times) 12/08/24 0855    12/06/24 2115  ceFEPIme injection 2 g         -- IV Every 8 hours (non-standard times) 12/06/24 2012 12/06/24 2112  vancomycin - pharmacy to dose  (vancomycin IVPB (PEDS and ADULTS))        Placed in "And" Linked Group    -- IV pharmacy to manage frequency 12/06/24 2013 12/06/24 1300  mupirocin 2 % ointment         12/11/24 0859 Nasl 2 times daily 12/06/24 1149          Latest lactate reviewed- downtrending 9.2-->2.5 --> 1.9  Recent Labs   Lab 12/06/24  2038   LACTATE 1.9       Organ dysfunction indicated by Acute kidney injury, Acute respiratory failure, and Encephalopathy    Fluid challenge Ideal Body Weight- The patient's ideal body weight is Ideal body weight: 82.2 kg (181 lb 3.5 oz) which will be used to calculate fluid bolus of 30 ml/kg for treatment of septic shock.      Post- resuscitation assessment Yes Perfusion exam was performed within 6 hours of septic shock presentation after bolus shows Adequate tissue perfusion assessed by non-invasive monitoring       Will Not start Pressors- Levophed for MAP of 65  Source control achieved by: IVF, IV " "antibiotics    12/9/24:  Continue cefepime/vancomycin, pending sputum susceptibility.     Pneumonia of left lower lobe due to infectious organism  Patient has a diagnosis of pneumonia. The cause of the pneumonia is bacterial in etiology due to staph aureus . The pneumonia is stable. The patient has the following signs/symptoms of pneumonia: cough, sputum production, and shortness of breath. The patient does have a current oxygen requirement and the patient does not have a home oxygen requirement. I have reviewed the pertinent imaging. The following cultures have been collected: Blood cultures and gram stain sputum PCR study  The culture results are listed below.     Current antimicrobial regimen consists of the antibiotics listed below. Will monitor patient closely and continue current treatment plan unchanged.    Antibiotics (From admission, onward)      Start     Stop Route Frequency Ordered    12/08/24 2030  vancomycin 2 g in 0.9% sodium chloride 500 mL IVPB         -- IV Every 12 hours (non-standard times) 12/08/24 0855    12/06/24 2115  ceFEPIme injection 2 g         -- IV Every 8 hours (non-standard times) 12/06/24 2012 12/06/24 2112  vancomycin - pharmacy to dose  (vancomycin IVPB (PEDS and ADULTS))        Placed in "And" Linked Group    -- IV pharmacy to manage frequency 12/06/24 2013 12/06/24 1300  mupirocin 2 % ointment         12/11/24 0859 Nasl 2 times daily 12/06/24 1149            Microbiology Results (last 7 days)       Procedure Component Value Units Date/Time    Blood culture #2 **CANNOT BE ORDERED STAT** [[id number]] Collected: 12/06/24 1033    Order Status: Completed Specimen: Blood from Peripheral, Forearm, Left Updated: 12/08/24 1812     Blood Culture, Routine No Growth to date      No Growth to date      No Growth to date    Blood culture #1 **CANNOT BE ORDERED STAT** [[id number]] Collected: 12/06/24 1023    Order Status: Completed Specimen: Blood from Peripheral, Antecubital, Right " Updated: 12/08/24 1812     Blood Culture, Routine No Growth to date      No Growth to date      No Growth to date    Culture, Respiratory with Gram Stain [[id number]]  (Abnormal) Collected: 12/06/24 1025    Order Status: Completed Specimen: Sputum from Endotracheal Aspirate Updated: 12/08/24 1115     Respiratory Culture STAPHYLOCOCCUS AUREUS  Few  Susceptibility pending  Normal respiratory olena also present       Gram Stain (Respiratory) <10 epithelial cells per low power field.     Gram Stain (Respiratory) Rare WBC's     Gram Stain (Respiratory) Rare Gram positive cocci            Atrial fibrillation with RVR  Patient has paroxysmal (<7 days) atrial fibrillation. Patient is currently in sinus rhythm. [id number] Score: The patient doesn't have any registry metric data available. The patients heart rate in the last 24 hours is as follows:  Pulse  Min: 75  Max: 133     Antiarrhythmics  metoprolol succinate (TOPROL-XL) 24 hr tablet 100 mg, 2 times daily, Oral  amiodarone tablet 100 mg, 2 times daily, Oral    Anticoagulants  warfarin (COUMADIN) tablet 4 mg, Every Sat, Sun, Oral  warfarin tablet 6 mg, Every Mon, Tues, Wed, Thurs, Fri, OralHome warfarin    Plan  - Replete lytes with a goal of K>4, Mg >2  - Patient is anticoagulated, see medications listed above.  - Patient's afib is currently controlled  - continue dilatiazem gtt PRN per cardiology recommendations        Endotracheal tube present  12/7/24 wean sedation, transition to AC to CPAP settings and extubate  12/9/24:  S/p extubation.         Type 2 diabetes mellitus, without long-term current use of insulin  Per patient's family no known follow up for hyperglycemia.   Lab Results   Component Value Date    HGBA1C 8.1 (H) 12/06/2024    HGBA1C 6.4 (H) 11/16/2023      Lab Results   Component Value Date     (H) 12/09/2024     (H) 12/08/2024     (H) 12/07/2024   Adjust basal insulin and monitor   POC glucose QID and SSI low  dose      Encephalopathy, metabolic  RESOLVED.  Associated lactic acidosis. Multifactorial infectious process, uncontrolled diabetes, acute hypoxia.   Continue with above sepsis management.      New onset seizure  Witnessed seizure enroute to ER via EMS.   In the ER patient started on keppra 1500 mg x1.   CT head with no acute intracranial findings, old lacunar infarct suspected of the left basal ganglia. Small remote infarcts also questioned of the left cerebellar hemisphere and right frontal lobe  Likely sources infectious, metabolic  - continue keppra BID  - seizure precautions     12/9/24:  Check levetiracetam level in am.       Hyperlipidemia  Resume home regimen      Primary hypertension  Patient's blood pressure range in the last 24 hours was: BP  Min: 89/62  Max: 127/92.The patient's inpatient anti-hypertensive regimen is listed below:  Current Antihypertensives  spironolactone tablet 25 mg, 2 times daily, Oral  metoprolol succinate (TOPROL-XL) 24 hr tablet 100 mg, 2 times daily, Oral    Plan  - BP is controlled, no changes needed to their regimen      Class 2 severe obesity with serious comorbidity and body mass index (BMI) of 37.0 to 37.9 in adult  Body mass index is 37.88 kg/m². Morbid obesity complicates all aspects of disease management from diagnostic modalities to treatment. Weight loss encouraged and health benefits explained to patient.         LEEANN (acute kidney injury)  LEEANN is likely due to pre-renal azotemia due to dehydration. Baseline creatinine is  1 . Most recent creatinine and eGFR are listed below.  Recent Labs     12/07/24  0357 12/08/24  0406 12/09/24  0728   CREATININE 1.7* 1.0 1.0   EGFRNORACEVR 45.0* >60.0 >60.0        Plan  - LEEANN is resolved  - Avoid nephrotoxins and renally dose meds for GFR listed above  - Monitor urine output, serial BMP, and adjust therapy as needed    12/9/24:  Resolved, renal ultrasound today.       VTE Risk Mitigation (From admission, onward)           Ordered      warfarin tablet 6 mg  Every Mon, Tues, Wed, Thurs, Fri         12/07/24 1405     warfarin (COUMADIN) tablet 4 mg  Every Sat, Sun         12/07/24 1405     Place AINSLEY hose  Until discontinued         12/06/24 1946     IP VTE HIGH RISK PATIENT  Once         12/06/24 1147     Place sequential compression device  Until discontinued         12/06/24 1147                    Discharge Planning   CATAL[place]:      Code Status: Full Code   Medical Readiness for Discharge Date:   Discharge Plan A: Other (TBD)          Time spent in critical care (in addition to E/M time mentioned above) spent on the evaluation and treatment of severe organ dysfunction, review of pertinent labs and imaging studies, discussions with consulting providers and discussions with patient/family: 30  min       Please place Justification for DME        CANDACE Garcia  Department of Hospital Medicine   [place] [facility]

## 2024-12-09 NOTE — RESPIRATORY THERAPY
Notified per JOSEPH Buckley that patient's family brought in patient's home CPAP unit. Sean with [employer] notified per nurse.

## 2024-12-09 NOTE — ASSESSMENT & PLAN NOTE
Per patient's family no known follow up for hyperglycemia.   Lab Results   Component Value Date    HGBA1C 8.1 (H) 12/06/2024    HGBA1C 6.4 (H) 11/16/2023      Lab Results   Component Value Date     (H) 12/09/2024     (H) 12/08/2024     (H) 12/07/2024   Adjust basal insulin and monitor   POC glucose QID and SSI low dose

## 2024-12-09 NOTE — ASSESSMENT & PLAN NOTE
12/7/24 wean sedation, transition to AC to CPAP settings and extubate  12/9/24:  S/p extubation.

## 2024-12-09 NOTE — PLAN OF CARE
Problem: Physical Therapy  Goal: Physical Therapy Goal  Description: Goals to be met by: 2024     Patient will increase functional independence with mobility by performin. Gait  x 300+ feet with Modified [place] using Rolling Walker maintaining SpO2 above 90% and HR no greater than 120 bpm.   2. Stand for 10 minutes with Modified [place] using Rolling Walker    Outcome: Plan of Care Established

## 2024-12-09 NOTE — PT/OT/SLP EVAL
Physical Therapy Evaluation    Patient Name:  Chapo Rosario   MRN:  [id number]    Recommendations:     Discharge Recommendations:  (TBD based on progress closer to time of discharge)   Discharge Equipment Recommendations:  (TBD based on progress closer to time of discharge)   Barriers to discharge:  ongoing medical treatment    Assessment:     Chapo Rosario is a 64 y.o. male admitted with a medical diagnosis of Sepsis with encephalopathy without septic shock.  He presents with the following impairments/functional limitations: weakness, impaired endurance, impaired self care skills, impaired functional mobility, gait instability, impaired balance, decreased coordination, decreased upper extremity function, decreased lower extremity function, decreased safety awareness, decreased ROM, impaired cardiopulmonary response to activity, impaired joint extensibility, impaired muscle length. These limitations are causing decreased functional mobility and independence and increased caregiver burden.     Patient found supine in ICU with multiple lines. He tolerated ambulation this date with a RW and CGA-supervision secondary to balance deficits and general BLE weakness. Patient has poor cardiopulmonary response to activity. HR fluctuating during ambulation between 100-130 bpm. Patient felt dizzy during ambulation and needed to rest, returned to hospital room and patient sat EOB. SpO2 maintained between 89-95% on room air. Diastolic BP after ambulation 100 mmHg. Patient rested in supine for several minutes until BP recovered. Patient then needed to have a BM. Ambulated to and from bathroom with no assistance needed for toileting tasks or toilet transfers. Patient's VSS following treatment. Nurse notified of fluctuating cardiopulmonary response to activity. Progress per patient tolerance monitoring VS during activity to reach maximum level of function.     Rehab Prognosis: Fair; patient would benefit from acute skilled PT services  "to address these deficits and reach maximum level of function.    Recent Surgery: * No surgery found *      Plan:     During this hospitalization, patient to be seen 5 x/week to address the identified rehab impairments via gait training, therapeutic activities, therapeutic exercises, neuromuscular re-education and progress toward the following goals:    Plan of Care Expires:  12/16/24    Subjective     Chief Complaint: "I am feeling dizzy, lets go back to the bed"  Patient/Family Comments/goals: "I don't like this thing on my wiener" (referring to tadeo cath)  Pain/Comfort:  Pain Rating 1: 0/10    Patients cultural, spiritual, Lutheran conflicts given the current situation: no    Living Environment:  Patient lives with his mom and girlfriend in a H with no steps to enter.   Prior to admission, patients level of function was independent.  Equipment used at home: none (Has RW at home).  DME owned (not currently used): rolling walker.  Upon discharge, patient will have assistance from girlfriend.    Objective:     Communicated with nurse and patient prior to session.  Patient found supine with telemetry, SCD, pulse ox (continuous), peripheral IV, tadeo catheter, blood pressure cuff  upon PT entry to room.    General Precautions: Standard, fall  Orthopedic Precautions:N/A   Braces: N/A  Respiratory Status: room air    Vitals:   Prior to treatment (supine) During treatment (sitting) After treatment (supine)   /66 mmHg 132/100 mmHg, then 116/95 mmHg after supine for 3 min 147/88 mmHg    bpm 134 bpm 133 bpm   SpO2 (room air) 95% 89-95% 94%       Exams:  RLE ROM: WFL  RLE Strength: WFL  LLE ROM: WFL  LLE Strength: WFL    Functional Mobility:  Bed Mobility:     Rolling Left:  modified independence  Rolling Right: modified independence  Scooting: modified independence  Bridging: modified independence  Supine to Sit: modified independence  Sit to Supine: modified independence  Transfers:     Sit to Stand:  " supervision with rolling walker  Toilet Transfer: supervision with  rolling walker  using  Step Transfer  Gait: ~125 ft and then ~15 ft twice with RW and CGA-supervision  Balance: supervision-SBA standing with RW, independent sitting EOB      AM-PAC 6 CLICK MOBILITY  Total Score:21       Treatment & Education:  Bed mobility   Rolling B sides x2   Scooting at EOB x4   Bridging x5   Supine<>sit x4  Transfers  STS x5  Toilet transfer x2  Gait   Ambulation training with RW and CGA-supervision  Balance   Sitting EOB weight shifting   Standing with RW weight shifting  Education    Patient educated on AD use and safety, role of acute care PT, POC, importance of OOB mobility, transfer safety, and call bell usage.    Patient left HOB elevated with all lines intact, call button in reach, and nurse present.    GOALS:   Multidisciplinary Problems       Physical Therapy Goals          Problem: Physical Therapy    Goal Priority Disciplines Outcome Interventions   Physical Therapy Goal     PT, PT/OT Progressing    Description: Goals to be met by: 2024     Patient will increase functional independence with mobility by performin. Gait  x 300+ feet with Modified [place] using Rolling Walker maintaining SpO2 above 90% and HR no greater than 120 bpm.   2. Stand for 10 minutes with Modified [place] using Rolling Walker                         History:     Past Medical History:   Diagnosis Date    LEEANN (acute kidney injury) 2023    Anticoagulant long-term use     Aortic aneurysm     Atrial fibrillation     COVID-19 vaccine administered     Group D streptococcal infection     Hyperlipidemia     Hypertension     Pneumonia, unspecified organism     Severe sepsis 2018    Sleep apnea     Thrombocytopenia 2023    VHD (valvular heart disease)        Past Surgical History:   Procedure Laterality Date    ABLATION N/A 2024    Procedure: Afib Ablation;  Surgeon: Hung Stacy MD;  Location: [facility];   Service: Cardiology;  Laterality: N/A;  ops # 5    aortic valve replacement      APPENDECTOMY      CARDIAC SURGERY  2013    MECHANICAL AVR    CHOLECYSTECTOMY      COLONOSCOPY N/A 2/14/2024    Procedure: COLONOSCOPY;  Surgeon: Ta Flowers MD;  Location: [facility];  Service: Endoscopy;  Laterality: N/A;    CORONARY ANGIOGRAPHY      ECHOCARDIOGRAM, TRANSESOPHAGEAL      ESOPHAGOGASTRODUODENOSCOPY N/A 2/14/2024    Procedure: EGD (ESOPHAGOGASTRODUODENOSCOPY);  Surgeon: Ta Flowers MD;  Location: [facility];  Service: Endoscopy;  Laterality: N/A;    NECK SURGERY      TRANSESOPHAGEAL ECHOCARDIOGRAPHY N/A 11/17/2023    Procedure: ECHOCARDIOGRAM, TRANSESOPHAGEAL;  Surgeon: Antonio Ortiz MD;  Location: [facility];  Service: Cardiovascular;  Laterality: N/A;    TREATMENT OF CARDIAC ARRHYTHMIA N/A 11/17/2023    Procedure: Cardioversion or Defibrillation;  Surgeon: Antonio Ortiz MD;  Location: [facility];  Service: Cardiovascular;  Laterality: N/A;       Time Tracking:     PT Received On: 12/09/24  PT Start Time: 0900     PT Stop Time: 1000  PT Total Time (min): 60 min     Billable Minutes: Evaluation 15, Gait Training 15, Therapeutic Activity 15, and Neuromuscular Re-education 15      12/09/2024

## 2024-12-09 NOTE — PLAN OF CARE
Recommendations  1. Rec'd Cardiac Consistent CHO diet.   2. RD provided detailed Diabetic diet education to pt.   3. RD provided detailed Diabetic diet education handout to pt.   4. Rec'd Beneprotein TID.   5. Rec'd ONS: Glucerna TID.    6. RD to follow and make rec's accordingly.  Goals:   1. Pt will continue to consume > 75% of meals by next RD follow up.   2. Pt will be able to list foods that are CHO at next RD follow up.  Nutrition Goal Status: new

## 2024-12-09 NOTE — PLAN OF CARE
Patient rested well last night. VSS. The doctor increased the metoprolol and amiodarone . Patient's heart rate decreased from the 130's to the 70's. Patient's has no complaints of shortness of breathe. He has been weaned off the O2. O2 was placed on patient because his O2 sat was 88-90%. Patient states he has a cpap at home he sleeps with but didn't bring it. Patient states he feels good just weak, noted.

## 2024-12-09 NOTE — ASSESSMENT & PLAN NOTE
LEEANN is likely due to pre-renal azotemia due to dehydration. Baseline creatinine is  1 . Most recent creatinine and eGFR are listed below.  Recent Labs     12/07/24  0357 12/08/24  0406 12/09/24  0728   CREATININE 1.7* 1.0 1.0   EGFRNORACEVR 45.0* >60.0 >60.0        Plan  - LEEANN is resolved  - Avoid nephrotoxins and renally dose meds for GFR listed above  - Monitor urine output, serial BMP, and adjust therapy as needed    12/9/24:  Resolved, renal ultrasound today.

## 2024-12-09 NOTE — PLAN OF CARE
No acute events noted during shift. No fever, vitals stable but HR does get elevated at times into 130's. Notified Niya NP with Dr Salazar and orders received for this. Stevens d/c today, pt voided using urinal post removal. Appetite good. Worked with PT/OT and ambulated in hallway with walker, tolerated well. Had small BM today.

## 2024-12-09 NOTE — PROGRESS NOTES
"[facility] Medicine  Progress Note    Patient Name: Chapo Rosario  MRN: [id number]  Patient Class: IP- Inpatient   Admission Date: 12/6/2024  Length of Stay: 2 days  Attending Physician: Parveen Salazar Jr., MD  Primary Care Provider: Flynn Delarosa MD        Subjective     Principal Problem:Sepsis with encephalopathy without septic shock        HPI:  64 year old with hypertension, hyperlipidemia, obstructive sleep apnea on CPAP, history of bicuspid aortic valve and dilated aortic root s/p mechanical AVR on coumadin was brought in to ER via EMS with following reports;   EMS reports, "Unresponsive with pinpoint pupils upon arrival. Fire depart gave 2 mg of Narcan, no improvement. Last seen normal last night at 2300. 22 gauge left thumb. Narcan 2 mg given IV, started responding to voice. Uncooperative and flailing. History of sepsis. Had seizure en route, became apneic, started bagging. ."  ED course: On arrival vital signs /65 mmHg, , RR 26, SpO2 96% on room air, temp 99.7F.   Patient intubated in ER; ABG following 7.2/40.1/128/15.7 SpO2 99% FiO2 100% on vent. Patient loaded with keppra.  Sepsis work up; WBC 20K, Hg 16.3, HCT 46.9,   Serum Na  139, K 4.3, Cl 102, CO2 12, Glu 474, BUN 18, Cr. 2( baseline 1), AG 25,    Alb 3.8, Tbili 1.4, AST 31, ALT 37, Alk phos 103  Lactate 9.2 à 2.5   Cardiac workup troponin I hs 15.6, NT proBNP 1357, EKG tracings atrial fibrillation, rate 147, non-specific ST-T changes.  Head CT with no acute intracranial findings. CXR with left basilar infiltrates. Urinalysis significant of glucosuria.    Patient received NS bolus x 2.5L followed by continuous /hr. Started on diltiazem gtt. Rocephin 1g.   Patient admitted to ICU with continued management of sepsis, atrial fibrillation with RVR, mechanical ventilation.   Additional history from family: He was last seen out of bed at 7:30AM by his fiance who reports patient went back to " bed. The night before patient's mother accounts after dinner, taking a shower and going to bed around 11 PM. Overnight patient woke up in confusion and urinated in the hamper. Patient had complained of some weakness starting Monday early in the week. EMS was called after patient was shaking in bed and unreponsive at 8AM.   Telemetry tracings normal sinus rhythym rate 108, /62 mmHg, ET tube in place connected to vent AC Vt 600, RR 14, PEEP5, FiO2 70%, SpO2 100%.  Tadeo catheter draining purulent urine, cloudy and pink.  Lactate levels down trending.  Resent urine for urinalysis, continue with broad spectrum abx, continue with IVF /hr.    Overview/Hospital Course:  12/7/24 Tmax overnight 99F. ET tube in place on mechanical vent, sedated on proprofol. ABG 7.41/34.5/114/22.9, 99.2% FiO2 50%. Acidosis improving with gap closing, AM labs remarkable for elevated glucose >300. Leukocytosis resolved. Start glucose control with basal insulin, SSI. Renal function improving and tadeo catheter draining adequately, urine color clearing from milky pink. Continue IVF hydration. Keep broad abx coverage, f/u blood cx results. F/u renal, bladder sonogram for hematuria. Keep Sinus tachy, rate 108-120s bpm. Resume home amiodarone. Follow up cardiology consult. Will wean sedation and transition to CPAP trial for extubation.   12/8/24 Alert and awake, tolerating clear liquid diet. No witnessed seizure overnight. Breathing more comfortably on continuous O2 2L via nasal cannula SpO2 >97%. Tachycardia 110-120s bpm remains after digoxin x1. Will adjust metoprolol and amiodarone and monitor. Preliminary respiratory gram stain Staph aureus, follow up susceptibility studies. Continue with left PNA with cefepime and vancomycin. Adjust basal insulin dosing. Follow up renal ultrasound.     Interval History: Patient seen and examined.     Review of Systems   Constitutional:  Positive for activity change and fatigue. Negative for appetite  change, diaphoresis and fever.   Eyes:  Negative for visual disturbance.   Respiratory:  Positive for cough. Negative for chest tightness, shortness of breath and wheezing.    Cardiovascular:  Negative for chest pain, palpitations and leg swelling.   Gastrointestinal:  Positive for constipation. Negative for abdominal pain, diarrhea, nausea and vomiting.   Musculoskeletal:  Positive for back pain.   Skin:  Negative for color change and wound.   Neurological:  Positive for weakness and headaches. Negative for dizziness, tremors, seizures, speech difficulty and numbness.   Psychiatric/Behavioral:  Negative for agitation, confusion, decreased concentration, dysphoric mood, hallucinations and sleep disturbance. The patient is not nervous/anxious.      Objective:     Vital Signs (Most Recent):  Temp: 98.3 °F (36.8 °C) (12/08/24 1502)  Pulse: (!) 132 (12/08/24 1800)  Resp: (!) 29 (12/08/24 1800)  BP: (!) 117/55 (12/08/24 1800)  SpO2: 95 % (12/08/24 1800) Vital Signs (24h Range):  Temp:  [97.4 °F (36.3 °C)-98.3 °F (36.8 °C)] 98.3 °F (36.8 °C)  Pulse:  [] 132  Resp:  [17-31] 29  SpO2:  [92 %-98 %] 95 %  BP: ()/(53-80) 117/55     Weight: 133.8 kg (295 lb)  Body mass index is 37.88 kg/m².    Intake/Output Summary (Last 24 hours) at 12/8/2024 1811  Last data filed at 12/8/2024 1733  Gross per 24 hour   Intake 4271.88 ml   Output 2600 ml   Net 1671.88 ml         Physical Exam  Vitals and nursing note reviewed. Exam conducted with a chaperone present.   Constitutional:       General: He is not in acute distress.     Appearance: He is ill-appearing. He is not toxic-appearing.   HENT:      Head: Normocephalic.      Nose: Nose normal.      Mouth/Throat:      Mouth: Mucous membranes are dry.      Comments: ET tube in place, OG tube in place  Eyes:      Extraocular Movements: Extraocular movements intact.      Conjunctiva/sclera: Conjunctivae normal.   Cardiovascular:      Rate and Rhythm: Tachycardia present. Rhythm  "irregular.      Heart sounds: No murmur heard.  Pulmonary:      Comments: Vent sounds reaching lung bases bilaterally  Abdominal:      General: There is no distension.      Palpations: Abdomen is soft.      Tenderness: There is no abdominal tenderness. There is no right CVA tenderness, left CVA tenderness or guarding.   Musculoskeletal:      Cervical back: Normal range of motion and neck supple. No rigidity or tenderness.      Right lower leg: No edema.      Left lower leg: No edema.   Lymphadenopathy:      Cervical: No cervical adenopathy.   Skin:     General: Skin is warm and dry.      Capillary Refill: Capillary refill takes less than 2 seconds.   Neurological:      Mental Status: He is oriented to person, place, and time. Mental status is at baseline.      Motor: Weakness present.   Psychiatric:         Mood and Affect: Mood normal.         Behavior: Behavior normal.             Significant Labs: All pertinent labs within the past 24 hours have been reviewed.  Bilirubin:   Recent Labs   Lab 12/06/24  0900 12/07/24  0357 12/08/24  0406   BILITOT 1.4* 1.9* 2.9*     Blood Culture: No results for input(s): "LABBLOO" in the last 48 hours.  BMP:   Recent Labs   Lab 12/08/24  0406   *      K 3.8      CO2 26   BUN 17   CREATININE 1.0   CALCIUM 8.2*   MG 1.7     CBC:   Recent Labs   Lab 12/07/24  0357 12/08/24  0406   WBC 7.91 6.54   HGB 13.8* 13.6*   HCT 37.8* 38.2*   PLT 82* 75*     CMP:   Recent Labs   Lab 12/07/24  0357 12/08/24  0406    143   K 4.4 3.8    109   CO2 23 26   * 212*   BUN 22 17   CREATININE 1.7* 1.0   CALCIUM 8.3* 8.2*   PROT 6.6 6.5   ALBUMIN 2.9* 2.6*   BILITOT 1.9* 2.9*   ALKPHOS 70 70   AST 31 27   ALT 25 24   ANIONGAP 12* 8     Coagulation:   Recent Labs   Lab 12/08/24  1209   INR 1.9*     Lactic Acid:   Recent Labs   Lab 12/06/24  2038   LACTATE 1.9     Recent Labs   Lab 12/07/24  0357 12/08/24  0406   MG 1.8 1.7     POCT Glucose:   Recent Labs   Lab " "12/08/24  0827 12/08/24  1135 12/08/24  1648   POCTGLUCOSE 193* 250* 224*     Recent Labs   Lab 12/07/24  0935   TSH 0.413     Recent Labs   Lab 12/06/24 2014   COLORU Orange*   APPEARANCEUA Cloudy*   PHUR 5.0   SPECGRAV >=1.030*   PROTEINUA Trace*   GLUCUA 4+*   KETONESU Trace*   BILIRUBINUA Negative   OCCULTUA 3+*   NITRITE Negative   UROBILINOGEN Negative   LEUKOCYTESUR Negative   RBCUA 31*   WBCUA 3   BACTERIA Negative   SQUAMEPITHEL 1   HYALINECASTS 1.5*        Significant Imaging: I have reviewed all pertinent imaging results/findings within the past 24 hours.    Assessment and Plan     * Sepsis with encephalopathy without septic shock  This patient does have evidence of infective focus  My overall impression is septic shock due to lactate > 4.  Source: Respiratory and Urinary Tract  Antibiotics given-   Antibiotics (72h ago, onward)      Start     Stop Route Frequency Ordered    12/08/24 2030  vancomycin 2 g in 0.9% sodium chloride 500 mL IVPB         -- IV Every 12 hours (non-standard times) 12/08/24 0855    12/06/24 2115  ceFEPIme injection 2 g         -- IV Every 8 hours (non-standard times) 12/06/24 2012 12/06/24 2112  vancomycin - pharmacy to dose  (vancomycin IVPB (PEDS and ADULTS))        Placed in "And" Linked Group    -- IV pharmacy to manage frequency 12/06/24 2013 12/06/24 1300  mupirocin 2 % ointment         12/11/24 0859 Nasl 2 times daily 12/06/24 1149          Latest lactate reviewed- downtrending 9.2-->2.5 --> 1.9  Recent Labs   Lab 12/06/24  2038   LACTATE 1.9       Organ dysfunction indicated by Acute kidney injury, Acute respiratory failure, and Encephalopathy    Fluid challenge Ideal Body Weight- The patient's ideal body weight is Ideal body weight: 82.2 kg (181 lb 3.5 oz) which will be used to calculate fluid bolus of 30 ml/kg for treatment of septic shock.      Post- resuscitation assessment Yes Perfusion exam was performed within 6 hours of septic shock presentation after bolus " "shows Adequate tissue perfusion assessed by non-invasive monitoring       Will Not start Pressors- Levophed for MAP of 65  Source control achieved by: IVF, IV antibiotics    Pneumonia of left lower lobe due to infectious organism  Patient has a diagnosis of pneumonia. The cause of the pneumonia is bacterial in etiology due to staph aureus . The pneumonia is stable. The patient has the following signs/symptoms of pneumonia: cough, sputum production, and shortness of breath. The patient does have a current oxygen requirement and the patient does not have a home oxygen requirement. I have reviewed the pertinent imaging. The following cultures have been collected: Blood cultures and gram stain sputum PCR study  The culture results are listed below.     Current antimicrobial regimen consists of the antibiotics listed below. Will monitor patient closely and continue current treatment plan unchanged.    Antibiotics (From admission, onward)      Start     Stop Route Frequency Ordered    12/08/24 2030  vancomycin 2 g in 0.9% sodium chloride 500 mL IVPB         -- IV Every 12 hours (non-standard times) 12/08/24 0855    12/06/24 2115  ceFEPIme injection 2 g         -- IV Every 8 hours (non-standard times) 12/06/24 2012 12/06/24 2112  vancomycin - pharmacy to dose  (vancomycin IVPB (PEDS and ADULTS))        Placed in "And" Linked Group    -- IV pharmacy to manage frequency 12/06/24 2013 12/06/24 1300  mupirocin 2 % ointment         12/11/24 0859 Nasl 2 times daily 12/06/24 1149            Microbiology Results (last 7 days)       Procedure Component Value Units Date/Time    Blood culture #2 **CANNOT BE ORDERED STAT** [[id number]] Collected: 12/06/24 1033    Order Status: Completed Specimen: Blood from Peripheral, Forearm, Left Updated: 12/08/24 1812     Blood Culture, Routine No Growth to date      No Growth to date      No Growth to date    Blood culture #1 **CANNOT BE ORDERED STAT** [[id number]] Collected: 12/06/24 1023 "    Order Status: Completed Specimen: Blood from Peripheral, Antecubital, Right Updated: 12/08/24 1812     Blood Culture, Routine No Growth to date      No Growth to date      No Growth to date    Culture, Respiratory with Gram Stain [[id number]]  (Abnormal) Collected: 12/06/24 1025    Order Status: Completed Specimen: Sputum from Endotracheal Aspirate Updated: 12/08/24 1115     Respiratory Culture STAPHYLOCOCCUS AUREUS  Few  Susceptibility pending  Normal respiratory olena also present       Gram Stain (Respiratory) <10 epithelial cells per low power field.     Gram Stain (Respiratory) Rare WBC's     Gram Stain (Respiratory) Rare Gram positive cocci            Atrial fibrillation with RVR  Patient has paroxysmal (<7 days) atrial fibrillation. Patient is currently in sinus rhythm. [id number] Score: The patient doesn't have any registry metric data available. The patients heart rate in the last 24 hours is as follows:  Pulse  Min: 79  Max: 135     Antiarrhythmics  metoprolol succinate (TOPROL-XL) 24 hr tablet 100 mg, 2 times daily, Oral  amiodarone tablet 100 mg, 2 times daily, Oral    Anticoagulants  warfarin (COUMADIN) tablet 4 mg, Every Sat, Sun, Oral  warfarin tablet 6 mg, Every Mon, Tues, Wed, Thurs, Fri, OralHome warfarin    Plan  - Replete lytes with a goal of K>4, Mg >2  - Patient is anticoagulated, see medications listed above.  - Patient's afib is currently controlled  - continue dilatiazem gtt PRN per cardiology recommendations        Endotracheal tube present  12/7/24 wean sedation, transition to AC to CPAP settings and extubate        Hyperglycemia  Per patient's family no known follow up for hyperglycemia.   Lab Results   Component Value Date    HGBA1C 8.1 (H) 12/06/2024    HGBA1C 6.4 (H) 11/16/2023      Lab Results   Component Value Date     (H) 12/08/2024     (H) 12/07/2024     (HH) 12/06/2024   Adjust basal insulin and monitor   POC glucose QID and SSI low  dose      Encephalopathy, metabolic  RESOLVED.  Associated lactic acidosis. Multifactorial infectious process, uncontrolled diabetes, acute hypoxia.   Continue with above sepsis management.      New onset seizure  Witnessed seizure enroute to ER via EMS.   In the ER patient started on keppra 1500 mg x1.   CT head with no acute intracranial findings, old lacunar infarct suspected of the left basal ganglia. Small remote infarcts also questioned of the left cerebellar hemisphere and right frontal lobe  Likely sources infectious, metabolic  - continue keppra BID  - seizure precautions         Hyperlipidemia  Resume home regimen      Class 2 severe obesity with serious comorbidity and body mass index (BMI) of 37.0 to 37.9 in adult  Body mass index is 37.88 kg/m². Morbid obesity complicates all aspects of disease management from diagnostic modalities to treatment. Weight loss encouraged and health benefits explained to patient.         LEEANN (acute kidney injury)  LEEANN is likely due to pre-renal azotemia due to dehydration. Baseline creatinine is  1 . Most recent creatinine and eGFR are listed below.  Recent Labs     12/06/24  0900 12/07/24  0357 12/08/24  0406   CREATININE 2.0* 1.7* 1.0   EGFRNORACEVR 37.0* 45.0* >60.0        Plan  - LEEANN is resolved  - Avoid nephrotoxins and renally dose meds for GFR listed above  - Monitor urine output, serial BMP, and adjust therapy as needed    - f/u renal ultrasound      VTE Risk Mitigation (From admission, onward)           Ordered     warfarin tablet 6 mg  Every Mon, Tues, Wed, Thurs, Fri         12/07/24 1405     warfarin (COUMADIN) tablet 4 mg  Every Sat, Sun         12/07/24 1405     Place AINSLEY hose  Until discontinued         12/06/24 1946     IP VTE HIGH RISK PATIENT  Once         12/06/24 1147     Place sequential compression device  Until discontinued         12/06/24 1147                    Discharge Planning   [place]:      Code Status: Full Code   Medical Readiness for Discharge  Date:   Discharge Plan A: Other (TBD)                        Joon Roemro DO  Department of Hospital Medicine   [place] [facility]

## 2024-12-10 VITALS
OXYGEN SATURATION: 96 % | WEIGHT: 295 LBS | SYSTOLIC BLOOD PRESSURE: 121 MMHG | TEMPERATURE: 97 F | HEART RATE: 73 BPM | HEIGHT: 74 IN | DIASTOLIC BLOOD PRESSURE: 64 MMHG | BODY MASS INDEX: 37.86 KG/M2 | RESPIRATION RATE: 22 BRPM

## 2024-12-10 PROBLEM — E83.42 HYPOMAGNESEMIA: Status: ACTIVE | Noted: 2024-12-10

## 2024-12-10 LAB
ALBUMIN SERPL BCP-MCNC: 2.5 G/DL (ref 3.5–5.2)
ALP SERPL-CCNC: 88 U/L (ref 55–135)
ALT SERPL W/O P-5'-P-CCNC: 26 U/L (ref 10–44)
ANION GAP SERPL CALC-SCNC: 8 MMOL/L (ref 3–11)
AST SERPL-CCNC: 36 U/L (ref 10–40)
BASOPHILS # BLD AUTO: 0.07 K/UL (ref 0–0.2)
BASOPHILS NFR BLD: 1.1 % (ref 0–1.9)
BILIRUB SERPL-MCNC: 2.4 MG/DL (ref 0.1–1)
BUN SERPL-MCNC: 16 MG/DL (ref 8–23)
CALCIUM SERPL-MCNC: 8.3 MG/DL (ref 8.7–10.5)
CHLORIDE SERPL-SCNC: 110 MMOL/L (ref 95–110)
CO2 SERPL-SCNC: 24 MMOL/L (ref 23–29)
CREAT SERPL-MCNC: 0.9 MG/DL (ref 0.5–1.4)
DIFFERENTIAL METHOD BLD: ABNORMAL
EOSINOPHIL # BLD AUTO: 0.2 K/UL (ref 0–0.5)
EOSINOPHIL NFR BLD: 3 % (ref 0–8)
ERYTHROCYTE [DISTWIDTH] IN BLOOD BY AUTOMATED COUNT: 13.2 % (ref 11.5–14.5)
EST. GFR  (NO RACE VARIABLE): >60 ML/MIN/1.73 M^2
GLUCOSE SERPL-MCNC: 166 MG/DL (ref 70–110)
HCT VFR BLD AUTO: 38 % (ref 40–54)
HGB BLD-MCNC: 14 G/DL (ref 14–18)
IMM GRANULOCYTES # BLD AUTO: 0.03 K/UL (ref 0–0.04)
IMM GRANULOCYTES NFR BLD AUTO: 0.5 % (ref 0–0.5)
INR PPP: 1.9 (ref 0.8–1.2)
LYMPHOCYTES # BLD AUTO: 2.8 K/UL (ref 1–4.8)
LYMPHOCYTES NFR BLD: 43.5 % (ref 18–48)
MAGNESIUM SERPL-MCNC: 1.5 MG/DL (ref 1.6–2.6)
MCH RBC QN AUTO: 35.6 PG (ref 27–31)
MCHC RBC AUTO-ENTMCNC: 36.8 G/DL (ref 32–36)
MCV RBC AUTO: 97 FL (ref 82–98)
MONOCYTES # BLD AUTO: 0.5 K/UL (ref 0.3–1)
MONOCYTES NFR BLD: 7.6 % (ref 4–15)
NEUTROPHILS # BLD AUTO: 2.8 K/UL (ref 1.8–7.7)
NEUTROPHILS NFR BLD: 44.3 % (ref 38–73)
NRBC BLD-RTO: 0 /100 WBC
PLATELET # BLD AUTO: 111 K/UL (ref 150–450)
PMV BLD AUTO: 9.4 FL (ref 9.2–12.9)
POTASSIUM SERPL-SCNC: 3.5 MMOL/L (ref 3.5–5.1)
PROT SERPL-MCNC: 6.3 G/DL (ref 6–8.4)
PROTHROMBIN TIME: 19 SEC (ref 9–12.5)
RBC # BLD AUTO: 3.93 M/UL (ref 4.6–6.2)
SODIUM SERPL-SCNC: 142 MMOL/L (ref 136–145)
VANCOMYCIN TROUGH SERPL-MCNC: 13.9 UG/ML (ref 10–22)
WBC # BLD AUTO: 6.41 K/UL (ref 3.9–12.7)

## 2024-12-10 PROCEDURE — 25000003 PHARM REV CODE 250: Performed by: STUDENT IN AN ORGANIZED HEALTH CARE EDUCATION/TRAINING PROGRAM

## 2024-12-10 PROCEDURE — 99900031 HC PATIENT EDUCATION (STAT)

## 2024-12-10 PROCEDURE — 85610 PROTHROMBIN TIME: CPT | Performed by: NURSE PRACTITIONER

## 2024-12-10 PROCEDURE — 36415 COLL VENOUS BLD VENIPUNCTURE: CPT | Performed by: INTERNAL MEDICINE

## 2024-12-10 PROCEDURE — 94761 N-INVAS EAR/PLS OXIMETRY MLT: CPT

## 2024-12-10 PROCEDURE — 63600175 PHARM REV CODE 636 W HCPCS: Performed by: STUDENT IN AN ORGANIZED HEALTH CARE EDUCATION/TRAINING PROGRAM

## 2024-12-10 PROCEDURE — 36415 COLL VENOUS BLD VENIPUNCTURE: CPT | Performed by: NURSE PRACTITIONER

## 2024-12-10 PROCEDURE — 80177 DRUG SCRN QUAN LEVETIRACETAM: CPT | Performed by: NURSE PRACTITIONER

## 2024-12-10 PROCEDURE — 83735 ASSAY OF MAGNESIUM: CPT | Performed by: STUDENT IN AN ORGANIZED HEALTH CARE EDUCATION/TRAINING PROGRAM

## 2024-12-10 PROCEDURE — 25000003 PHARM REV CODE 250: Performed by: NURSE PRACTITIONER

## 2024-12-10 PROCEDURE — 99900035 HC TECH TIME PER 15 MIN (STAT)

## 2024-12-10 PROCEDURE — 80202 ASSAY OF VANCOMYCIN: CPT | Performed by: INTERNAL MEDICINE

## 2024-12-10 PROCEDURE — 80053 COMPREHEN METABOLIC PANEL: CPT | Performed by: STUDENT IN AN ORGANIZED HEALTH CARE EDUCATION/TRAINING PROGRAM

## 2024-12-10 PROCEDURE — 85025 COMPLETE CBC W/AUTO DIFF WBC: CPT | Performed by: STUDENT IN AN ORGANIZED HEALTH CARE EDUCATION/TRAINING PROGRAM

## 2024-12-10 RX ORDER — LANOLIN ALCOHOL/MO/W.PET/CERES
400 CREAM (GRAM) TOPICAL ONCE
Status: COMPLETED | OUTPATIENT
Start: 2024-12-10 | End: 2024-12-10

## 2024-12-10 RX ORDER — INSULIN GLARGINE 100 [IU]/ML
20 INJECTION, SOLUTION SUBCUTANEOUS NIGHTLY
Qty: 3 ML | Refills: 1 | Status: SHIPPED | OUTPATIENT
Start: 2024-12-10 | End: 2025-12-10

## 2024-12-10 RX ORDER — SULFAMETHOXAZOLE AND TRIMETHOPRIM 800; 160 MG/1; MG/1
2 TABLET ORAL 2 TIMES DAILY
Qty: 28 TABLET | Refills: 0 | Status: SHIPPED | OUTPATIENT
Start: 2024-12-10

## 2024-12-10 RX ORDER — LEVETIRACETAM 500 MG/1
500 TABLET ORAL 2 TIMES DAILY
Qty: 60 TABLET | Refills: 11 | Status: SHIPPED | OUTPATIENT
Start: 2024-12-10 | End: 2025-12-10

## 2024-12-10 RX ORDER — CLINDAMYCIN HYDROCHLORIDE 300 MG/1
300 CAPSULE ORAL 3 TIMES DAILY
Qty: 21 CAPSULE | Refills: 0 | Status: SHIPPED | OUTPATIENT
Start: 2024-12-10

## 2024-12-10 RX ORDER — GUAIFENESIN 600 MG/1
600 TABLET, EXTENDED RELEASE ORAL 2 TIMES DAILY
Qty: 20 TABLET | Refills: 0 | Status: SHIPPED | OUTPATIENT
Start: 2024-12-10 | End: 2024-12-20

## 2024-12-10 RX ORDER — INSULIN PUMP SYRINGE, 3 ML
EACH MISCELLANEOUS
Qty: 1 EACH | Refills: 0 | Status: SHIPPED | OUTPATIENT
Start: 2024-12-10 | End: 2025-12-10

## 2024-12-10 RX ADMIN — SPIRONOLACTONE 25 MG: 25 TABLET ORAL at 08:12

## 2024-12-10 RX ADMIN — GUAIFENESIN 600 MG: 600 TABLET, EXTENDED RELEASE ORAL at 08:12

## 2024-12-10 RX ADMIN — AMIODARONE HYDROCHLORIDE 100 MG: 100 TABLET ORAL at 08:12

## 2024-12-10 RX ADMIN — METOPROLOL SUCCINATE 100 MG: 100 TABLET, EXTENDED RELEASE ORAL at 08:12

## 2024-12-10 RX ADMIN — CEFEPIME 2 G: 2 INJECTION, POWDER, FOR SOLUTION INTRAVENOUS at 05:12

## 2024-12-10 RX ADMIN — Medication 400 MG: at 08:12

## 2024-12-10 RX ADMIN — LEVETIRACETAM 500 MG: 500 TABLET, FILM COATED ORAL at 08:12

## 2024-12-10 RX ADMIN — INSULIN GLARGINE 36 UNITS: 100 INJECTION, SOLUTION SUBCUTANEOUS at 08:12

## 2024-12-10 RX ADMIN — MUPIROCIN: 20 OINTMENT TOPICAL at 08:12

## 2024-12-10 NOTE — ASSESSMENT & PLAN NOTE
"Patient has a diagnosis of pneumonia. The cause of the pneumonia is bacterial in etiology due to staph aureus . The pneumonia is stable. The patient has the following signs/symptoms of pneumonia: cough, sputum production, and shortness of breath. The patient does have a current oxygen requirement and the patient does not have a home oxygen requirement. I have reviewed the pertinent imaging. The following cultures have been collected: Blood cultures and gram stain sputum PCR study  The culture results are listed below.     Current antimicrobial regimen consists of the antibiotics listed below. Will monitor patient closely and continue current treatment plan unchanged.    Antibiotics (From admission, onward)      Start     Stop Route Frequency Ordered    12/08/24 2030  vancomycin 2 g in 0.9% sodium chloride 500 mL IVPB         -- IV Every 12 hours (non-standard times) 12/08/24 0855    12/06/24 2115  ceFEPIme injection 2 g         -- IV Every 8 hours (non-standard times) 12/06/24 2012 12/06/24 2112  vancomycin - pharmacy to dose  (vancomycin IVPB (PEDS and ADULTS))        Placed in "And" Linked Group    -- IV pharmacy to manage frequency 12/06/24 2013 12/06/24 1300  mupirocin 2 % ointment         12/11/24 0859 Nasl 2 times daily 12/06/24 1149            Microbiology Results (last 7 days)       Procedure Component Value Units Date/Time    Blood culture #2 **CANNOT BE ORDERED STAT** [[id number]] Collected: 12/06/24 1033    Order Status: Completed Specimen: Blood from Peripheral, Forearm, Left Updated: 12/09/24 1812     Blood Culture, Routine No Growth to date      No Growth to date      No Growth to date      No Growth to date    Blood culture #1 **CANNOT BE ORDERED STAT** [[id number]] Collected: 12/06/24 1023    Order Status: Completed Specimen: Blood from Peripheral, Antecubital, Right Updated: 12/09/24 1812     Blood Culture, Routine No Growth to date      No Growth to date      No Growth to date      No Growth " to date    Culture, Respiratory with Gram Stain [[id number]]  (Abnormal)  (Susceptibility) Collected: 12/06/24 1025    Order Status: Completed Specimen: Sputum from Endotracheal Aspirate Updated: 12/09/24 1116     Respiratory Culture METHICILLIN RESISTANT STAPHYLOCOCCUS AUREUS  Few  Normal respiratory olena also present       Gram Stain (Respiratory) <10 epithelial cells per low power field.     Gram Stain (Respiratory) Rare WBC's     Gram Stain (Respiratory) Rare Gram positive cocci          12/10/2024 discharge with Bactrim and clindamycin combo.

## 2024-12-10 NOTE — PLAN OF CARE
Problem: Adult Inpatient Plan of Care  Goal: Plan of Care Review  Outcome: Progressing     Problem: Adult Inpatient Plan of Care  Goal: Patient-Specific Goal (Individualized)  Outcome: Progressing     Problem: Adult Inpatient Plan of Care  Goal: Absence of Hospital-Acquired Illness or Injury  Outcome: Progressing     Problem: Adult Inpatient Plan of Care  Goal: Optimal Comfort and Wellbeing  Outcome: Progressing     Problem: Adult Inpatient Plan of Care  Goal: Readiness for Transition of Care  Outcome: Progressing     Problem: Fall Injury Risk  Goal: Absence of Fall and Fall-Related Injury  Outcome: Progressing     Problem: Skin Injury Risk Increased  Goal: Skin Health and Integrity  Outcome: Progressing     Problem: Sepsis/Septic Shock  Goal: Optimal Coping  Outcome: Progressing     Problem: Sepsis/Septic Shock  Goal: Blood Glucose Level Within Targeted Range  Outcome: Progressing     Problem: Sepsis/Septic Shock  Goal: Absence of Infection Signs and Symptoms  Outcome: Progressing     Problem: Sepsis/Septic Shock  Goal: Optimal Nutrition Intake  Outcome: Progressing     Problem: Acute Kidney Injury/Impairment  Goal: Fluid and Electrolyte Balance  Outcome: Progressing     Problem: Acute Kidney Injury/Impairment  Goal: Improved Oral Intake  Outcome: Progressing     Problem: Acute Kidney Injury/Impairment  Goal: Effective Renal Function  Outcome: Progressing     Problem: Diabetes Comorbidity  Goal: Blood Glucose Level Within Targeted Range  Outcome: Progressing

## 2024-12-10 NOTE — CARE UPDATE
Heart rate well controlled overnight. Improved after IV Cardizem bolus.   Agree with Toprol XL and Amiodarone as dosed.   Stable for discharge, plan for hospital follow up with Dr. Ortiz on 12/19/24.

## 2024-12-10 NOTE — ASSESSMENT & PLAN NOTE
Patient's blood pressure range in the last 24 hours was: BP  Min: 96/64  Max: 157/94.The patient's inpatient anti-hypertensive regimen is listed below:  Current Antihypertensives  spironolactone tablet 25 mg, 2 times daily, Oral  metoprolol succinate (TOPROL-XL) 24 hr tablet 100 mg, 2 times daily, Oral  diltiaZEM injection 10 mg, Every 8 hours PRN, Intravenous    Plan  - BP is controlled, no changes needed to their regimen

## 2024-12-10 NOTE — PT/OT/SLP DISCHARGE
Physical Therapy Discharge Summary    Name: Chapo Rosario  MRN: [id number]   Principal Problem: Sepsis with encephalopathy without septic shock     Patient Discharged from acute Physical Therapy on 12/10/2024.  Please refer to prior PT note dated  2024  for functional status.     Assessment:     Patient was discharged unexpectedly.  Information required to complete an accurate discharge summary is unknown.  Refer to therapy initial evaluation and last progress note for initial and most recent functional status and goal achievement.  Recommendations made may be found in medical record.    Objective:     GOALS:   Multidisciplinary Problems       Physical Therapy Goals          Problem: Physical Therapy    Goal Priority Disciplines Outcome Interventions   Physical Therapy Goal     PT, PT/OT Adequate for Care Transition    Description: Goals to be met by: 2024     Patient will increase functional independence with mobility by performin. Gait  x 300+ feet with Modified Mitchell using Rolling Walker maintaining SpO2 above 90% and HR no greater than 120 bpm.   2. Stand for 10 minutes with Modified Mitchell using Rolling Walker                         Reasons for Discontinuation of Therapy Services  Transfer to alternate level of care.      Plan:     Patient Discharged to: Home no PT services needed.      12/10/2024

## 2024-12-10 NOTE — DISCHARGE SUMMARY
"[facility] Medicine  Discharge Summary      Patient Name: Chapo Rosario  MRN: [id number]  LEYLA: [id number]  Patient Class: IP- Inpatient  Admission Date: 12/6/2024  Hospital Length of Stay: 4 days  Discharge Date and Time:  12/10/2024 8:19 AM  Attending Physician: Parveen Salazar Jr., MD   Discharging Provider: CANDACE Garcia  Primary Care Provider: Flynn Delarosa MD    Primary Care Team: Networked reference to record PCT     HPI:   64 year old with hypertension, hyperlipidemia, obstructive sleep apnea on CPAP, history of bicuspid aortic valve and dilated aortic root s/p mechanical AVR on coumadin was brought in to ER via EMS with following reports;   EMS reports, "Unresponsive with pinpoint pupils upon arrival. Fire depart gave 2 mg of Narcan, no improvement. Last seen normal last night at 2300. 22 gauge left thumb. Narcan 2 mg given IV, started responding to voice. Uncooperative and flailing. History of sepsis. Had seizure en route, became apneic, started bagging. ."  ED course: On arrival vital signs /65 mmHg, , RR 26, SpO2 96% on room air, temp 99.7F.   Patient intubated in ER; ABG following 7.2/40.1/128/15.7 SpO2 99% FiO2 100% on vent. Patient loaded with keppra.  Sepsis work up; WBC 20K, Hg 16.3, HCT 46.9,   Serum Na  139, K 4.3, Cl 102, CO2 12, Glu 474, BUN 18, Cr. 2( baseline 1), AG 25,    Alb 3.8, Tbili 1.4, AST 31, ALT 37, Alk phos 103  Lactate 9.2 à 2.5   Cardiac workup troponin I hs 15.6, NT proBNP 1357, EKG tracings atrial fibrillation, rate 147, non-specific ST-T changes.  Head CT with no acute intracranial findings. CXR with left basilar infiltrates. Urinalysis significant of glucosuria.    Patient received NS bolus x 2.5L followed by continuous /hr. Started on diltiazem gtt. Rocephin 1g.   Patient admitted to ICU with continued management of sepsis, atrial fibrillation with RVR, mechanical ventilation.   Additional history from family: He " was last seen out of bed at 7:30AM by his fiance who reports patient went back to bed. The night before patient's mother accounts after dinner, taking a shower and going to bed around 11 PM. Overnight patient woke up in confusion and urinated in the hamper. Patient had complained of some weakness starting Monday early in the week. EMS was called after patient was shaking in bed and unreponsive at 8AM.   Telemetry tracings normal sinus rhythym rate 108, /62 mmHg, ET tube in place connected to vent AC Vt 600, RR 14, PEEP5, FiO2 70%, SpO2 100%.  Tadeo catheter draining purulent urine, cloudy and pink.  Lactate levels down trending.  Resent urine for urinalysis, continue with broad spectrum abx, continue with IVF /hr.    * No surgery found *      Hospital Course:   12/7/24 Tmax overnight 99F. ET tube in place on mechanical vent, sedated on proprofol. ABG 7.41/34.5/114/22.9, 99.2% FiO2 50%. Acidosis improving with gap closing, AM labs remarkable for elevated glucose >300. Leukocytosis resolved. Start glucose control with basal insulin, SSI. Renal function improving and tadeo catheter draining adequately, urine color clearing from milky pink. Continue IVF hydration. Keep broad abx coverage, f/u blood cx results. F/u renal, bladder sonogram for hematuria. Keep Sinus tachy, rate 108-120s bpm. Resume home amiodarone. Follow up cardiology consult. Will wean sedation and transition to CPAP trial for extubation.   12/8/24 Alert and awake, tolerating clear liquid diet. No witnessed seizure overnight. Breathing more comfortably on continuous O2 2L via nasal cannula SpO2 >97%. Tachycardia 110-120s bpm remains after digoxin x1. Will adjust metoprolol and amiodarone and monitor. Preliminary respiratory gram stain Staph aureus, follow up susceptibility studies. Continue with left PNA with cefepime and vancomycin. Adjust basal insulin dosing. Follow up renal ultrasound.   12/9/24:  Patient improving, awake and alert,   "Remains tachycardic with atrial fibrillation, cardiology consult in place.  Blood cultures negative to date, sputum culture positive for staph aureus and susceptibility pending.  Continue cefepime/vancomycin for now.  Glucose trends >200 on average but improving, continue plan of care for now.  Pending renal ultrasound today.  Electrolytes stable, Corrected calcium at 9.0.  Implement PT/OT today.   12/10/2024:  Patient clinically much improved.  Patient required IV bolus dosing of Cardizem yesterday for AFib RVR with rates of 130.  He had a great response.  Cardiology had seen patient on yesterday and recommends continuing current amiodarone and Toprol dosing at the time of discharge.  We will continue with Bactrim and clindamycin for coverage of staph pneumonia at the time of discharge.  We will need close follow up with Cardiology for INR monitoring with dual antibiotic therapy.     Goals of Care Treatment Preferences:  Code Status: Full Code      SDOH Screening:  The patient was unable to be screened for utility difficulties, food insecurity, transport difficulties, housing insecurity, and interpersonal safety, so no concerns could be identified this admission.     Consults:   Consults (From admission, onward)          Status Ordering Provider     Inpatient consult to Registered Dietitian/Nutritionist  Once        Provider:  (Not yet assigned)    Completed SCOTTY SINGLETON     Pharmacy to dose Vancomycin consult  Once        Provider:  (Not yet assigned)   Placed in "And" Linked Group    Acknowledged SCOTTY SINGLETON     Cardiology  Once        Provider:  Donny Cordero, [facility]    Acknowledged NICK HEWITT            * Sepsis with encephalopathy without septic shock  This patient does have evidence of infective focus  My overall impression is septic shock due to lactate > 4.  Source: Respiratory and Urinary Tract  Antibiotics given-   Antibiotics (72h ago, onward)      Start     Stop Route " "Frequency Ordered    12/08/24 2030  vancomycin 2 g in 0.9% sodium chloride 500 mL IVPB         -- IV Every 12 hours (non-standard times) 12/08/24 0855    12/06/24 2115  ceFEPIme injection 2 g         -- IV Every 8 hours (non-standard times) 12/06/24 2012 12/06/24 2112  vancomycin - pharmacy to dose  (vancomycin IVPB (PEDS and ADULTS))        Placed in "And" Linked Group    -- IV pharmacy to manage frequency 12/06/24 2013 12/06/24 1300  mupirocin 2 % ointment         12/11/24 0859 Nasl 2 times daily 12/06/24 1149          Latest lactate reviewed- downtrending 9.2-->2.5 --> 1.9  No results for input(s): "LACTATE", "POCLAC" in the last 72 hours.    Organ dysfunction indicated by Acute kidney injury, Acute respiratory failure, and Encephalopathy    Fluid challenge Ideal Body Weight- The patient's ideal body weight is Ideal body weight: 82.2 kg (181 lb 3.5 oz) which will be used to calculate fluid bolus of 30 ml/kg for treatment of septic shock.      Post- resuscitation assessment Yes Perfusion exam was performed within 6 hours of septic shock presentation after bolus shows Adequate tissue perfusion assessed by non-invasive monitoring       Will Not start Pressors- Levophed for MAP of 65  Source control achieved by: IVF, IV antibiotics    12/9/24:  Continue cefepime/vancomycin, pending sputum susceptibility.     12/10/2024:  We will discharge home with Bactrim and clindamycin.  Blood cultures negative today.    Hypomagnesemia  Patient has Abnormal Magnesium: hypomagnesemia. Will continue to monitor electrolytes closely. Will replace the affected electrolytes and repeat labs to be done after interventions completed. The patient's magnesium results have been reviewed and are listed below.  Recent Labs   Lab 12/10/24  0406   MG 1.5*      12/10/2024 we will give magnesium supplement before discharge.    Pneumonia of left lower lobe due to infectious organism  Patient has a diagnosis of pneumonia. The cause of the " "pneumonia is bacterial in etiology due to staph aureus . The pneumonia is stable. The patient has the following signs/symptoms of pneumonia: cough, sputum production, and shortness of breath. The patient does have a current oxygen requirement and the patient does not have a home oxygen requirement. I have reviewed the pertinent imaging. The following cultures have been collected: Blood cultures and gram stain sputum PCR study  The culture results are listed below.     Current antimicrobial regimen consists of the antibiotics listed below. Will monitor patient closely and continue current treatment plan unchanged.    Antibiotics (From admission, onward)      Start     Stop Route Frequency Ordered    12/08/24 2030  vancomycin 2 g in 0.9% sodium chloride 500 mL IVPB         -- IV Every 12 hours (non-standard times) 12/08/24 0855    12/06/24 2115  ceFEPIme injection 2 g         -- IV Every 8 hours (non-standard times) 12/06/24 2012 12/06/24 2112  vancomycin - pharmacy to dose  (vancomycin IVPB (PEDS and ADULTS))        Placed in "And" Linked Group    -- IV pharmacy to manage frequency 12/06/24 2013 12/06/24 1300  mupirocin 2 % ointment         12/11/24 0859 Nasl 2 times daily 12/06/24 1149            Microbiology Results (last 7 days)       Procedure Component Value Units Date/Time    Blood culture #2 **CANNOT BE ORDERED STAT** [[id number]] Collected: 12/06/24 1033    Order Status: Completed Specimen: Blood from Peripheral, Forearm, Left Updated: 12/09/24 1812     Blood Culture, Routine No Growth to date      No Growth to date      No Growth to date      No Growth to date    Blood culture #1 **CANNOT BE ORDERED STAT** [[id number]] Collected: 12/06/24 1023    Order Status: Completed Specimen: Blood from Peripheral, Antecubital, Right Updated: 12/09/24 1812     Blood Culture, Routine No Growth to date      No Growth to date      No Growth to date      No Growth to date    Culture, Respiratory with Gram Stain " [[id number]]  (Abnormal)  (Susceptibility) Collected: 12/06/24 1025    Order Status: Completed Specimen: Sputum from Endotracheal Aspirate Updated: 12/09/24 1116     Respiratory Culture METHICILLIN RESISTANT STAPHYLOCOCCUS AUREUS  Few  Normal respiratory olena also present       Gram Stain (Respiratory) <10 epithelial cells per low power field.     Gram Stain (Respiratory) Rare WBC's     Gram Stain (Respiratory) Rare Gram positive cocci          12/10/2024 discharge with Bactrim and clindamycin combo.    Atrial fibrillation with RVR  Patient has paroxysmal (<7 days) atrial fibrillation. Patient is currently in sinus rhythm. [id number] Score: 1. The patients heart rate in the last 24 hours is as follows:  Pulse  Min: 67  Max: 130     Antiarrhythmics  metoprolol succinate (TOPROL-XL) 24 hr tablet 100 mg, 2 times daily, Oral  amiodarone tablet 100 mg, 2 times daily, Oral  diltiaZEM injection 10 mg, Every 8 hours PRN, Intravenous    Anticoagulants  warfarin (COUMADIN) tablet 4 mg, Every Sat, Sun, Oral  warfarin tablet 6 mg, Every Mon, Tues, Wed, Thurs, Fri, OralHome warfarin    Plan  - Replete lytes with a goal of K>4, Mg >2  - Patient is anticoagulated, see medications listed above.  - Patient's afib is currently controlled  - continue dilatiazem gtt PRN per cardiology recommendations    12/10/2024:  Utilize dose if p.r.n. diltiazem yesterday for AFib RVR.  Heart rate has much improved.  We will discharge home with amiodarone and Toprol combo.  We will need follow up with Cardiology after discharge.  We will also need close monitoring and INR with antibiotic administration.      Endotracheal tube present  12/7/24 wean sedation, transition to AC to CPAP settings and extubate  12/9/24:  S/p extubation.         Type 2 diabetes mellitus, without long-term current use of insulin  Per patient's family no known follow up for hyperglycemia.   Lab Results   Component Value Date    HGBA1C 8.1 (H) 12/06/2024    HGBA1C 6.4 (H)  11/16/2023      Lab Results   Component Value Date     (H) 12/10/2024     (H) 12/09/2024     (H) 12/08/2024   Adjust basal insulin and monitor   POC glucose QID and SSI low dose    Discharge home with basal insulin.  F/u with primary care provider regarding further treatment of DMII. Send diabetic supplies to pharmacy for patient.         Encephalopathy, metabolic  RESOLVED.  Associated lactic acidosis. Multifactorial infectious process, uncontrolled diabetes, acute hypoxia.   Continue with above sepsis management.      New onset seizure  Witnessed seizure enroute to ER via EMS.   In the ER patient started on keppra 1500 mg x1.   CT head with no acute intracranial findings, old lacunar infarct suspected of the left basal ganglia. Small remote infarcts also questioned of the left cerebellar hemisphere and right frontal lobe  Likely sources infectious, metabolic  - continue keppra BID  - seizure precautions     12/9/24:  Check levetiracetam level in am.       Hyperlipidemia  Resume home regimen      Primary hypertension  Patient's blood pressure range in the last 24 hours was: BP  Min: 96/64  Max: 157/94.The patient's inpatient anti-hypertensive regimen is listed below:  Current Antihypertensives  spironolactone tablet 25 mg, 2 times daily, Oral  metoprolol succinate (TOPROL-XL) 24 hr tablet 100 mg, 2 times daily, Oral  diltiaZEM injection 10 mg, Every 8 hours PRN, Intravenous    Plan  - BP is controlled, no changes needed to their regimen      Class 2 severe obesity with serious comorbidity and body mass index (BMI) of 37.0 to 37.9 in adult  Body mass index is 37.87 kg/m². Morbid obesity complicates all aspects of disease management from diagnostic modalities to treatment. Weight loss encouraged and health benefits explained to patient.         LEEANN (acute kidney injury)  LEEANN is likely due to pre-renal azotemia due to dehydration. Baseline creatinine is  1 . Most recent creatinine and eGFR are  listed below.  Recent Labs     12/08/24  0406 12/09/24  0728 12/10/24  0406   CREATININE 1.0 1.0 0.9   EGFRNORACEVR >60.0 >60.0 >60.0        Plan  - LEEANN is resolved  - Avoid nephrotoxins and renally dose meds for GFR listed above  - Monitor urine output, serial BMP, and adjust therapy as needed    12/9/24:  Resolved, renal ultrasound today.       Final Active Diagnoses:    Diagnosis Date Noted POA    PRINCIPAL PROBLEM:  Sepsis with encephalopathy without septic shock [A41.9, R65.20, G93.41] 08/30/2018 Yes    Hypomagnesemia [E83.42] 12/10/2024 Unknown    Pneumonia of left lower lobe due to infectious organism [J18.9] 12/07/2024 Yes    New onset seizure [R56.9] 12/06/2024 Yes    Encephalopathy, metabolic [G93.41] 12/06/2024 Yes    Type 2 diabetes mellitus, without long-term current use of insulin [E11.9] 12/06/2024 Yes    Endotracheal tube present [Z97.8] 12/06/2024 Yes    Atrial fibrillation with RVR [I48.91] 12/06/2024 Yes    Class 2 severe obesity with serious comorbidity and body mass index (BMI) of 37.0 to 37.9 in adult [E66.812, Z68.37, E66.01] 12/02/2023 Not Applicable    Hyperlipidemia [E78.5] 11/30/2023 Yes    Primary hypertension [I10] 11/30/2023 Yes    LEEANN (acute kidney injury) [N17.9] 11/13/2023 Yes      Problems Resolved During this Admission:       Discharged Condition: good    Disposition: Home or Self Care    Follow Up:   Follow-up Information       Flynn Delarosa MD Follow up in 3 day(s).    Specialty: Internal Medicine  Contact information:  [address]  [facility] [address]  [phone]               Antonio Ortiz MD Follow up in 1 week(s).    Specialty: Cardiology  Contact information:  [address] [address]  [phone]                           Patient Instructions:      Diet Cardiac       Significant Diagnostic Studies: Labs: All labs within the past 24 hours have been reviewed  Microbiology: Blood Culture   Lab Results   Component Value Date     LABBLOO No Growth to date 12/06/2024    LABBLOO No Growth to date 12/06/2024    LABBLOO No Growth to date 12/06/2024    LABBLOO No Growth to date 12/06/2024    and Sputum Culture   Lab Results   Component Value Date    GSRESP <10 epithelial cells per low power field. 12/06/2024    GSRESP Rare WBC's 12/06/2024    GSRESP Rare Gram positive cocci 12/06/2024    RESPIRATORYC (A) 12/06/2024     METHICILLIN RESISTANT STAPHYLOCOCCUS AUREUS  Few  Normal respiratory olena also present         Pending Diagnostic Studies:       Procedure Component Value Units Date/Time    Levetiracetam Level [[id number]] Collected: 12/10/24 0406    Order Status: Sent Lab Status: No result     Specimen: Blood     Urinalysis, Reflex to Urine Culture Urine, Clean Catch [[id number]] Collected: 12/06/24 2003    Order Status: Sent Lab Status: In process Updated: 12/06/24 2120    Specimen: Urine, Catheterized     VANCOMYCIN, TROUGH [[id number]] Collected: 12/10/24 0758    Order Status: Sent Lab Status: No result     Specimen: Blood            Medications:  Reconciled Home Medications:      Medication List        START taking these medications      blood sugar diagnostic Strp  1 strip by Misc.(Non-Drug; Combo Route) route 2 (two) times a day.     blood-glucose meter kit  Use as instructed     clindamycin 300 MG capsule  Commonly known as: CLEOCIN  Take 1 capsule (300 mg total) by mouth 3 (three) times daily.     guaiFENesin 600 mg 12 hr tablet  Commonly known as: MUCINEX  Take 1 tablet (600 mg total) by mouth 2 (two) times daily. for 10 days     insulin glargine U-100 (Lantus) 100 unit/mL (3 mL) Inpn pen  Inject 20 Units into the skin every evening.     lancets 31 gauge Misc  1 lancet  by Misc.(Non-Drug; Combo Route) route 2 (two) times a day.     levETIRAcetam 500 MG Tab  Commonly known as: KEPPRA  Take 1 tablet (500 mg total) by mouth 2 (two) times daily.     sulfamethoxazole-trimethoprim 800-160mg 800-160 mg Tab  Commonly known as: BACTRIM DS  Take  2 tablets by mouth 2 (two) times daily.            CHANGE how you take these medications      amiodarone 200 MG Tab  Commonly known as: PACERONE  Take 0.5 tablets (100 mg total) by mouth once daily.  What changed: when to take this     furosemide 40 MG tablet  Commonly known as: LASIX  If increased SOB or weight gain greater or equal to 3lbs in 24 hours increase lasix to 40mg daily x 3 days then resume 20mg daily  What changed:   how much to take  how to take this  when to take this  additional instructions     potassium chloride SA 20 MEQ tablet  Commonly known as: K-DUR,KLOR-CON  Take 2 tablets (40 mEq total) by mouth once daily.  What changed:   how much to take  when to take this     warfarin 4 MG tablet  Commonly known as: COUMADIN  Take 1 tablet (4 mg total) by mouth Daily. Take 4 mg orally once daily except 6 mg orally on Mondays only or as directed by [facility].  What changed: additional instructions            CONTINUE taking these medications      aspirin 81 MG EC tablet  Commonly known as: ECOTRIN  Take 81 mg by mouth once daily.     famotidine 20 MG tablet  Commonly known as: PEPCID  Take 1 tablet (20 mg total) by mouth 2 (two) times daily.     fenofibrate micronized 134 MG Cap  Commonly known as: LOFIBRA  Take 134 mg by mouth daily with breakfast.     magnesium oxide 400 mg (241.3 mg magnesium) tablet  Commonly known as: MAG-OX  Take 1 tablet (400 mg total) by mouth 2 (two) times daily.     metoprolol succinate 25 MG 24 hr tablet  Commonly known as: TOPROL-XL  Take 2 tablets (50 mg total) by mouth 2 (two) times daily.     pantoprazole 40 MG tablet  Commonly known as: PROTONIX  Take 1 tablet twice daily 30 min before meals for 2 weeks then Once daily before breakfast for 4 weeks     senna-docusate 8.6-50 mg 8.6-50 mg per tablet  Commonly known as: PERICOLACE  Take 2 tablets by mouth 2 (two) times daily as needed for Constipation.     spironolactone 25 MG tablet  Commonly known as: ALDACTONE  Take 1 tablet (25  mg total) by mouth 2 (two) times daily.     tadalafiL 5 MG tablet  Commonly known as: CIALIS  Take 5 mg by mouth daily as needed for Erectile Dysfunction.     VITAMIN D3 125 mcg (5,000 unit) Tab  Generic drug: cholecalciferol (vitamin D3)  Take 5,000 Units by mouth once daily.              Indwelling Lines/Drains at time of discharge:   Lines/Drains/Airways       None                   Time spent on the discharge of patient: 35 minutes         CANDACE Garcia  Department of Hospital Medicine  [place] [facility]

## 2024-12-10 NOTE — ASSESSMENT & PLAN NOTE
Patient has paroxysmal (<7 days) atrial fibrillation. Patient is currently in sinus rhythm. [id number] Score: 1. The patients heart rate in the last 24 hours is as follows:  Pulse  Min: 67  Max: 130     Antiarrhythmics  metoprolol succinate (TOPROL-XL) 24 hr tablet 100 mg, 2 times daily, Oral  amiodarone tablet 100 mg, 2 times daily, Oral  diltiaZEM injection 10 mg, Every 8 hours PRN, Intravenous    Anticoagulants  warfarin (COUMADIN) tablet 4 mg, Every Sat, Sun, Oral  warfarin tablet 6 mg, Every Mon, Tues, Wed, Thurs, Fri, OralHome warfarin    Plan  - Replete lytes with a goal of K>4, Mg >2  - Patient is anticoagulated, see medications listed above.  - Patient's afib is currently controlled  - continue dilatiazem gtt PRN per cardiology recommendations    12/10/2024:  Utilize dose if p.r.n. diltiazem yesterday for AFib RVR.  Heart rate has much improved.  We will discharge home with amiodarone and Toprol combo.  We will need follow up with Cardiology after discharge.  We will also need close monitoring and INR with antibiotic administration.

## 2024-12-10 NOTE — NURSING
Pt rested off and on. No c/o voiced. Wore CPap during the night. Voided without difficulty, odorous urine. Fluids encouraged. Productive cough, yellow and thick, small amounts. No resp distress, VSS see flow sheets. Contact precautions maintained. Glucose monitoring. Insulin as needed. Abt therapy as ordered. Amb to the BR with standby assist. Tolerated well. No c/o lightheaded or dizziness. Slight delay of response during conversations, the proceeds without hesitation. AM care. Continue to observe.

## 2024-12-10 NOTE — EICU
Intervention Initiated From:  [facility] / EICU    Willian intervened regarding:  Rounding (Video assessment)    Nurse Notified:  No    Doctor Notified:  No    Comments: Video rounding completed. Lying in bed in NAD, respirations even and unlabored. HR 73, VSS. NAD.

## 2024-12-10 NOTE — PLAN OF CARE
Problem: Physical Therapy  Goal: Physical Therapy Goal  Description: Goals to be met by: 2024     Patient will increase functional independence with mobility by performin. Gait  x 300+ feet with Modified [place] using Rolling Walker maintaining SpO2 above 90% and HR no greater than 120 bpm.   2. Stand for 10 minutes with Modified [place] using Rolling Walker    Outcome: Adequate for Care Transition, discharge to home, no treatment provided, just seen for initial  evaluation

## 2024-12-10 NOTE — ASSESSMENT & PLAN NOTE
LEEANN is likely due to pre-renal azotemia due to dehydration. Baseline creatinine is  1 . Most recent creatinine and eGFR are listed below.  Recent Labs     12/08/24  0406 12/09/24  0728 12/10/24  0406   CREATININE 1.0 1.0 0.9   EGFRNORACEVR >60.0 >60.0 >60.0        Plan  - LEEANN is resolved  - Avoid nephrotoxins and renally dose meds for GFR listed above  - Monitor urine output, serial BMP, and adjust therapy as needed    12/9/24:  Resolved, renal ultrasound today.

## 2024-12-10 NOTE — ASSESSMENT & PLAN NOTE
"This patient does have evidence of infective focus  My overall impression is septic shock due to lactate > 4.  Source: Respiratory and Urinary Tract  Antibiotics given-   Antibiotics (72h ago, onward)      Start     Stop Route Frequency Ordered    12/08/24 2030  vancomycin 2 g in 0.9% sodium chloride 500 mL IVPB         -- IV Every 12 hours (non-standard times) 12/08/24 0855    12/06/24 2115  ceFEPIme injection 2 g         -- IV Every 8 hours (non-standard times) 12/06/24 2012 12/06/24 2112  vancomycin - pharmacy to dose  (vancomycin IVPB (PEDS and ADULTS))        Placed in "And" Linked Group    -- IV pharmacy to manage frequency 12/06/24 2013 12/06/24 1300  mupirocin 2 % ointment         12/11/24 0859 Nasl 2 times daily 12/06/24 1149          Latest lactate reviewed- downtrending 9.2-->2.5 --> 1.9  No results for input(s): "LACTATE", "POCLAC" in the last 72 hours.    Organ dysfunction indicated by Acute kidney injury, Acute respiratory failure, and Encephalopathy    Fluid challenge Ideal Body Weight- The patient's ideal body weight is Ideal body weight: 82.2 kg (181 lb 3.5 oz) which will be used to calculate fluid bolus of 30 ml/kg for treatment of septic shock.      Post- resuscitation assessment Yes Perfusion exam was performed within 6 hours of septic shock presentation after bolus shows Adequate tissue perfusion assessed by non-invasive monitoring       Will Not start Pressors- Levophed for MAP of 65  Source control achieved by: IVF, IV antibiotics    12/9/24:  Continue cefepime/vancomycin, pending sputum susceptibility.     12/10/2024:  We will discharge home with Bactrim and clindamycin.  Blood cultures negative today.  "

## 2024-12-10 NOTE — ASSESSMENT & PLAN NOTE
Patient has Abnormal Magnesium: hypomagnesemia. Will continue to monitor electrolytes closely. Will replace the affected electrolytes and repeat labs to be done after interventions completed. The patient's magnesium results have been reviewed and are listed below.  Recent Labs   Lab 12/10/24  0406   MG 1.5*      12/10/2024 we will give magnesium supplement before discharge.

## 2024-12-10 NOTE — PROGRESS NOTES
Pharmacokinetic Assessment Follow Up: IV Vancomycin    Vancomycin serum concentration assessment(s):    The trough level was drawn correctly and can be used to guide therapy at this time. The measurement is within the desired definitive target range of 10 to 20 mcg/mL.    Vancomycin Regimen Plan:    Continue regimen to Vancomycin 2000 mg IV every 12 hours with next serum trough concentration measured at 1930 prior to 4th dose on 12/11/2024    Drug levels (last 3 results):  Recent Labs   Lab Result Units 12/08/24  0741 12/10/24  0758   Vancomycin-Trough ug/mL 3.6* 13.9       Pharmacy will continue to follow and monitor vancomycin.    Please contact pharmacy at extension [phone] for questions regarding this assessment.    Thank you for the consult,   Jaye Prather       Patient brief summary:  Chapo Rosario is a 64 y.o. male initiated on antimicrobial therapy with IV Vancomycin for treatment of  MRSA respiratory sputum    Drug Allergies:   Review of patient's allergies indicates:  No Known Allergies    Actual Body Weight:   133.8 kg    BMI: 37.87 kg/m2    Renal Function:   Estimated Creatinine Clearance: 123.7 mL/min (based on SCr of 0.9 mg/dL).,     Dialysis Method (if applicable):  N/A    CBC (last 72 hours):  Recent Labs   Lab Result Units 12/08/24  0406 12/09/24  0728 12/10/24  0406   WBC K/uL 6.54 5.76 6.41   Hemoglobin g/dL 13.6* 14.1 14.0   Hematocrit % 38.2* 39.1* 38.0*   Platelets K/uL 75* 96* 111*   Gran % % 61.4 53.0 44.3   Lymph % % 30.6 36.8 43.5   Mono % % 6.9 6.8 7.6   Eosinophil % % 0.6 2.4 3.0   Basophil % % 0.3 0.7 1.1   Differential Method  Automated Automated Automated       Metabolic Panel (last 72 hours):  Recent Labs   Lab Result Units 12/08/24  0406 12/09/24  0728 12/10/24  0406   Sodium mmol/L 143 142 142   Potassium mmol/L 3.8 3.8 3.5   Chloride mmol/L 109 108 110   CO2 mmol/L 26 24 24   Glucose mg/dL 212* 198* 166*   BUN mg/dL 17 16 16   Creatinine mg/dL 1.0 1.0 0.9   Albumin g/dL 2.6* 2.5*  2.5*   Total Bilirubin mg/dL 2.9* 2.5* 2.4*   Alkaline Phosphatase U/L 70 78 88   AST U/L 27 33 36   ALT U/L 24 26 26   Magnesium mg/dL 1.7 1.6 1.5*       Vancomycin Administrations:  vancomycin given in the last 96 hours                     vancomycin 2 g in 0.9% sodium chloride 500 mL IVPB ()  Restarted 12/09/24 2206     2,000 mg New Bag  2057     2,000 mg New Bag  1020      Restarted 12/08/24 2033     2,000 mg New Bag  2016    vancomycin 1750 mg in 0.9% sodium chloride 500 mL IVPB ()  Restarted 12/08/24 0926     1,750 mg New Bag  0837     1,750 mg New Bag 12/07/24 0837    vancomycin 2 g in 0.9% sodium chloride 500 mL IVPB ()  Restarted 12/06/24 1717     2,000 mg New Bag  1434                    Microbiologic Results:  Microbiology Results (last 7 days)       Procedure Component Value Units Date/Time    Blood culture #2 **CANNOT BE ORDERED STAT** [[id number]] Collected: 12/06/24 1033    Order Status: Completed Specimen: Blood from Peripheral, Forearm, Left Updated: 12/09/24 1812     Blood Culture, Routine No Growth to date      No Growth to date      No Growth to date      No Growth to date    Blood culture #1 **CANNOT BE ORDERED STAT** [[id number]] Collected: 12/06/24 1023    Order Status: Completed Specimen: Blood from Peripheral, Antecubital, Right Updated: 12/09/24 1812     Blood Culture, Routine No Growth to date      No Growth to date      No Growth to date      No Growth to date    Culture, Respiratory with Gram Stain [[id number]]  (Abnormal)  (Susceptibility) Collected: 12/06/24 1025    Order Status: Completed Specimen: Sputum from Endotracheal Aspirate Updated: 12/09/24 1116     Respiratory Culture METHICILLIN RESISTANT STAPHYLOCOCCUS AUREUS  Few  Normal respiratory olena also present       Gram Stain (Respiratory) <10 epithelial cells per low power field.     Gram Stain (Respiratory) Rare WBC's     Gram Stain (Respiratory) Rare Gram positive cocci

## 2024-12-10 NOTE — PLAN OF CARE
[facility] - Intensive Care  Discharge Final Note    Primary Care Provider: Flynn Delarosa MD    Expected Discharge Date: 12/10/2024    Final Discharge Note (most recent)       Final Note - 12/10/24 0831          Final Note    Assessment Type Final Discharge Note     Anticipated Discharge Disposition Home or Self Care     Hospital Resources/Appts/Education Provided Appointments scheduled and added to AVS        Post-Acute Status    Discharge Delays None known at this time                     Important Message from Medicare             Contact Info       Flynn Delarosa MD   Specialty: Internal Medicine   Relationship: PCP - General    Titus RAJAN [address] [place] [id number]   Phone: [phone]       Next Steps: Follow up on 12/16/2024    Instructions: at 2:30pm    Antonio Ortiz MD   Specialty: Cardiology   Relationship: Consulting Physician    [facility] [address]   Phone: [phone]       Next Steps: Follow up on 12/19/2024    Instructions: at 210pm        Final note is completed. The patient will discharge home with self-care.

## 2024-12-10 NOTE — NURSING
D/c instructions went over with patient and spouse at bedside including insulin administration and checking patients glucose at home via glucometer. Verbalized understanding.

## 2024-12-10 NOTE — ASSESSMENT & PLAN NOTE
Per patient's family no known follow up for hyperglycemia.   Lab Results   Component Value Date    HGBA1C 8.1 (H) 12/06/2024    HGBA1C 6.4 (H) 11/16/2023      Lab Results   Component Value Date     (H) 12/10/2024     (H) 12/09/2024     (H) 12/08/2024   Adjust basal insulin and monitor   POC glucose QID and SSI low dose    Discharge home with basal insulin.  F/u with primary care provider regarding further treatment of DMII. Send diabetic supplies to pharmacy for patient.

## 2024-12-10 NOTE — ASSESSMENT & PLAN NOTE
Body mass index is 37.87 kg/m². Morbid obesity complicates all aspects of disease management from diagnostic modalities to treatment. Weight loss encouraged and health benefits explained to patient.

## 2024-12-10 NOTE — NURSING
Pt being d/c at this time via wheelchair in stable condition with all belongings with patient. Spouse present at time of d/c .

## 2024-12-11 LAB
BACTERIA BLD CULT: NORMAL
BACTERIA BLD CULT: NORMAL

## 2024-12-13 LAB — LEVETIRACETAM SERPL-MCNC: 6.5 UG/ML (ref 3–60)

## 2025-01-18 ENCOUNTER — HOSPITAL ENCOUNTER (EMERGENCY)
Facility: HOSPITAL | Age: 65
Discharge: HOME | End: 2025-01-18
Attending: EMERGENCY MEDICINE
Payer: COMMERCIAL

## 2025-01-18 VITALS
SYSTOLIC BLOOD PRESSURE: 126 MMHG | HEIGHT: 74 IN | OXYGEN SATURATION: 98 % | DIASTOLIC BLOOD PRESSURE: 74 MMHG | HEART RATE: 73 BPM | WEIGHT: 282.38 LBS | BODY MASS INDEX: 36.24 KG/M2 | TEMPERATURE: 98 F | RESPIRATION RATE: 18 BRPM

## 2025-01-18 DIAGNOSIS — U07.1 COVID-19 VIRUS DETECTED: ICD-10-CM

## 2025-01-18 DIAGNOSIS — J84.9 INTERSTITIAL PNEUMONIA: ICD-10-CM

## 2025-01-18 DIAGNOSIS — U07.1 COVID: Primary | ICD-10-CM

## 2025-01-18 DIAGNOSIS — R05.9 COUGH: ICD-10-CM

## 2025-01-18 LAB
CTP QC/QA: YES
CTP QC/QA: YES
POC MOLECULAR INFLUENZA A AGN: NEGATIVE
POC MOLECULAR INFLUENZA B AGN: NEGATIVE
SARS-COV-2 RDRP RESP QL NAA+PROBE: POSITIVE

## 2025-01-18 PROCEDURE — 87635 SARS-COV-2 COVID-19 AMP PRB: CPT | Performed by: CLINICAL NURSE SPECIALIST

## 2025-01-18 PROCEDURE — 87502 INFLUENZA DNA AMP PROBE: CPT

## 2025-01-18 PROCEDURE — 25000003 PHARM REV CODE 250: Performed by: CLINICAL NURSE SPECIALIST

## 2025-01-18 PROCEDURE — 99284 EMERGENCY DEPT VISIT MOD MDM: CPT | Mod: 25

## 2025-01-18 RX ORDER — ALBUTEROL SULFATE 90 UG/1
2 INHALANT RESPIRATORY (INHALATION) EVERY 6 HOURS PRN
Qty: 18 G | Refills: 0 | Status: SHIPPED | OUTPATIENT
Start: 2025-01-18 | End: 2026-01-18

## 2025-01-18 RX ORDER — METHOCARBAMOL 500 MG/1
1000 TABLET, FILM COATED ORAL 3 TIMES DAILY
Qty: 30 TABLET | Refills: 0 | Status: SHIPPED | OUTPATIENT
Start: 2025-01-18 | End: 2025-01-23

## 2025-01-18 RX ORDER — NIRMATRELVIR AND RITONAVIR 300-100 MG
KIT ORAL
Qty: 30 TABLET | Refills: 0 | Status: SHIPPED | OUTPATIENT
Start: 2025-01-18

## 2025-01-18 RX ORDER — DOXYCYCLINE 100 MG/1
100 CAPSULE ORAL 2 TIMES DAILY
Qty: 20 CAPSULE | Refills: 0 | Status: SHIPPED | OUTPATIENT
Start: 2025-01-18 | End: 2025-01-28

## 2025-01-18 RX ORDER — DOXYCYCLINE HYCLATE 100 MG
100 TABLET ORAL
Status: COMPLETED | OUTPATIENT
Start: 2025-01-18 | End: 2025-01-18

## 2025-01-18 RX ORDER — PROMETHAZINE HYDROCHLORIDE AND DEXTROMETHORPHAN HYDROBROMIDE 6.25; 15 MG/5ML; MG/5ML
10 SYRUP ORAL EVERY 6 HOURS PRN
Qty: 120 ML | Refills: 0 | Status: SHIPPED | OUTPATIENT
Start: 2025-01-18 | End: 2025-01-28

## 2025-01-18 RX ADMIN — DOXYCYCLINE HYCLATE 100 MG: 100 TABLET, COATED ORAL at 07:01

## 2025-01-18 NOTE — Clinical Note
"Chapo Rosario (Robert) was seen and treated in our emergency department on 1/18/2025.  He may return to work on 01/23/2025.       If you have any questions or concerns, please don't hesitate to call.      Sue RUIZ    "

## 2025-01-19 NOTE — ED PROVIDER NOTES
Encounter Date: 1/18/2025       History     Chief Complaint   Patient presents with    Chills     Patient was seen by PCP on Wednesday, diagnosed with URI, patient now has the chills onset today.       64-year-old male presents to the emergency room with chills, cough over the last few days.  Went to see PCP on Wednesday and diagnosed with a URI.  Patient states he has got an injection at that time.  Patient reports worsening symptoms.  History of pneumonia several times.  Room air sat 99% with no respiratory distress noted        Review of patient's allergies indicates:  No Known Allergies  Past Medical History:   Diagnosis Date    LEEANN (acute kidney injury) 11/13/2023    Anticoagulant long-term use     Aortic aneurysm     Atrial fibrillation     COVID-19 vaccine administered     Group D streptococcal infection     Hyperlipidemia     Hypertension     Pneumonia, unspecified organism     Severe sepsis 08/30/2018    Sleep apnea     Thrombocytopenia 11/13/2023    VHD (valvular heart disease)      Past Surgical History:   Procedure Laterality Date    ABLATION N/A 4/1/2024    Procedure: Afib Ablation;  Surgeon: Hung Stacy MD;  [facility];  Service: Cardiology;  Laterality: N/A;  ops # 5    aortic valve replacement      APPENDECTOMY      CARDIAC SURGERY  2013    MECHANICAL AVR    CHOLECYSTECTOMY      COLONOSCOPY N/A 2/14/2024    Procedure: COLONOSCOPY;  Surgeon: Ta Flowers MD;  [facility];  Service: Endoscopy;  Laterality: N/A;    CORONARY ANGIOGRAPHY      ECHOCARDIOGRAM, TRANSESOPHAGEAL      ESOPHAGOGASTRODUODENOSCOPY N/A 2/14/2024    Procedure: EGD (ESOPHAGOGASTRODUODENOSCOPY);  Surgeon: Ta Flowers MD;  [facility];  Service: Endoscopy;  Laterality: N/A;    NECK SURGERY      TRANSESOPHAGEAL ECHOCARDIOGRAPHY N/A 11/17/2023    Procedure: ECHOCARDIOGRAM, TRANSESOPHAGEAL;  Surgeon: Antonio Ortiz MD;  [facility];  Service: Cardiovascular;  Laterality: N/A;     TREATMENT OF CARDIAC ARRHYTHMIA N/A 11/17/2023    Procedure: Cardioversion or Defibrillation;  Surgeon: Antonio Ortiz MD;  Location: [facility];  Service: Cardiovascular;  Laterality: N/A;     Family History   Problem Relation Name Age of Onset    No Known Problems Mother      Heart disease Father      Cancer Brother          colon     Social History     Tobacco Use    Smoking status: Former     Passive exposure: Never   Substance Use Topics    Alcohol use: Never    Drug use: Never     Review of Systems   Constitutional:  Positive for chills. Negative for fever.   HENT:  Negative for sore throat.    Respiratory:  Positive for cough. Negative for shortness of breath.    Cardiovascular:  Negative for chest pain.   Gastrointestinal:  Negative for nausea.   Genitourinary:  Negative for dysuria.   Musculoskeletal:  Negative for back pain.   Skin:  Negative for rash.   Neurological:  Negative for weakness.   Hematological:  Does not bruise/bleed easily.   All other systems reviewed and are negative.      Physical Exam     Initial Vitals [01/18/25 1809]   BP Pulse Resp Temp SpO2   122/67 68 18 98.3 °F (36.8 °C) 99 %      MAP       --         Physical Exam    Nursing note and vitals reviewed.  Constitutional: He appears well-developed and well-nourished.   HENT:   Head: Normocephalic and atraumatic.   Eyes: Pupils are equal, round, and reactive to light.   Neck:   Normal range of motion.  Cardiovascular:  Normal rate and regular rhythm.           Pulmonary/Chest: He has rhonchi.   Abdominal: Abdomen is soft. Bowel sounds are normal.   Musculoskeletal:         General: Normal range of motion.      Cervical back: Normal range of motion.     Neurological: He is alert and oriented to person, place, and time.   Psychiatric: He has a normal mood and affect.         ED Course   Procedures  Labs Reviewed   SARS-COV-2 RDRP GENE - Abnormal       Result Value    POC Rapid COVID Positive (*)      Acceptable Yes       Narrative:     .This test utilizes isothermal nucleic acid amplification technology to detect the SARS-CoV-2 RdRp nucleic acid segment. The analytical sensitivity (limit of detection) is 500 copies/swab.     A POSITIVE result is indicative of the presence of SARS-CoV-2 RNA; clinical correlation with patient history and other diagnostic information is necessary to determine patient infection status.    A NEGATIVE result means that SARS-CoV-2 nucleic acids are not present above the limit of detection. A NEGATIVE result should be treated as presumptive. It does not rule out the possibility of COVID-19 and should not be the sole basis for treatment decisions. If COVID-19 is strongly suspected based on clinical and exposure history, re-testing using an alternate molecular assay should be considered.     Commercial kits are provided by [employer]        POCT INFLUENZA A/B MOLECULAR    POC Molecular Influenza A Ag Negative      POC Molecular Influenza B Ag Negative       Acceptable Yes            Imaging Results              X-Ray Chest AP Portable (In process)                      Medications   doxycycline tablet 100 mg (100 mg Oral Given 1/18/25 1905)     Medical Decision Making  Amount and/or Complexity of Data Reviewed  Labs: ordered.  Radiology: ordered.    Risk  Prescription drug management.                                      Clinical Impression:  Final diagnoses:  [R05.9] Cough  [U07.1] COVID (Primary)  [J84.9] Interstitial pneumonia          ED Disposition Condition    Discharge Stable          ED Prescriptions       Medication Sig Dispense Start Date End Date Auth. Provider    promethazine-dextromethorphan (PROMETHAZINE-DM) 6.25-15 mg/5 mL Syrp Take 10 mLs by mouth every 6 (six) hours as needed. 120 mL 1/18/2025 1/28/2025 Cyndee Coleman NP    methocarbamoL (ROBAXIN) 500 MG Tab Take 2 tablets (1,000 mg total) by mouth 3 (three) times daily. for 5 days 30 tablet 1/18/2025 1/23/2025  Cyndee Coleman NP    doxycycline (VIBRAMYCIN) 100 MG Cap Take 1 capsule (100 mg total) by mouth 2 (two) times daily. for 10 days 20 capsule 1/18/2025 1/28/2025 Cyndee Coleman NP    nirmatrelvir-ritonavir (PAXLOVID) 300 mg (150 mg x 2)-100 mg copackaged tablets (EUA) Take 3 tablets by mouth 2 (two) times daily. Each dose contains 2 nirmatrelvir (pink tablets) and 1 ritonavir (white tablet). Take all 3 tablets together 30 tablet 1/18/2025 -- Cyndee Coleman NP    albuterol (VENTOLIN HFA) 90 mcg/actuation inhaler Inhale 2 puffs into the lungs every 6 (six) hours as needed for Wheezing. Rescue 18 g 1/18/2025 1/18/2026 Cyndee Coleman NP          Follow-up Information       Follow up With Specialties Details Why Contact Info    Flynn Delarosa MD Internal Medicine  As needed [address] [facility] [address]  [facility] [address]  [phone]               Cyndee Coleman NP  01/18/25 2019

## 2025-01-19 NOTE — DISCHARGE INSTRUCTIONS
Alternate Tylenol and ibuprofen as needed for temperature greater than 100.4, body aches, headache

## 2025-03-13 DIAGNOSIS — E11.9 TYPE 2 DIABETES MELLITUS WITHOUT COMPLICATION, WITHOUT LONG-TERM CURRENT USE OF INSULIN: ICD-10-CM

## 2025-03-13 DIAGNOSIS — R73.9 HYPERGLYCEMIA: Primary | ICD-10-CM

## 2025-03-14 RX ORDER — CALCIUM CITRATE/VITAMIN D3 200MG-6.25
TABLET ORAL
Qty: 200 STRIP | Refills: 0 | Status: SHIPPED | OUTPATIENT
Start: 2025-03-14

## 2025-05-20 ENCOUNTER — HOSPITAL ENCOUNTER (OUTPATIENT)
Dept: RADIOLOGY | Facility: HOSPITAL | Age: 65
Discharge: HOME | End: 2025-05-20
Attending: INTERNAL MEDICINE
Payer: COMMERCIAL

## 2025-05-20 DIAGNOSIS — M54.50 LOW BACK PAIN: ICD-10-CM

## 2025-05-20 DIAGNOSIS — M54.50 LOW BACK PAIN: Primary | ICD-10-CM

## 2025-05-20 PROCEDURE — 72110 X-RAY EXAM L-2 SPINE 4/>VWS: CPT | Mod: TC
